# Patient Record
Sex: FEMALE | Race: WHITE | NOT HISPANIC OR LATINO | Employment: UNEMPLOYED | URBAN - METROPOLITAN AREA
[De-identification: names, ages, dates, MRNs, and addresses within clinical notes are randomized per-mention and may not be internally consistent; named-entity substitution may affect disease eponyms.]

---

## 2022-03-17 ENCOUNTER — OFFICE VISIT (OUTPATIENT)
Dept: INTERNAL MEDICINE | Facility: CLINIC | Age: 77
End: 2022-03-17
Payer: MEDICARE

## 2022-03-17 VITALS
WEIGHT: 155.88 LBS | TEMPERATURE: 97 F | SYSTOLIC BLOOD PRESSURE: 116 MMHG | HEIGHT: 64 IN | OXYGEN SATURATION: 96 % | HEART RATE: 100 BPM | DIASTOLIC BLOOD PRESSURE: 70 MMHG | BODY MASS INDEX: 26.61 KG/M2

## 2022-03-17 DIAGNOSIS — F41.9 ANXIETY: Primary | ICD-10-CM

## 2022-03-17 DIAGNOSIS — G47.00 INSOMNIA, UNSPECIFIED TYPE: ICD-10-CM

## 2022-03-17 DIAGNOSIS — Z13.6 SCREENING FOR CARDIOVASCULAR CONDITION: ICD-10-CM

## 2022-03-17 DIAGNOSIS — R60.9 EDEMA, UNSPECIFIED TYPE: ICD-10-CM

## 2022-03-17 DIAGNOSIS — R73.9 HYPERGLYCEMIA: ICD-10-CM

## 2022-03-17 DIAGNOSIS — G25.0 ESSENTIAL TREMOR: ICD-10-CM

## 2022-03-17 PROCEDURE — 3074F SYST BP LT 130 MM HG: CPT | Mod: CPTII,S$GLB,, | Performed by: FAMILY MEDICINE

## 2022-03-17 PROCEDURE — 3078F PR MOST RECENT DIASTOLIC BLOOD PRESSURE < 80 MM HG: ICD-10-PCS | Mod: CPTII,S$GLB,, | Performed by: FAMILY MEDICINE

## 2022-03-17 PROCEDURE — 1126F PR PAIN SEVERITY QUANTIFIED, NO PAIN PRESENT: ICD-10-PCS | Mod: CPTII,S$GLB,, | Performed by: FAMILY MEDICINE

## 2022-03-17 PROCEDURE — 99999 PR PBB SHADOW E&M-NEW PATIENT-LVL IV: ICD-10-PCS | Mod: PBBFAC,,, | Performed by: FAMILY MEDICINE

## 2022-03-17 PROCEDURE — 1101F PT FALLS ASSESS-DOCD LE1/YR: CPT | Mod: CPTII,S$GLB,, | Performed by: FAMILY MEDICINE

## 2022-03-17 PROCEDURE — 99999 PR PBB SHADOW E&M-NEW PATIENT-LVL IV: CPT | Mod: PBBFAC,,, | Performed by: FAMILY MEDICINE

## 2022-03-17 PROCEDURE — 1159F MED LIST DOCD IN RCRD: CPT | Mod: CPTII,S$GLB,, | Performed by: FAMILY MEDICINE

## 2022-03-17 PROCEDURE — 1126F AMNT PAIN NOTED NONE PRSNT: CPT | Mod: CPTII,S$GLB,, | Performed by: FAMILY MEDICINE

## 2022-03-17 PROCEDURE — 99204 OFFICE O/P NEW MOD 45 MIN: CPT | Mod: S$GLB,,, | Performed by: FAMILY MEDICINE

## 2022-03-17 PROCEDURE — 3008F BODY MASS INDEX DOCD: CPT | Mod: CPTII,S$GLB,, | Performed by: FAMILY MEDICINE

## 2022-03-17 PROCEDURE — 1159F PR MEDICATION LIST DOCUMENTED IN MEDICAL RECORD: ICD-10-PCS | Mod: CPTII,S$GLB,, | Performed by: FAMILY MEDICINE

## 2022-03-17 PROCEDURE — 3288F FALL RISK ASSESSMENT DOCD: CPT | Mod: CPTII,S$GLB,, | Performed by: FAMILY MEDICINE

## 2022-03-17 PROCEDURE — 3288F PR FALLS RISK ASSESSMENT DOCUMENTED: ICD-10-PCS | Mod: CPTII,S$GLB,, | Performed by: FAMILY MEDICINE

## 2022-03-17 PROCEDURE — 1101F PR PT FALLS ASSESS DOC 0-1 FALLS W/OUT INJ PAST YR: ICD-10-PCS | Mod: CPTII,S$GLB,, | Performed by: FAMILY MEDICINE

## 2022-03-17 PROCEDURE — 3078F DIAST BP <80 MM HG: CPT | Mod: CPTII,S$GLB,, | Performed by: FAMILY MEDICINE

## 2022-03-17 PROCEDURE — 3008F PR BODY MASS INDEX (BMI) DOCUMENTED: ICD-10-PCS | Mod: CPTII,S$GLB,, | Performed by: FAMILY MEDICINE

## 2022-03-17 PROCEDURE — 99204 PR OFFICE/OUTPT VISIT, NEW, LEVL IV, 45-59 MIN: ICD-10-PCS | Mod: S$GLB,,, | Performed by: FAMILY MEDICINE

## 2022-03-17 PROCEDURE — 3074F PR MOST RECENT SYSTOLIC BLOOD PRESSURE < 130 MM HG: ICD-10-PCS | Mod: CPTII,S$GLB,, | Performed by: FAMILY MEDICINE

## 2022-03-17 RX ORDER — CHOLECALCIFEROL (VITAMIN D3) 25 MCG
TABLET ORAL
COMMUNITY

## 2022-03-17 RX ORDER — BIMATOPROST 3 UG/ML
SOLUTION TOPICAL
Qty: 5 ML | Refills: 11 | Status: SHIPPED | OUTPATIENT
Start: 2022-03-17

## 2022-03-17 RX ORDER — ADHESIVE BANDAGE
30 BANDAGE TOPICAL DAILY
COMMUNITY

## 2022-03-17 RX ORDER — ACETAMINOPHEN, DEXTROMETHORPHAN HYDROBROMIDE, DOXYLAMINE SUCCINATE, PHENYLEPHRINE HYDROCHLORIDE 650; 20; 12.5; 1 MG/30ML; MG/30ML; MG/30ML; MG/30ML
1 SOLUTION ORAL
COMMUNITY

## 2022-03-20 NOTE — PROGRESS NOTES
Subjective:      Patient ID: Jannet Hayes is a 76 y.o. female.    Chief Complaint: Establish Care      Patient here to establish care.   She reports worsening essential tremor in hands, L>R.   Also reports history of clonazepam use - had stopped secondary to concerns about cognitive impairment - anxiety significantly worsened as well as the termor. Was originally taking the clonazepam for paresthesias in both legs. 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   Respiratory: Negative for cough.    Cardiovascular: Positive for leg swelling (ankles, mild).   Gastrointestinal: Negative for abdominal pain.   Neurological: Positive for tremors and numbness. Negative for dizziness, seizures, speech difficulty and weakness.   Psychiatric/Behavioral: The patient is nervous/anxious.      Past Medical History:   Diagnosis Date    Amnesia, global, transient 2019    Chronic constipation     Concussion 2020    History of IBS 2016    Skin cancer     Tremors of nervous system 2020    Vertigo 2020          Past Surgical History:   Procedure Laterality Date    ADENOIDECTOMY      LUMBAR LAMINECTOMY WITH SURGICAL REMOVAL OF LESION OF SPINAL CORD BY POSTERIOR APPROACH  1999     Family History   Problem Relation Age of Onset    Diabetes Mother     Stroke Mother     Lymphoma Mother     Heart disease Father      Social History     Socioeconomic History    Marital status:    Tobacco Use    Smoking status: Never Smoker    Smokeless tobacco: Never Used   Substance and Sexual Activity    Alcohol use: Yes     Comment: occas    Drug use: Never    Sexual activity: Not Currently     Review of patient's allergies indicates:   Allergen Reactions    Oxycodone-acetaminophen Anaphylaxis     HALLUCINATIONS    Codeine Other (See Comments)     hallucinations    Erythromycin Other  "(See Comments)     "odd thinking patterns, black dots in front of eyes"...."mycins"    Penicillins Hives     ITCHING, SWELLING.  Patient states "can take Keflex without any problems".       Objective:       /70   Pulse 100   Temp 97.3 °F (36.3 °C) (Temporal)   Ht 5' 4" (1.626 m)   Wt 70.7 kg (155 lb 13.8 oz)   SpO2 96%   BMI 26.75 kg/m²   Physical Exam  Vitals reviewed.   Constitutional:       General: She is not in acute distress.     Appearance: Normal appearance. She is well-developed. She is not ill-appearing or diaphoretic.   HENT:      Head: Normocephalic and atraumatic.      Right Ear: Hearing, tympanic membrane, ear canal and external ear normal.      Left Ear: Hearing, tympanic membrane, ear canal and external ear normal.      Nose: Nose normal.      Mouth/Throat:      Pharynx: Uvula midline. No oropharyngeal exudate.   Eyes:      Conjunctiva/sclera: Conjunctivae normal.      Pupils: Pupils are equal, round, and reactive to light.   Neck:      Thyroid: No thyromegaly.      Trachea: No tracheal deviation.   Cardiovascular:      Rate and Rhythm: Normal rate and regular rhythm.      Heart sounds: Normal heart sounds. No murmur heard.  Pulmonary:      Effort: Pulmonary effort is normal. No respiratory distress.      Breath sounds: Normal breath sounds.   Abdominal:      General: Bowel sounds are normal.      Palpations: Abdomen is soft.      Tenderness: There is no abdominal tenderness. There is no guarding.      Hernia: No hernia is present.   Musculoskeletal:         General: Normal range of motion.      Cervical back: Normal range of motion and neck supple.   Lymphadenopathy:      Cervical: No cervical adenopathy.   Skin:     General: Skin is warm and dry.      Capillary Refill: Capillary refill takes less than 2 seconds.   Neurological:      General: No focal deficit present.      Mental Status: She is alert and oriented to person, place, and time.   Psychiatric:         Mood and Affect: Mood " normal.         Behavior: Behavior normal.         Thought Content: Thought content normal.         Judgment: Judgment normal.       Assessment:     1. Anxiety    2. Essential tremor    3. Screening for cardiovascular condition    4. Hyperglycemia    5. Insomnia, unspecified type    6. Edema, unspecified type      Plan:   Anxiety  -     CBC Auto Differential; Future; Expected date: 03/17/2022  -     Comprehensive Metabolic Panel; Future; Expected date: 03/17/2022  -     Lipid Panel; Future; Expected date: 03/17/2022  -     Hemoglobin A1C; Future; Expected date: 03/17/2022  -     TSH; Future; Expected date: 03/17/2022    Essential tremor  -     CBC Auto Differential; Future; Expected date: 03/17/2022  -     Comprehensive Metabolic Panel; Future; Expected date: 03/17/2022  -     Lipid Panel; Future; Expected date: 03/17/2022  -     Hemoglobin A1C; Future; Expected date: 03/17/2022  -     TSH; Future; Expected date: 03/17/2022    Screening for cardiovascular condition  -     Lipid Panel; Future; Expected date: 03/17/2022    Hyperglycemia  -     Hemoglobin A1C; Future; Expected date: 03/17/2022    Insomnia, unspecified type    Edema, unspecified type  -     BNP; Future; Expected date: 03/17/2022    Other orders  -     bimatoprost (LATISSE) 0.03 % ophthalmic solution; Place one drop on applicator and apply evenly along the skin of the upper eyelid at base of eyelashes once daily at bedtime; repeat procedure for second eye (use a clean applicator).  Dispense: 5 mL; Refill: 11    will plan to come back for fasting labs then return for wellness visit.  Discussed possible primidone, buspirone use - she wants to look them up online before starting anything  Medication List with Changes/Refills   New Medications    BIMATOPROST (LATISSE) 0.03 % OPHTHALMIC SOLUTION    Place one drop on applicator and apply evenly along the skin of the upper eyelid at base of eyelashes once daily at bedtime; repeat procedure for second eye (use a  clean applicator).   Current Medications    ANTIARTHRITIC COMBINATION NO.2 900 MG TAB    Take 2 tablets by mouth once.    MAGNESIUM HYDROXIDE 400 MG/5 ML (MILK OF MAGNESIA) 400 MG/5 ML SUSP    Take 30 mLs by mouth once daily.    MULTIVIT-IRON-FA-CALCIUM-MINS (THERA-TABS M) 27 MG IRON-400 MCG TAB    Take 1 tablet by mouth once daily at 6am.    NON FORMULARY MEDICATION    1 tablet.    OMEGA-3 FATTY ACIDS-FISH -1,000 MG CAP    Take 1,000 mg by mouth once daily at 6am.    TURMERIC ROOT EXTRACT 1,053 MG TAB    Take 1 tablet by mouth.    VITAMIN D (VITAMIN D3) 1000 UNITS TAB    Take by mouth.

## 2022-03-21 ENCOUNTER — LAB VISIT (OUTPATIENT)
Dept: LAB | Facility: HOSPITAL | Age: 77
End: 2022-03-21
Attending: FAMILY MEDICINE
Payer: MEDICARE

## 2022-03-21 DIAGNOSIS — R60.9 EDEMA, UNSPECIFIED TYPE: ICD-10-CM

## 2022-03-21 DIAGNOSIS — Z13.6 SCREENING FOR CARDIOVASCULAR CONDITION: ICD-10-CM

## 2022-03-21 DIAGNOSIS — F41.9 ANXIETY: ICD-10-CM

## 2022-03-21 DIAGNOSIS — G25.0 ESSENTIAL TREMOR: ICD-10-CM

## 2022-03-21 DIAGNOSIS — R73.9 HYPERGLYCEMIA: ICD-10-CM

## 2022-03-21 LAB
ALBUMIN SERPL BCP-MCNC: 3.9 G/DL (ref 3.5–5.2)
ALP SERPL-CCNC: 34 U/L (ref 55–135)
ALT SERPL W/O P-5'-P-CCNC: 21 U/L (ref 10–44)
ANION GAP SERPL CALC-SCNC: 14 MMOL/L (ref 8–16)
AST SERPL-CCNC: 29 U/L (ref 10–40)
BASOPHILS # BLD AUTO: 0.08 K/UL (ref 0–0.2)
BASOPHILS NFR BLD: 1.1 % (ref 0–1.9)
BILIRUB SERPL-MCNC: 0.4 MG/DL (ref 0.1–1)
BNP SERPL-MCNC: <10 PG/ML (ref 0–99)
BUN SERPL-MCNC: 16 MG/DL (ref 8–23)
CALCIUM SERPL-MCNC: 10.1 MG/DL (ref 8.7–10.5)
CHLORIDE SERPL-SCNC: 106 MMOL/L (ref 95–110)
CHOLEST SERPL-MCNC: 218 MG/DL (ref 120–199)
CHOLEST/HDLC SERPL: 2.4 {RATIO} (ref 2–5)
CO2 SERPL-SCNC: 24 MMOL/L (ref 23–29)
CREAT SERPL-MCNC: 0.9 MG/DL (ref 0.5–1.4)
DIFFERENTIAL METHOD: ABNORMAL
EOSINOPHIL # BLD AUTO: 0.1 K/UL (ref 0–0.5)
EOSINOPHIL NFR BLD: 1.6 % (ref 0–8)
ERYTHROCYTE [DISTWIDTH] IN BLOOD BY AUTOMATED COUNT: 13.7 % (ref 11.5–14.5)
EST. GFR  (AFRICAN AMERICAN): >60 ML/MIN/1.73 M^2
EST. GFR  (NON AFRICAN AMERICAN): >60 ML/MIN/1.73 M^2
ESTIMATED AVG GLUCOSE: 97 MG/DL (ref 68–131)
GLUCOSE SERPL-MCNC: 87 MG/DL (ref 70–110)
HBA1C MFR BLD: 5 % (ref 4–5.6)
HCT VFR BLD AUTO: 44.3 % (ref 37–48.5)
HDLC SERPL-MCNC: 91 MG/DL (ref 40–75)
HDLC SERPL: 41.7 % (ref 20–50)
HGB BLD-MCNC: 14.1 G/DL (ref 12–16)
IMM GRANULOCYTES # BLD AUTO: 0.03 K/UL (ref 0–0.04)
IMM GRANULOCYTES NFR BLD AUTO: 0.4 % (ref 0–0.5)
LDLC SERPL CALC-MCNC: 100.6 MG/DL (ref 63–159)
LYMPHOCYTES # BLD AUTO: 2.5 K/UL (ref 1–4.8)
LYMPHOCYTES NFR BLD: 34 % (ref 18–48)
MCH RBC QN AUTO: 30.6 PG (ref 27–31)
MCHC RBC AUTO-ENTMCNC: 31.8 G/DL (ref 32–36)
MCV RBC AUTO: 96 FL (ref 82–98)
MONOCYTES # BLD AUTO: 0.7 K/UL (ref 0.3–1)
MONOCYTES NFR BLD: 9.3 % (ref 4–15)
NEUTROPHILS # BLD AUTO: 3.9 K/UL (ref 1.8–7.7)
NEUTROPHILS NFR BLD: 53.6 % (ref 38–73)
NONHDLC SERPL-MCNC: 127 MG/DL
NRBC BLD-RTO: 0 /100 WBC
PLATELET # BLD AUTO: 350 K/UL (ref 150–450)
PMV BLD AUTO: 10.8 FL (ref 9.2–12.9)
POTASSIUM SERPL-SCNC: 4.1 MMOL/L (ref 3.5–5.1)
PROT SERPL-MCNC: 7.4 G/DL (ref 6–8.4)
RBC # BLD AUTO: 4.61 M/UL (ref 4–5.4)
SODIUM SERPL-SCNC: 144 MMOL/L (ref 136–145)
TRIGL SERPL-MCNC: 132 MG/DL (ref 30–150)
TSH SERPL DL<=0.005 MIU/L-ACNC: 2.8 UIU/ML (ref 0.4–4)
WBC # BLD AUTO: 7.33 K/UL (ref 3.9–12.7)

## 2022-03-21 PROCEDURE — 83036 HEMOGLOBIN GLYCOSYLATED A1C: CPT | Performed by: FAMILY MEDICINE

## 2022-03-21 PROCEDURE — 85025 COMPLETE CBC W/AUTO DIFF WBC: CPT | Performed by: FAMILY MEDICINE

## 2022-03-21 PROCEDURE — 80053 COMPREHEN METABOLIC PANEL: CPT | Performed by: FAMILY MEDICINE

## 2022-03-21 PROCEDURE — 84443 ASSAY THYROID STIM HORMONE: CPT | Performed by: FAMILY MEDICINE

## 2022-03-21 PROCEDURE — 80061 LIPID PANEL: CPT | Performed by: FAMILY MEDICINE

## 2022-03-21 PROCEDURE — 36415 COLL VENOUS BLD VENIPUNCTURE: CPT | Mod: PO | Performed by: FAMILY MEDICINE

## 2022-03-21 PROCEDURE — 83880 ASSAY OF NATRIURETIC PEPTIDE: CPT | Performed by: FAMILY MEDICINE

## 2022-03-22 ENCOUNTER — TELEPHONE (OUTPATIENT)
Dept: INTERNAL MEDICINE | Facility: CLINIC | Age: 77
End: 2022-03-22
Payer: MEDICARE

## 2022-03-24 ENCOUNTER — TELEPHONE (OUTPATIENT)
Dept: INTERNAL MEDICINE | Facility: CLINIC | Age: 77
End: 2022-03-24
Payer: MEDICARE

## 2022-03-24 ENCOUNTER — OFFICE VISIT (OUTPATIENT)
Dept: URGENT CARE | Facility: CLINIC | Age: 77
End: 2022-03-24
Payer: MEDICARE

## 2022-03-24 DIAGNOSIS — M79.604 PAIN OF RIGHT LOWER EXTREMITY: Primary | ICD-10-CM

## 2022-03-24 PROCEDURE — 3044F PR MOST RECENT HEMOGLOBIN A1C LEVEL <7.0%: ICD-10-PCS | Mod: CPTII,S$GLB,, | Performed by: EMERGENCY MEDICINE

## 2022-03-24 PROCEDURE — 99499 UNLISTED E&M SERVICE: CPT | Mod: S$GLB,,, | Performed by: EMERGENCY MEDICINE

## 2022-03-24 PROCEDURE — 99499 NO LOS: ICD-10-PCS | Mod: S$GLB,,, | Performed by: EMERGENCY MEDICINE

## 2022-03-24 PROCEDURE — 3044F HG A1C LEVEL LT 7.0%: CPT | Mod: CPTII,S$GLB,, | Performed by: EMERGENCY MEDICINE

## 2022-03-24 NOTE — TELEPHONE ENCOUNTER
----- Message from Katharine Hope sent at 3/24/2022  9:58 AM CDT -----  .Type:  Same Day Appointment Request    Caller is requesting a same day appointment.  Caller declined first available appointment listed below.    Name of Caller:TUSHAR MICHAEL [[id number]]  When is the first available appointment? 03/28/2022  Symptoms: possible blood clot  Best Call Back Number: [phone]   Additional Information:

## 2022-03-24 NOTE — PROGRESS NOTES
Patient concerned regarding blood clot and right leg.  Admits took a trip to [place] and bilateral lower extremities were swollen.  Requesting checking for a blood clot and right lower extremity.  Discussed with patient the need for further evaluation at emergency room with ultrasound.  Patient decided to go to the ER instead of being checked out in urgent care.  Patient deferred physical exam in urgent care.

## 2022-03-24 NOTE — TELEPHONE ENCOUNTER
Late entry  called patient at 1020 am tried to get same day appt at [facility] location unsuccessfully she is going to walkin clinic or ed

## 2022-03-27 ENCOUNTER — TELEPHONE (OUTPATIENT)
Dept: URGENT CARE | Facility: CLINIC | Age: 77
End: 2022-03-27
Payer: MEDICARE

## 2022-03-28 ENCOUNTER — OFFICE VISIT (OUTPATIENT)
Dept: INTERNAL MEDICINE | Facility: CLINIC | Age: 77
End: 2022-03-28
Payer: MEDICARE

## 2022-03-28 VITALS
WEIGHT: 155.44 LBS | OXYGEN SATURATION: 98 % | DIASTOLIC BLOOD PRESSURE: 76 MMHG | SYSTOLIC BLOOD PRESSURE: 120 MMHG | HEART RATE: 99 BPM | TEMPERATURE: 98 F | HEIGHT: 64 IN | BODY MASS INDEX: 26.54 KG/M2

## 2022-03-28 DIAGNOSIS — Z00.00 WELLNESS EXAMINATION: Primary | ICD-10-CM

## 2022-03-28 DIAGNOSIS — R60.9 EDEMA, UNSPECIFIED TYPE: ICD-10-CM

## 2022-03-28 DIAGNOSIS — Z12.31 ENCOUNTER FOR SCREENING MAMMOGRAM FOR MALIGNANT NEOPLASM OF BREAST: ICD-10-CM

## 2022-03-28 DIAGNOSIS — Z12.11 COLON CANCER SCREENING: ICD-10-CM

## 2022-03-28 DIAGNOSIS — Z78.0 POSTMENOPAUSAL: ICD-10-CM

## 2022-03-28 DIAGNOSIS — R05.3 CHRONIC COUGH: ICD-10-CM

## 2022-03-28 DIAGNOSIS — M81.0 OSTEOPOROSIS, UNSPECIFIED OSTEOPOROSIS TYPE, UNSPECIFIED PATHOLOGICAL FRACTURE PRESENCE: ICD-10-CM

## 2022-03-28 DIAGNOSIS — R53.83 FATIGUE, UNSPECIFIED TYPE: ICD-10-CM

## 2022-03-28 PROCEDURE — 3008F BODY MASS INDEX DOCD: CPT | Mod: CPTII,S$GLB,, | Performed by: FAMILY MEDICINE

## 2022-03-28 PROCEDURE — 1159F MED LIST DOCD IN RCRD: CPT | Mod: CPTII,S$GLB,, | Performed by: FAMILY MEDICINE

## 2022-03-28 PROCEDURE — 3044F HG A1C LEVEL LT 7.0%: CPT | Mod: CPTII,S$GLB,, | Performed by: FAMILY MEDICINE

## 2022-03-28 PROCEDURE — 3008F PR BODY MASS INDEX (BMI) DOCUMENTED: ICD-10-PCS | Mod: CPTII,S$GLB,, | Performed by: FAMILY MEDICINE

## 2022-03-28 PROCEDURE — 99214 PR OFFICE/OUTPT VISIT, EST, LEVL IV, 30-39 MIN: ICD-10-PCS | Mod: 25,S$GLB,, | Performed by: FAMILY MEDICINE

## 2022-03-28 PROCEDURE — 3288F PR FALLS RISK ASSESSMENT DOCUMENTED: ICD-10-PCS | Mod: CPTII,S$GLB,, | Performed by: FAMILY MEDICINE

## 2022-03-28 PROCEDURE — 3288F FALL RISK ASSESSMENT DOCD: CPT | Mod: CPTII,S$GLB,, | Performed by: FAMILY MEDICINE

## 2022-03-28 PROCEDURE — 1159F PR MEDICATION LIST DOCUMENTED IN MEDICAL RECORD: ICD-10-PCS | Mod: CPTII,S$GLB,, | Performed by: FAMILY MEDICINE

## 2022-03-28 PROCEDURE — 1126F PR PAIN SEVERITY QUANTIFIED, NO PAIN PRESENT: ICD-10-PCS | Mod: CPTII,S$GLB,, | Performed by: FAMILY MEDICINE

## 2022-03-28 PROCEDURE — 1101F PT FALLS ASSESS-DOCD LE1/YR: CPT | Mod: CPTII,S$GLB,, | Performed by: FAMILY MEDICINE

## 2022-03-28 PROCEDURE — 1101F PR PT FALLS ASSESS DOC 0-1 FALLS W/OUT INJ PAST YR: ICD-10-PCS | Mod: CPTII,S$GLB,, | Performed by: FAMILY MEDICINE

## 2022-03-28 PROCEDURE — 3074F SYST BP LT 130 MM HG: CPT | Mod: CPTII,S$GLB,, | Performed by: FAMILY MEDICINE

## 2022-03-28 PROCEDURE — 99999 PR PBB SHADOW E&M-EST. PATIENT-LVL V: ICD-10-PCS | Mod: PBBFAC,,, | Performed by: FAMILY MEDICINE

## 2022-03-28 PROCEDURE — 3074F PR MOST RECENT SYSTOLIC BLOOD PRESSURE < 130 MM HG: ICD-10-PCS | Mod: CPTII,S$GLB,, | Performed by: FAMILY MEDICINE

## 2022-03-28 PROCEDURE — 3078F PR MOST RECENT DIASTOLIC BLOOD PRESSURE < 80 MM HG: ICD-10-PCS | Mod: CPTII,S$GLB,, | Performed by: FAMILY MEDICINE

## 2022-03-28 PROCEDURE — 99214 OFFICE O/P EST MOD 30 MIN: CPT | Mod: 25,S$GLB,, | Performed by: FAMILY MEDICINE

## 2022-03-28 PROCEDURE — 1126F AMNT PAIN NOTED NONE PRSNT: CPT | Mod: CPTII,S$GLB,, | Performed by: FAMILY MEDICINE

## 2022-03-28 PROCEDURE — 99999 PR PBB SHADOW E&M-EST. PATIENT-LVL V: CPT | Mod: PBBFAC,,, | Performed by: FAMILY MEDICINE

## 2022-03-28 PROCEDURE — 3078F DIAST BP <80 MM HG: CPT | Mod: CPTII,S$GLB,, | Performed by: FAMILY MEDICINE

## 2022-03-28 PROCEDURE — 3044F PR MOST RECENT HEMOGLOBIN A1C LEVEL <7.0%: ICD-10-PCS | Mod: CPTII,S$GLB,, | Performed by: FAMILY MEDICINE

## 2022-03-28 RX ORDER — ACETAMINOPHEN 500 MG
500 TABLET ORAL
COMMUNITY

## 2022-03-28 RX ORDER — GABAPENTIN 300 MG/1
300 CAPSULE ORAL NIGHTLY
Qty: 30 CAPSULE | Refills: 6 | Status: SHIPPED | OUTPATIENT
Start: 2022-03-28 | End: 2022-06-13

## 2022-03-30 NOTE — PROGRESS NOTES
Subjective:      Patient ID: Jannet Hayes is a 76 y.o. female.    Chief Complaint: Annual Exam      The patient is here today for routine follow up and annual wellness exam. Labs drawn earlier discussed today with the patient.  Has continued swelling in right lower leg - has had since drive to [place] earlier this month, concerned about DVT.   Reports chronic fatigue as well as some difficulty sleeping. Would like to try gabapentin for tremor and RLS.   Osteoporosis with dexa in 2019 - T score - 2.7. she did not start bisphosphonate for this.   Chronic dry cough which is bothersome -  Has had this for several years, random, cannot pinpoint any triggers.         Review of Systems   Constitutional: Positive for fatigue. Negative for activity change and appetite change.   Respiratory: Positive for cough. Negative for shortness of breath and wheezing.    Cardiovascular: Negative for leg swelling.   Gastrointestinal: Negative for abdominal pain.   Neurological: Positive for tremors.   Psychiatric/Behavioral: Positive for sleep disturbance.     Past Medical History:   Diagnosis Date    Amnesia, global, transient 2019    Chronic constipation     Concussion 2020    History of IBS 2016    Skin cancer     Tremors of nervous system 2020    Vertigo 2020          Past Surgical History:   Procedure Laterality Date    ADENOIDECTOMY      LUMBAR LAMINECTOMY WITH SURGICAL REMOVAL OF LESION OF SPINAL CORD BY POSTERIOR APPROACH  1999     Family History   Problem Relation Age of Onset    Diabetes Mother     Stroke Mother     Lymphoma Mother     Heart disease Father      Social History     Socioeconomic History    Marital status:    Tobacco Use    Smoking status: Never Smoker    Smokeless tobacco: Never Used   Substance and Sexual Activity    Alcohol use: Yes     Comment: occas    Drug use: Never    Sexual activity: Not Currently     Review of patient's allergies indicates:   Allergen Reactions     "Oxycodone-acetaminophen Anaphylaxis     HALLUCINATIONS    Codeine Other (See Comments)     hallucinations    Erythromycin Other (See Comments)     "odd thinking patterns, black dots in front of eyes"...."mycins"    Penicillins Hives     ITCHING, SWELLING.  Patient states "can take Keflex without any problems".       Objective:       /76 (BP Location: Left arm, Patient Position: Sitting, BP Method: Large (Manual))   Pulse 99   Temp 98.2 °F (36.8 °C) (Tympanic)   Ht 5' 4" (1.626 m)   Wt 70.5 kg (155 lb 6.8 oz)   SpO2 98%   BMI 26.68 kg/m²   Physical Exam  Vitals reviewed.   Constitutional:       General: She is not in acute distress.     Appearance: Normal appearance. She is well-developed. She is not ill-appearing or diaphoretic.   HENT:      Head: Normocephalic and atraumatic.      Right Ear: Hearing, tympanic membrane, ear canal and external ear normal.      Left Ear: Hearing, tympanic membrane, ear canal and external ear normal.      Nose: Nose normal.      Mouth/Throat:      Pharynx: Uvula midline. No oropharyngeal exudate.   Eyes:      Conjunctiva/sclera: Conjunctivae normal.      Pupils: Pupils are equal, round, and reactive to light.   Neck:      Thyroid: No thyromegaly.      Trachea: No tracheal deviation.   Cardiovascular:      Rate and Rhythm: Normal rate and regular rhythm.      Heart sounds: Normal heart sounds. No murmur heard.  Pulmonary:      Effort: Pulmonary effort is normal. No respiratory distress.      Breath sounds: Normal breath sounds.   Abdominal:      General: Bowel sounds are normal.      Palpations: Abdomen is soft.      Tenderness: There is no abdominal tenderness. There is no guarding.      Hernia: No hernia is present.   Musculoskeletal:         General: Normal range of motion.      Cervical back: Normal range of motion and neck supple.   Lymphadenopathy:      Cervical: No cervical adenopathy.   Skin:     General: Skin is warm and dry.      Capillary Refill: Capillary " refill takes less than 2 seconds.   Neurological:      General: No focal deficit present.      Mental Status: She is alert and oriented to person, place, and time.      Comments: Tremor     Psychiatric:         Mood and Affect: Mood normal.         Behavior: Behavior normal.         Thought Content: Thought content normal.         Judgment: Judgment normal.       Assessment:     1. Wellness examination    2. Edema, unspecified type    3. Osteoporosis, unspecified osteoporosis type, unspecified pathological fracture presence    4. Encounter for screening mammogram for malignant neoplasm of breast    5. Postmenopausal    6. Colon cancer screening    7. Chronic cough    8. Fatigue, unspecified type      Plan:   Wellness examination    Edema, unspecified type  -     US Lower Extremity Veins Right; Future; Expected date: 03/28/2022    Osteoporosis, unspecified osteoporosis type, unspecified pathological fracture presence  Comments:  dexa 2019 T -2.7    Encounter for screening mammogram for malignant neoplasm of breast  -     Mammo Digital Screening Bilat w/ Rei; Future; Expected date: 03/28/2022    Postmenopausal  -     DXA Bone Density Spine And Hip; Future; Expected date: 03/28/2022    Colon cancer screening  -     Fecal Immunochemical Test (iFOBT); Future; Expected date: 03/28/2022    Chronic cough    Fatigue, unspecified type    Other orders  -     gabapentin (NEURONTIN) 300 MG capsule; Take 1 capsule (300 mg total) by mouth every evening.  Dispense: 30 capsule; Refill: 6    declined vaccines    Medication List with Changes/Refills   New Medications    GABAPENTIN (NEURONTIN) 300 MG CAPSULE    Take 1 capsule (300 mg total) by mouth every evening.   Current Medications    ACETAMINOPHEN (TYLENOL) 500 MG TABLET    Take 500 mg by mouth as needed.    ANTIARTHRITIC COMBINATION NO.2 900 MG TAB    Take 2 tablets by mouth once.    BIMATOPROST (LATISSE) 0.03 % OPHTHALMIC SOLUTION    Place one drop on applicator and apply  evenly along the skin of the upper eyelid at base of eyelashes once daily at bedtime; repeat procedure for second eye (use a clean applicator).    MAGNESIUM HYDROXIDE 400 MG/5 ML (MILK OF MAGNESIA) 400 MG/5 ML SUSP    Take 30 mLs by mouth once daily.    MULTIVIT-IRON-FA-CALCIUM-MINS (THERA-TABS M) 27 MG IRON-400 MCG TAB    Take 1 tablet by mouth once daily at 6am.    NON FORMULARY MEDICATION    1 tablet.    OMEGA-3 FATTY ACIDS-FISH -1,000 MG CAP    Take 1,000 mg by mouth once daily at 6am.    TURMERIC ROOT EXTRACT 1,053 MG TAB    Take 1 tablet by mouth.    VITAMIN D (VITAMIN D3) 1000 UNITS TAB    Take by mouth.

## 2022-04-01 ENCOUNTER — HOSPITAL ENCOUNTER (OUTPATIENT)
Dept: RADIOLOGY | Facility: HOSPITAL | Age: 77
Discharge: HOME | End: 2022-04-01
Attending: FAMILY MEDICINE
Payer: MEDICARE

## 2022-04-01 DIAGNOSIS — R60.9 EDEMA, UNSPECIFIED TYPE: ICD-10-CM

## 2022-04-01 PROCEDURE — 93971 EXTREMITY STUDY: CPT | Mod: TC,RT

## 2022-04-01 PROCEDURE — 93971 US LOWER EXTREMITY VEINS RIGHT: ICD-10-PCS | Mod: 26,RT,, | Performed by: RADIOLOGY

## 2022-04-01 PROCEDURE — 93971 EXTREMITY STUDY: CPT | Mod: 26,RT,, | Performed by: RADIOLOGY

## 2022-04-11 ENCOUNTER — TELEPHONE (OUTPATIENT)
Dept: INTERNAL MEDICINE | Facility: CLINIC | Age: 77
End: 2022-04-11
Payer: MEDICARE

## 2022-04-11 NOTE — TELEPHONE ENCOUNTER
----- Message from González Aburto MD sent at 4/9/2022 11:40 AM CDT -----  Please notify stool test negative, repeat 1 year

## 2022-06-03 ENCOUNTER — OFFICE VISIT (OUTPATIENT)
Dept: GASTROENTEROLOGY | Facility: CLINIC | Age: 77
End: 2022-06-03
Payer: MEDICARE

## 2022-06-03 ENCOUNTER — HOSPITAL ENCOUNTER (OUTPATIENT)
Dept: RADIOLOGY | Facility: HOSPITAL | Age: 77
Discharge: HOME | End: 2022-06-03
Attending: PHYSICIAN ASSISTANT
Payer: MEDICARE

## 2022-06-03 VITALS
SYSTOLIC BLOOD PRESSURE: 100 MMHG | OXYGEN SATURATION: 98 % | WEIGHT: 147.25 LBS | DIASTOLIC BLOOD PRESSURE: 78 MMHG | BODY MASS INDEX: 25.14 KG/M2 | HEART RATE: 80 BPM | HEIGHT: 64 IN

## 2022-06-03 DIAGNOSIS — R14.0 ABDOMINAL BLOATING: Primary | ICD-10-CM

## 2022-06-03 DIAGNOSIS — R68.81 EARLY SATIETY: ICD-10-CM

## 2022-06-03 DIAGNOSIS — R53.83 FATIGUE, UNSPECIFIED TYPE: ICD-10-CM

## 2022-06-03 DIAGNOSIS — K59.00 CONSTIPATION, UNSPECIFIED CONSTIPATION TYPE: ICD-10-CM

## 2022-06-03 DIAGNOSIS — R14.0 ABDOMINAL BLOATING: ICD-10-CM

## 2022-06-03 PROCEDURE — 99999 PR PBB SHADOW E&M-EST. PATIENT-LVL IV: CPT | Mod: PBBFAC,,, | Performed by: PHYSICIAN ASSISTANT

## 2022-06-03 PROCEDURE — 3008F PR BODY MASS INDEX (BMI) DOCUMENTED: ICD-10-PCS | Mod: CPTII,S$GLB,, | Performed by: PHYSICIAN ASSISTANT

## 2022-06-03 PROCEDURE — 99203 OFFICE O/P NEW LOW 30 MIN: CPT | Mod: S$GLB,,, | Performed by: PHYSICIAN ASSISTANT

## 2022-06-03 PROCEDURE — 1101F PR PT FALLS ASSESS DOC 0-1 FALLS W/OUT INJ PAST YR: ICD-10-PCS | Mod: CPTII,S$GLB,, | Performed by: PHYSICIAN ASSISTANT

## 2022-06-03 PROCEDURE — 3074F SYST BP LT 130 MM HG: CPT | Mod: CPTII,S$GLB,, | Performed by: PHYSICIAN ASSISTANT

## 2022-06-03 PROCEDURE — 3044F PR MOST RECENT HEMOGLOBIN A1C LEVEL <7.0%: ICD-10-PCS | Mod: CPTII,S$GLB,, | Performed by: PHYSICIAN ASSISTANT

## 2022-06-03 PROCEDURE — 1159F MED LIST DOCD IN RCRD: CPT | Mod: CPTII,S$GLB,, | Performed by: PHYSICIAN ASSISTANT

## 2022-06-03 PROCEDURE — 3044F HG A1C LEVEL LT 7.0%: CPT | Mod: CPTII,S$GLB,, | Performed by: PHYSICIAN ASSISTANT

## 2022-06-03 PROCEDURE — 1159F PR MEDICATION LIST DOCUMENTED IN MEDICAL RECORD: ICD-10-PCS | Mod: CPTII,S$GLB,, | Performed by: PHYSICIAN ASSISTANT

## 2022-06-03 PROCEDURE — 3078F PR MOST RECENT DIASTOLIC BLOOD PRESSURE < 80 MM HG: ICD-10-PCS | Mod: CPTII,S$GLB,, | Performed by: PHYSICIAN ASSISTANT

## 2022-06-03 PROCEDURE — 99203 PR OFFICE/OUTPT VISIT, NEW, LEVL III, 30-44 MIN: ICD-10-PCS | Mod: S$GLB,,, | Performed by: PHYSICIAN ASSISTANT

## 2022-06-03 PROCEDURE — 1126F PR PAIN SEVERITY QUANTIFIED, NO PAIN PRESENT: ICD-10-PCS | Mod: CPTII,S$GLB,, | Performed by: PHYSICIAN ASSISTANT

## 2022-06-03 PROCEDURE — 3078F DIAST BP <80 MM HG: CPT | Mod: CPTII,S$GLB,, | Performed by: PHYSICIAN ASSISTANT

## 2022-06-03 PROCEDURE — 3288F PR FALLS RISK ASSESSMENT DOCUMENTED: ICD-10-PCS | Mod: CPTII,S$GLB,, | Performed by: PHYSICIAN ASSISTANT

## 2022-06-03 PROCEDURE — 3074F PR MOST RECENT SYSTOLIC BLOOD PRESSURE < 130 MM HG: ICD-10-PCS | Mod: CPTII,S$GLB,, | Performed by: PHYSICIAN ASSISTANT

## 2022-06-03 PROCEDURE — 99999 PR PBB SHADOW E&M-EST. PATIENT-LVL IV: ICD-10-PCS | Mod: PBBFAC,,, | Performed by: PHYSICIAN ASSISTANT

## 2022-06-03 PROCEDURE — 1126F AMNT PAIN NOTED NONE PRSNT: CPT | Mod: CPTII,S$GLB,, | Performed by: PHYSICIAN ASSISTANT

## 2022-06-03 PROCEDURE — 1101F PT FALLS ASSESS-DOCD LE1/YR: CPT | Mod: CPTII,S$GLB,, | Performed by: PHYSICIAN ASSISTANT

## 2022-06-03 PROCEDURE — 3008F BODY MASS INDEX DOCD: CPT | Mod: CPTII,S$GLB,, | Performed by: PHYSICIAN ASSISTANT

## 2022-06-03 PROCEDURE — 74019 XR ABDOMEN FLAT AND ERECT: ICD-10-PCS | Mod: 26,,, | Performed by: RADIOLOGY

## 2022-06-03 PROCEDURE — 74019 RADEX ABDOMEN 2 VIEWS: CPT | Mod: TC

## 2022-06-03 PROCEDURE — 3288F FALL RISK ASSESSMENT DOCD: CPT | Mod: CPTII,S$GLB,, | Performed by: PHYSICIAN ASSISTANT

## 2022-06-03 PROCEDURE — 74019 RADEX ABDOMEN 2 VIEWS: CPT | Mod: 26,,, | Performed by: RADIOLOGY

## 2022-06-03 RX ORDER — ESTRADIOL 0.07 MG/D
FILM, EXTENDED RELEASE TRANSDERMAL
COMMUNITY
Start: 2022-05-31

## 2022-06-03 RX ORDER — PROGESTERONE 100 MG/1
200 CAPSULE ORAL NIGHTLY
COMMUNITY
Start: 2022-05-27

## 2022-06-03 NOTE — PROGRESS NOTES
Clinic Consult:  [facility] Consultation Note    Reason for Consult:  The primary encounter diagnosis was Abdominal bloating. Diagnoses of Constipation, unspecified constipation type, Fatigue, unspecified type, and Early satiety were also pertinent to this visit.    PCP: González Aburto   No address on file    CC: Abdominal Pain      HPI:  This is a 76 y.o. female here for evaluation of the above. Reports many gastro problems over the past 10-12 years. In last few weeks, worsening. Getting very full even with small amounts of food. A lot of gas and bloating. Feels a burning/aching pain around the middle of her abdomen. Stomach can get hard and distended with small amount of food. Fatigued. COVID about 1 month ago. Seems more tired now than when she has COVID. Feels short of breath sometimes. Would like to rule out stomach cancer. Appetite seems maybe a little decreased which is unusual. Bowels have always been a problem. Takes Milk of Magnesia daily. No blood in the stool. 10 yrs ago went to numerous studies and tests done which showed redundant colon and slow emptying of the stomach and pelvic floor dysfunction. Nauseated occasionally. EGD and colonoscopy completed 10-12 yrs ago. Believes she had a colonoscopy about 3 years ago in [place]. Has done cologuard and FIT since then with negative results.    Review of Systems   Constitutional: Positive for fatigue. Negative for activity change, appetite change, chills, diaphoresis, fever and unexpected weight change.   HENT: Negative for trouble swallowing.    Respiratory: Positive for shortness of breath (occasionally with exertion). Negative for cough.    Cardiovascular: Negative for chest pain.   Gastrointestinal: Positive for abdominal pain, constipation and nausea. Negative for abdominal distention, blood in stool, diarrhea and vomiting.   Musculoskeletal: Negative for back pain.   Skin: Negative for color change and pallor.  "  Neurological: Negative for dizziness, weakness and light-headedness.   Psychiatric/Behavioral: Negative for dysphoric mood. The patient is not nervous/anxious.         Medical History:   Past Medical History:   Diagnosis Date    Amnesia, global, transient 2019    Chronic constipation     Concussion 2020    History of IBS 2016    Skin cancer     Tremors of nervous system 2020    Vertigo 2020       Surgical History:   Past Surgical History:   Procedure Laterality Date    ADENOIDECTOMY      LUMBAR LAMINECTOMY WITH SURGICAL REMOVAL OF LESION OF SPINAL CORD BY POSTERIOR APPROACH  1999       Family History:    Family History   Problem Relation Age of Onset    Diabetes Mother     Stroke Mother     Lymphoma Mother     Heart disease Father        Social History:   Social History     Socioeconomic History    Marital status:    Tobacco Use    Smoking status: Never Smoker    Smokeless tobacco: Never Used   Substance and Sexual Activity    Alcohol use: Yes     Comment: occas    Drug use: Never    Sexual activity: Not Currently       Allergies:   Review of patient's allergies indicates:   Allergen Reactions    Oxycodone-acetaminophen Anaphylaxis     HALLUCINATIONS    Codeine Other (See Comments)     hallucinations    Erythromycin Other (See Comments)     "odd thinking patterns, black dots in front of eyes"...."mycins"    Penicillins Hives     ITCHING, SWELLING.  Patient states "can take Keflex without any problems".       Home Medications:   Current Outpatient Medications on File Prior to Visit   Medication Sig Dispense Refill    acetaminophen (TYLENOL) 500 MG tablet Take 500 mg by mouth as needed.      bimatoprost (LATISSE) 0.03 % ophthalmic solution Place one drop on applicator and apply evenly along the skin of the upper eyelid at base of eyelashes once daily at bedtime; repeat procedure for second eye (use a clean applicator). 5 mL 11    estradioL (VIVELLE-DOT) 0.075 mg/24 hr Place onto the " "skin.      magnesium hydroxide 400 mg/5 ml (MILK OF MAGNESIA) 400 mg/5 mL Susp Take 30 mLs by mouth once daily.      multivit-iron-FA-calcium-mins (THERA-TABS M) 27 mg iron-400 mcg Tab Take 1 tablet by mouth once daily at 6am.      omega-3 fatty acids-fish oil 340-1,000 mg Cap Take 1,000 mg by mouth once daily at 6am.      progesterone (PROMETRIUM) 100 MG capsule Take 100 mg by mouth nightly.      turmeric root extract 1,053 mg Tab Take 1 tablet by mouth.      vitamin D (VITAMIN D3) 1000 units Tab Take by mouth.      antiarthritic combination no.2 900 mg Tab Take 2 tablets by mouth once.      gabapentin (NEURONTIN) 300 MG capsule Take 1 capsule (300 mg total) by mouth every evening. (Patient not taking: Reported on 6/3/2022) 30 capsule 6    NON FORMULARY MEDICATION 1 tablet.       No current facility-administered medications on file prior to visit.       Physical Exam:  /78   Pulse 80   Ht 5' 4" (1.626 m)   Wt 66.8 kg (147 lb 4.3 oz)   SpO2 98%   BMI 25.28 kg/m²   Body mass index is 25.28 kg/m².  Physical Exam  Constitutional:       General: She is not in acute distress.     Appearance: Normal appearance. She is normal weight. She is not ill-appearing, toxic-appearing or diaphoretic.   HENT:      Head: Normocephalic and atraumatic.   Eyes:      General: No scleral icterus.     Extraocular Movements: Extraocular movements intact.   Cardiovascular:      Rate and Rhythm: Normal rate and regular rhythm.   Pulmonary:      Effort: Pulmonary effort is normal. No respiratory distress.      Breath sounds: Normal breath sounds.   Abdominal:      General: Bowel sounds are normal. There is no distension.      Palpations: Abdomen is soft. There is no mass.      Tenderness: There is no abdominal tenderness. There is no guarding.   Musculoskeletal:         General: Normal range of motion.      Cervical back: Normal range of motion.   Skin:     General: Skin is warm and dry.      Coloration: Skin is not " jaundiced or pale.   Neurological:      General: No focal deficit present.      Mental Status: She is alert and oriented to person, place, and time.           Labs: Pertinent labs reviewed.  Endoscopy: 2012  CRC Screening: colonoscopy about 3 years ago, recent - FIT  Anticoagulation: none    Assessment:  1. Abdominal bloating    2. Constipation, unspecified constipation type    3. Fatigue, unspecified type    4. Early satiety         Recommendations:  -suspect most symptoms due to constipation. Will get Xray today to evaluate severity. May need mag citrate vs. Bowel prep. Would like for her not to be talking MoM daily. Linzess?  -History of pelvic floor dysfunction - may benefit from PFT.   -If symptoms continue, can discuss imaging vs need for scopes.  -Patient verbalizes understanding and agrees with plan.    Abdominal bloating  -     X-Ray Abdomen Flat And Erect; Future; Expected date: 06/03/2022    Constipation, unspecified constipation type  -     X-Ray Abdomen Flat And Erect; Future; Expected date: 06/03/2022    Fatigue, unspecified type  -     X-Ray Abdomen Flat And Erect; Future; Expected date: 06/03/2022    Early satiety  -     X-Ray Abdomen Flat And Erect; Future; Expected date: 06/03/2022        No follow-ups on file.    Thank you so much for allowing me to participate in the care of Jannet Soriano PA-C

## 2022-06-04 ENCOUNTER — TELEPHONE ENCOUNTER (OUTPATIENT)
Dept: URBAN - METROPOLITAN AREA CLINIC 68 | Facility: CLINIC | Age: 77
End: 2022-06-04

## 2022-06-05 ENCOUNTER — TELEPHONE ENCOUNTER (OUTPATIENT)
Dept: URBAN - METROPOLITAN AREA CLINIC 68 | Facility: CLINIC | Age: 77
End: 2022-06-05

## 2022-06-07 ENCOUNTER — NURSE TRIAGE (OUTPATIENT)
Dept: ADMINISTRATIVE | Facility: CLINIC | Age: 77
End: 2022-06-07
Payer: MEDICARE

## 2022-06-07 ENCOUNTER — TELEPHONE (OUTPATIENT)
Dept: GASTROENTEROLOGY | Facility: CLINIC | Age: 77
End: 2022-06-07
Payer: MEDICARE

## 2022-06-07 DIAGNOSIS — K59.00 CONSTIPATION, UNSPECIFIED CONSTIPATION TYPE: Primary | ICD-10-CM

## 2022-06-07 NOTE — TELEPHONE ENCOUNTER
----- Message from Liam Mata sent at 6/7/2022 11:27 AM CDT -----  Contact: [phone]  Pt is calling in about xray results, please return call, thanks

## 2022-06-07 NOTE — TELEPHONE ENCOUNTER
"Thinks she needs cardiac tests done. Had recent GI appt and was told she had tachycardia. Has been checking HR at home x 1 week and HR was at 115 with minimal exertion, HR will normalize quickly but also having increased fatigue. +new onset BLACK    Also burning to lower abd worse after eating.     [place]vised per [place]. [place] [facility] and [place]. [place] [facility] [place]     Reason for Disposition   MILD difficulty breathing (e.g., minimal/no SOB at rest, SOB with walking, pulse < 100) of new-onset or worse than normal    Additional Information   Negative: SEVERE difficulty breathing (e.g., struggling for each breath, speaks in single words, pulse > 120)   Negative: Breathing stopped and hasn't returned   Negative: Choking on something   Negative: Bluish (or gray) lips or face   Negative: Difficult to awaken or acting confused (e.g., disoriented, slurred speech)   Negative: Passed out (i.e., fainted, collapsed and was not responding)   Negative: Wheezing started suddenly after medicine, an allergic food, or bee sting   Negative: Stridor   Negative: Slow, shallow and weak breathing   Negative: Sounds like a life-threatening emergency to the triager   Negative: MODERATE difficulty breathing (e.g., speaks in phrases, SOB even at rest, pulse 100-120) of new-onset or worse than normal   Negative: Wheezing can be heard across the room   Negative: Drooling or spitting out saliva (because can't swallow)   Negative: Any history of prior "blood clot" in leg or lungs   Negative: Illness requiring prolonged bedrest in past month (e.g., immobilization, long hospital stay)   Negative: Hip or leg fracture (broken bone) in past month (or had cast on leg or ankle in past month)   Negative: Major surgery in the past month   Negative: Long-distance travel in past month (e.g., car, bus, train, plane; with trip lasting 6 or more hours)   Negative: Cancer treatment in past six months (or has cancer now)   Negative: " "Extra heart beats OR irregular heart beating (i.e., "palpitations")   Negative: Fever > 103 F (39.4 C)   Negative: Fever > 101 F (38.3 C) and over 60 years of age   Negative: Fever > 100.0 F (37.8 C) and bedridden (e.g., nursing home patient, stroke, chronic illness, recovering from surgery)   Negative: Fever > 100.0 F (37.8 C) and diabetes mellitus or weak immune system (e.g., HIV positive, cancer chemo, splenectomy, organ transplant, chronic steroids)   Negative: Periods where breathing stops and then resumes normally and bedridden (e.g., nursing home patient, CVA)   Negative: Pregnant or postpartum (from 0 to 6 weeks after delivery)   Negative: Patient sounds very sick or weak to the triager    Protocols used: BREATHING DIFFICULTY-A-[place]      "

## 2022-06-08 DIAGNOSIS — K59.00 CONSTIPATION, UNSPECIFIED CONSTIPATION TYPE: Primary | ICD-10-CM

## 2022-06-08 RX ORDER — LUBIPROSTONE 24 UG/1
24 CAPSULE ORAL
Qty: 30 CAPSULE | Refills: 2 | Status: SHIPPED | OUTPATIENT
Start: 2022-06-08 | End: 2023-11-02

## 2022-06-08 RX ORDER — SODIUM, POTASSIUM,MAG SULFATES 17.5-3.13G
1 SOLUTION, RECONSTITUTED, ORAL ORAL DAILY
Qty: 1 KIT | Refills: 0 | Status: SHIPPED | OUTPATIENT
Start: 2022-06-08 | End: 2022-06-10

## 2022-06-13 ENCOUNTER — HOSPITAL ENCOUNTER (OUTPATIENT)
Dept: RADIOLOGY | Facility: HOSPITAL | Age: 77
Discharge: HOME | End: 2022-06-13
Attending: FAMILY MEDICINE
Payer: MEDICARE

## 2022-06-13 ENCOUNTER — OFFICE VISIT (OUTPATIENT)
Dept: INTERNAL MEDICINE | Facility: CLINIC | Age: 77
End: 2022-06-13
Payer: MEDICARE

## 2022-06-13 VITALS
TEMPERATURE: 97 F | BODY MASS INDEX: 25.03 KG/M2 | SYSTOLIC BLOOD PRESSURE: 98 MMHG | HEART RATE: 102 BPM | OXYGEN SATURATION: 98 % | DIASTOLIC BLOOD PRESSURE: 60 MMHG | WEIGHT: 146.63 LBS | HEIGHT: 64 IN

## 2022-06-13 DIAGNOSIS — R10.33 PAIN, ABDOMINAL, PERIUMBILICAL: ICD-10-CM

## 2022-06-13 DIAGNOSIS — R53.83 FATIGUE, UNSPECIFIED TYPE: ICD-10-CM

## 2022-06-13 DIAGNOSIS — K59.09 CHRONIC CONSTIPATION: ICD-10-CM

## 2022-06-13 DIAGNOSIS — R00.0 TACHYCARDIA: Primary | ICD-10-CM

## 2022-06-13 PROCEDURE — 1101F PT FALLS ASSESS-DOCD LE1/YR: CPT | Mod: CPTII,S$GLB,, | Performed by: FAMILY MEDICINE

## 2022-06-13 PROCEDURE — 1101F PR PT FALLS ASSESS DOC 0-1 FALLS W/OUT INJ PAST YR: ICD-10-PCS | Mod: CPTII,S$GLB,, | Performed by: FAMILY MEDICINE

## 2022-06-13 PROCEDURE — 1126F PR PAIN SEVERITY QUANTIFIED, NO PAIN PRESENT: ICD-10-PCS | Mod: CPTII,S$GLB,, | Performed by: FAMILY MEDICINE

## 2022-06-13 PROCEDURE — 3288F FALL RISK ASSESSMENT DOCD: CPT | Mod: CPTII,S$GLB,, | Performed by: FAMILY MEDICINE

## 2022-06-13 PROCEDURE — 3044F PR MOST RECENT HEMOGLOBIN A1C LEVEL <7.0%: ICD-10-PCS | Mod: CPTII,S$GLB,, | Performed by: FAMILY MEDICINE

## 2022-06-13 PROCEDURE — 3074F SYST BP LT 130 MM HG: CPT | Mod: CPTII,S$GLB,, | Performed by: FAMILY MEDICINE

## 2022-06-13 PROCEDURE — 1159F MED LIST DOCD IN RCRD: CPT | Mod: CPTII,S$GLB,, | Performed by: FAMILY MEDICINE

## 2022-06-13 PROCEDURE — 3008F PR BODY MASS INDEX (BMI) DOCUMENTED: ICD-10-PCS | Mod: CPTII,S$GLB,, | Performed by: FAMILY MEDICINE

## 2022-06-13 PROCEDURE — 3074F PR MOST RECENT SYSTOLIC BLOOD PRESSURE < 130 MM HG: ICD-10-PCS | Mod: CPTII,S$GLB,, | Performed by: FAMILY MEDICINE

## 2022-06-13 PROCEDURE — 3078F DIAST BP <80 MM HG: CPT | Mod: CPTII,S$GLB,, | Performed by: FAMILY MEDICINE

## 2022-06-13 PROCEDURE — 1159F PR MEDICATION LIST DOCUMENTED IN MEDICAL RECORD: ICD-10-PCS | Mod: CPTII,S$GLB,, | Performed by: FAMILY MEDICINE

## 2022-06-13 PROCEDURE — 74018 RADEX ABDOMEN 1 VIEW: CPT | Mod: TC,PO

## 2022-06-13 PROCEDURE — 74018 RADEX ABDOMEN 1 VIEW: CPT | Mod: 26,,, | Performed by: RADIOLOGY

## 2022-06-13 PROCEDURE — 99999 PR PBB SHADOW E&M-EST. PATIENT-LVL V: ICD-10-PCS | Mod: PBBFAC,,, | Performed by: FAMILY MEDICINE

## 2022-06-13 PROCEDURE — 3288F PR FALLS RISK ASSESSMENT DOCUMENTED: ICD-10-PCS | Mod: CPTII,S$GLB,, | Performed by: FAMILY MEDICINE

## 2022-06-13 PROCEDURE — 1126F AMNT PAIN NOTED NONE PRSNT: CPT | Mod: CPTII,S$GLB,, | Performed by: FAMILY MEDICINE

## 2022-06-13 PROCEDURE — 99214 PR OFFICE/OUTPT VISIT, EST, LEVL IV, 30-39 MIN: ICD-10-PCS | Mod: S$GLB,,, | Performed by: FAMILY MEDICINE

## 2022-06-13 PROCEDURE — 99214 OFFICE O/P EST MOD 30 MIN: CPT | Mod: S$GLB,,, | Performed by: FAMILY MEDICINE

## 2022-06-13 PROCEDURE — 3008F BODY MASS INDEX DOCD: CPT | Mod: CPTII,S$GLB,, | Performed by: FAMILY MEDICINE

## 2022-06-13 PROCEDURE — 3078F PR MOST RECENT DIASTOLIC BLOOD PRESSURE < 80 MM HG: ICD-10-PCS | Mod: CPTII,S$GLB,, | Performed by: FAMILY MEDICINE

## 2022-06-13 PROCEDURE — 3044F HG A1C LEVEL LT 7.0%: CPT | Mod: CPTII,S$GLB,, | Performed by: FAMILY MEDICINE

## 2022-06-13 PROCEDURE — 99999 PR PBB SHADOW E&M-EST. PATIENT-LVL V: CPT | Mod: PBBFAC,,, | Performed by: FAMILY MEDICINE

## 2022-06-13 PROCEDURE — 74018 XR ABDOMEN AP 1 VIEW: ICD-10-PCS | Mod: 26,,, | Performed by: RADIOLOGY

## 2022-06-13 RX ORDER — CYSTEINE HCL 500 MG
1000 CAPSULE ORAL DAILY
COMMUNITY

## 2022-06-14 DIAGNOSIS — Z76.89 ESTABLISHING CARE WITH NEW DOCTOR, ENCOUNTER FOR: ICD-10-CM

## 2022-06-14 DIAGNOSIS — R00.0 TACHYCARDIA: Primary | ICD-10-CM

## 2022-06-14 NOTE — PROGRESS NOTES
Subjective:      Patient ID: Jannet Hayes is a 76 y.o. female.    Chief Complaint: Tachycardia, Shortness of Breath, Bloated, and Constipation      Patient reports having had COVID about a month ago,  tested positive, she had similar symptoms, did not bother to get seen.  Since that time having irregular shortness of breath and tachycardia, reports even the effort of getting dressed this morning to come here caused her heart rate to get into the 140s.  Any ambulation causes heart rate to go into the 120s.  Also reports abdominal bloating, periumbilical burning sensation which is worse postprandially.  Does have history of chronic constipation for years, saw a GI PA last week had x-ray which showed constipation.  She was prescribed amitiza but did not realize this had been sent in so had not tried it yet.  Reports has been taking milk of magnesia and other over-the-counter laxatives and having several bowel movements this week.  No change in burning periumbilical pain however.    Review of Systems   Constitutional: Positive for activity change and appetite change.   Respiratory: Positive for shortness of breath.    Cardiovascular: Positive for palpitations. Negative for chest pain.   Gastrointestinal: Positive for abdominal pain.     Past Medical History:   Diagnosis Date    Amnesia, global, transient 2019    Chronic constipation     Concussion 2020    History of IBS 2016    Skin cancer     Tremors of nervous system 2020    Vertigo 2020          Past Surgical History:   Procedure Laterality Date    ADENOIDECTOMY      LUMBAR LAMINECTOMY WITH SURGICAL REMOVAL OF LESION OF SPINAL CORD BY POSTERIOR APPROACH  1999     Family History   Problem Relation Age of Onset    Diabetes Mother     Stroke Mother     Lymphoma Mother     Heart disease Father      Social History     Socioeconomic History    Marital status:    Tobacco Use    Smoking status: Never Smoker    Smokeless tobacco: Never Used  "  Substance and Sexual Activity    Alcohol use: Yes     Comment: occas    Drug use: Never    Sexual activity: Not Currently     Review of patient's allergies indicates:   Allergen Reactions    Oxycodone-acetaminophen Anaphylaxis     HALLUCINATIONS    Codeine Other (See Comments)     hallucinations    Erythromycin Other (See Comments)     "odd thinking patterns, black dots in front of eyes"...."mycins"    Penicillins Hives     ITCHING, SWELLING.  Patient states "can take Keflex without any problems".       Objective:       BP 98/60 (BP Location: Right arm, Patient Position: Sitting, BP Method: Large (Manual))   Pulse 102   Temp 97.4 °F (36.3 °C) (Tympanic)   Ht 5' 4" (1.626 m)   Wt 66.5 kg (146 lb 9.7 oz)   SpO2 98%   BMI 25.16 kg/m²   Physical Exam  Vitals and nursing note reviewed.   Constitutional:       General: She is not in acute distress.     Appearance: Normal appearance. She is well-developed. She is not ill-appearing or diaphoretic.   Cardiovascular:      Rate and Rhythm: Normal rate and regular rhythm.      Heart sounds: Normal heart sounds.   Pulmonary:      Effort: Pulmonary effort is normal. No respiratory distress.      Breath sounds: Normal breath sounds.   Abdominal:      General: Bowel sounds are normal.      Palpations: Abdomen is soft.      Tenderness: There is no abdominal tenderness. There is no guarding or rebound.   Neurological:      Mental Status: She is alert and oriented to person, place, and time.   Psychiatric:         Mood and Affect: Mood normal.         Behavior: Behavior normal.         Thought Content: Thought content normal.         Judgment: Judgment normal.       Assessment:     1. Tachycardia    2. Fatigue, unspecified type    3. Pain, abdominal, periumbilical    4. Chronic constipation      Plan:   Tachycardia  -     Ambulatory referral/consult to Cardiology; Future; Expected date: 06/20/2022    Fatigue, unspecified type    Pain, abdominal, periumbilical  -     H. " pylori antigen, stool; Future; Expected date: 06/13/2022  -     X-Ray Abdomen AP 1 View; Future; Expected date: 06/13/2022    Chronic constipation    Abdominal x-ray today shows some some gas in transverse colon, otherwise normal  She will try the Amitiza  Suspect long COVID symptoms, will send to Cardiology to get checked out  Medication List with Changes/Refills   Current Medications    ACETAMINOPHEN (TYLENOL) 500 MG TABLET    Take 500 mg by mouth as needed.    ANTIARTHRITIC COMBINATION NO.2 900 MG TAB    Take 2 tablets by mouth once.    BIMATOPROST (LATISSE) 0.03 % OPHTHALMIC SOLUTION    Place one drop on applicator and apply evenly along the skin of the upper eyelid at base of eyelashes once daily at bedtime; repeat procedure for second eye (use a clean applicator).    COD LIVER OIL ORAL    Take 1,000 mg by mouth once daily at 6am.    ESTRADIOL (VIVELLE-DOT) 0.075 MG/24 HR    Place onto the skin.    LUBIPROSTONE (AMITIZA) 24 MCG CAP    Take 1 capsule (24 mcg total) by mouth daily with breakfast.    MAGNESIUM HYDROXIDE 400 MG/5 ML (MILK OF MAGNESIA) 400 MG/5 ML SUSP    Take 30 mLs by mouth once daily.    MULTIVIT-IRON-FA-CALCIUM-MINS (THERA-TABS M) 27 MG IRON-400 MCG TAB    Take 1 tablet by mouth once daily at 6am.    NON FORMULARY MEDICATION    1 tablet.    OMEGA-3 FATTY ACIDS-FISH -1,000 MG CAP    Take 1,000 mg by mouth once daily at 6am.    PHOSPHATIDYL SERINE, BULK, MISC    300 mg by Misc.(Non-Drug; Combo Route) route once daily.    PROGESTERONE (PROMETRIUM) 100 MG CAPSULE    Take 100 mg by mouth nightly.    TANNIC/CHESTNUT/PEPPERMINT (ATRANTIL ORAL)    Take 550 mg by mouth once daily at 6am.    TAURINE 1,000 MG CAP    Take 1,000 mg by mouth once daily.    TURMERIC ROOT EXTRACT 1,053 MG TAB    Take 1 tablet by mouth.    VITAMIN D (VITAMIN D3) 1000 UNITS TAB    Take by mouth.   Discontinued Medications    GABAPENTIN (NEURONTIN) 300 MG CAPSULE    Take 1 capsule (300 mg total) by mouth every evening.

## 2022-06-15 ENCOUNTER — OFFICE VISIT (OUTPATIENT)
Dept: CARDIOLOGY | Facility: CLINIC | Age: 77
End: 2022-06-15
Payer: MEDICARE

## 2022-06-15 ENCOUNTER — HOSPITAL ENCOUNTER (OUTPATIENT)
Dept: CARDIOLOGY | Facility: HOSPITAL | Age: 77
Discharge: HOME | End: 2022-06-15
Attending: STUDENT IN AN ORGANIZED HEALTH CARE EDUCATION/TRAINING PROGRAM
Payer: MEDICARE

## 2022-06-15 VITALS
DIASTOLIC BLOOD PRESSURE: 70 MMHG | WEIGHT: 145.31 LBS | OXYGEN SATURATION: 98 % | HEART RATE: 90 BPM | BODY MASS INDEX: 24.81 KG/M2 | HEIGHT: 64 IN | SYSTOLIC BLOOD PRESSURE: 104 MMHG

## 2022-06-15 DIAGNOSIS — R00.0 TACHYCARDIA: ICD-10-CM

## 2022-06-15 DIAGNOSIS — R42 VERTIGO: ICD-10-CM

## 2022-06-15 DIAGNOSIS — K21.9 GASTROESOPHAGEAL REFLUX DISEASE, UNSPECIFIED WHETHER ESOPHAGITIS PRESENT: ICD-10-CM

## 2022-06-15 DIAGNOSIS — Z76.89 ESTABLISHING CARE WITH NEW DOCTOR, ENCOUNTER FOR: ICD-10-CM

## 2022-06-15 DIAGNOSIS — K58.9 IRRITABLE BOWEL SYNDROME, UNSPECIFIED TYPE: ICD-10-CM

## 2022-06-15 DIAGNOSIS — R06.02 SOB (SHORTNESS OF BREATH): ICD-10-CM

## 2022-06-15 DIAGNOSIS — R00.0 TACHYCARDIA: Primary | ICD-10-CM

## 2022-06-15 PROCEDURE — 1126F AMNT PAIN NOTED NONE PRSNT: CPT | Mod: CPTII,S$GLB,, | Performed by: STUDENT IN AN ORGANIZED HEALTH CARE EDUCATION/TRAINING PROGRAM

## 2022-06-15 PROCEDURE — 99999 PR PBB SHADOW E&M-EST. PATIENT-LVL V: CPT | Mod: PBBFAC,,, | Performed by: STUDENT IN AN ORGANIZED HEALTH CARE EDUCATION/TRAINING PROGRAM

## 2022-06-15 PROCEDURE — 3074F PR MOST RECENT SYSTOLIC BLOOD PRESSURE < 130 MM HG: ICD-10-PCS | Mod: CPTII,S$GLB,, | Performed by: STUDENT IN AN ORGANIZED HEALTH CARE EDUCATION/TRAINING PROGRAM

## 2022-06-15 PROCEDURE — 3288F PR FALLS RISK ASSESSMENT DOCUMENTED: ICD-10-PCS | Mod: CPTII,S$GLB,, | Performed by: STUDENT IN AN ORGANIZED HEALTH CARE EDUCATION/TRAINING PROGRAM

## 2022-06-15 PROCEDURE — 99214 PR OFFICE/OUTPT VISIT, EST, LEVL IV, 30-39 MIN: ICD-10-PCS | Mod: S$GLB,,, | Performed by: STUDENT IN AN ORGANIZED HEALTH CARE EDUCATION/TRAINING PROGRAM

## 2022-06-15 PROCEDURE — 1101F PR PT FALLS ASSESS DOC 0-1 FALLS W/OUT INJ PAST YR: ICD-10-PCS | Mod: CPTII,S$GLB,, | Performed by: STUDENT IN AN ORGANIZED HEALTH CARE EDUCATION/TRAINING PROGRAM

## 2022-06-15 PROCEDURE — 1159F MED LIST DOCD IN RCRD: CPT | Mod: CPTII,S$GLB,, | Performed by: STUDENT IN AN ORGANIZED HEALTH CARE EDUCATION/TRAINING PROGRAM

## 2022-06-15 PROCEDURE — 3044F HG A1C LEVEL LT 7.0%: CPT | Mod: CPTII,S$GLB,, | Performed by: STUDENT IN AN ORGANIZED HEALTH CARE EDUCATION/TRAINING PROGRAM

## 2022-06-15 PROCEDURE — 3044F PR MOST RECENT HEMOGLOBIN A1C LEVEL <7.0%: ICD-10-PCS | Mod: CPTII,S$GLB,, | Performed by: STUDENT IN AN ORGANIZED HEALTH CARE EDUCATION/TRAINING PROGRAM

## 2022-06-15 PROCEDURE — 3288F FALL RISK ASSESSMENT DOCD: CPT | Mod: CPTII,S$GLB,, | Performed by: STUDENT IN AN ORGANIZED HEALTH CARE EDUCATION/TRAINING PROGRAM

## 2022-06-15 PROCEDURE — 3078F DIAST BP <80 MM HG: CPT | Mod: CPTII,S$GLB,, | Performed by: STUDENT IN AN ORGANIZED HEALTH CARE EDUCATION/TRAINING PROGRAM

## 2022-06-15 PROCEDURE — 3074F SYST BP LT 130 MM HG: CPT | Mod: CPTII,S$GLB,, | Performed by: STUDENT IN AN ORGANIZED HEALTH CARE EDUCATION/TRAINING PROGRAM

## 2022-06-15 PROCEDURE — 1126F PR PAIN SEVERITY QUANTIFIED, NO PAIN PRESENT: ICD-10-PCS | Mod: CPTII,S$GLB,, | Performed by: STUDENT IN AN ORGANIZED HEALTH CARE EDUCATION/TRAINING PROGRAM

## 2022-06-15 PROCEDURE — 3008F PR BODY MASS INDEX (BMI) DOCUMENTED: ICD-10-PCS | Mod: CPTII,S$GLB,, | Performed by: STUDENT IN AN ORGANIZED HEALTH CARE EDUCATION/TRAINING PROGRAM

## 2022-06-15 PROCEDURE — 1159F PR MEDICATION LIST DOCUMENTED IN MEDICAL RECORD: ICD-10-PCS | Mod: CPTII,S$GLB,, | Performed by: STUDENT IN AN ORGANIZED HEALTH CARE EDUCATION/TRAINING PROGRAM

## 2022-06-15 PROCEDURE — 93010 ELECTROCARDIOGRAM REPORT: CPT | Mod: ,,, | Performed by: INTERNAL MEDICINE

## 2022-06-15 PROCEDURE — 99999 PR PBB SHADOW E&M-EST. PATIENT-LVL V: ICD-10-PCS | Mod: PBBFAC,,, | Performed by: STUDENT IN AN ORGANIZED HEALTH CARE EDUCATION/TRAINING PROGRAM

## 2022-06-15 PROCEDURE — 99214 OFFICE O/P EST MOD 30 MIN: CPT | Mod: S$GLB,,, | Performed by: STUDENT IN AN ORGANIZED HEALTH CARE EDUCATION/TRAINING PROGRAM

## 2022-06-15 PROCEDURE — 93010 EKG 12-LEAD: ICD-10-PCS | Mod: ,,, | Performed by: INTERNAL MEDICINE

## 2022-06-15 PROCEDURE — 93005 ELECTROCARDIOGRAM TRACING: CPT

## 2022-06-15 PROCEDURE — 3008F BODY MASS INDEX DOCD: CPT | Mod: CPTII,S$GLB,, | Performed by: STUDENT IN AN ORGANIZED HEALTH CARE EDUCATION/TRAINING PROGRAM

## 2022-06-15 PROCEDURE — 1101F PT FALLS ASSESS-DOCD LE1/YR: CPT | Mod: CPTII,S$GLB,, | Performed by: STUDENT IN AN ORGANIZED HEALTH CARE EDUCATION/TRAINING PROGRAM

## 2022-06-15 PROCEDURE — 3078F PR MOST RECENT DIASTOLIC BLOOD PRESSURE < 80 MM HG: ICD-10-PCS | Mod: CPTII,S$GLB,, | Performed by: STUDENT IN AN ORGANIZED HEALTH CARE EDUCATION/TRAINING PROGRAM

## 2022-06-15 NOTE — PROGRESS NOTES
"EKG                Section of Cardiology                  Cardiac Clinic Note    Chief Complaint/Reason for consultation:  Tachycardia      HPI:   Jannet Hayes is a 76 y.o. female with h/o vertigo, tremors, irritable bowel syndrome who was referred to cardiology clinic by PCP Dr. Aburto for evaluation.    6/15/2022  Reports being COVID about 1 month ago she believes, but did not get tested  Reports cough and SOB  Has had tachycardia, HR 140s with minimal exertion, taking a shower, putting on make up  Reports some SOB that started about 1 year ago, but worsening over the last few weeks, with minimal exertion. Palpitations with the SOB   Was having her symptoms prior to COVID  Reports bad GERD which can cause chest discomfort   Does not exercise regularly     Denies tobacco abuse. ETOH occ  Denies syncope,   Denies CVA, DM, HTN   Family hx: uncle- possible MI, Gf-  of MI at 80 yo possibly      EKG 6/15/2022 NSR, no acute ST - T wave changes,  ms    ECHO      STRESS TEST    [facility]      ROS: All 10 systems reviewed. Please refer to the HPI for pertinent positives. All other systems negative.     Past Medical History  Past Medical History:   Diagnosis Date    Amnesia, global, transient 2019    Chronic constipation     Concussion 2020    History of IBS 2016    Skin cancer     Tremors of nervous system 2020    Vertigo 2020       Surgical History  Past Surgical History:   Procedure Laterality Date    ADENOIDECTOMY      LUMBAR LAMINECTOMY WITH SURGICAL REMOVAL OF LESION OF SPINAL CORD BY POSTERIOR APPROACH            Allergies:   Review of patient's allergies indicates:   Allergen Reactions    Oxycodone-acetaminophen Anaphylaxis     HALLUCINATIONS    Codeine Other (See Comments)     hallucinations    Erythromycin Other (See Comments)     "odd thinking patterns, black dots in front of eyes"...."mycins"    Penicillins Hives     ITCHING, SWELLING.  Patient states "can take Keflex without any " "problems".       Social History:  Social History     Socioeconomic History    Marital status:    Tobacco Use    Smoking status: Never Smoker    Smokeless tobacco: Never Used   Substance and Sexual Activity    Alcohol use: Yes     Comment: occas    Drug use: Never    Sexual activity: Not Currently       Family History:  family history includes Diabetes in her mother; Heart disease in her father; Lymphoma in her mother; Stroke in her mother.    Home Medications:  Current Outpatient Medications on File Prior to Visit   Medication Sig Dispense Refill    acetaminophen (TYLENOL) 500 MG tablet Take 500 mg by mouth as needed.      antiarthritic combination no.2 900 mg Tab Take 2 tablets by mouth once.      bimatoprost (LATISSE) 0.03 % ophthalmic solution Place one drop on applicator and apply evenly along the skin of the upper eyelid at base of eyelashes once daily at bedtime; repeat procedure for second eye (use a clean applicator). 5 mL 11    COD LIVER OIL ORAL Take 1,000 mg by mouth once daily at 6am.      estradioL (VIVELLE-DOT) 0.075 mg/24 hr Place onto the skin.      magnesium hydroxide 400 mg/5 ml (MILK OF MAGNESIA) 400 mg/5 mL Susp Take 30 mLs by mouth once daily.      multivit-iron-FA-calcium-mins (THERA-TABS M) 27 mg iron-400 mcg Tab Take 1 tablet by mouth once daily at 6am.      omega-3 fatty acids-fish oil 340-1,000 mg Cap Take 1,000 mg by mouth once daily at 6am.      PHOSPHATIDYL SERINE, BULK, MISC 300 mg by Misc.(Non-Drug; Combo Route) route once daily.      progesterone (PROMETRIUM) 100 MG capsule Take 100 mg by mouth nightly.      tannic/chestnut/peppermint (ATRANTIL ORAL) Take 550 mg by mouth once daily at 6am.      taurine 1,000 mg Cap Take 1,000 mg by mouth once daily.      turmeric root extract 1,053 mg Tab Take 1 tablet by mouth.      vitamin D (VITAMIN D3) 1000 units Tab Take by mouth.      lubiprostone (AMITIZA) 24 MCG Cap Take 1 capsule (24 mcg total) by mouth daily with " "breakfast. (Patient not taking: Reported on 6/13/2022) 30 capsule 2    NON FORMULARY MEDICATION 1 tablet.       No current facility-administered medications on file prior to visit.       Physical exam:  /70 (BP Location: Right arm, Patient Position: Sitting, BP Method: Medium (Manual))   Pulse 90   Ht 5' 4" (1.626 m)   Wt 65.9 kg (145 lb 4.5 oz)   SpO2 98%   BMI 24.94 kg/m²         General: Pt is a 76 y.o. year old female who is AAOx3, in NAD, is pleasant, well nourished, looks stated age  HEENT: PERRL, EOMI, Oral mucosa pink & moist  CVS  No abnormal cardiac pulsations noted on inspection. JVP not raised. The apical impulse is normal on palpation, and is located in the left 5th intercostal space in the mid - clavicular line. No palpable thrills or abnormal pulsations noted. RR, S1 - S2 heard, no murmurs, rubs or gallops appreciated.   PUL : CTA B/L. No wheezes/crackles heard   ABD : BS +, soft. No tenderness elicited   LE : No C/C/E. Distal Pulses palpable B/L         LABS:    Chemistry:   Lab Results   Component Value Date     03/21/2022    K 4.1 03/21/2022     03/21/2022    CO2 24 03/21/2022    BUN 16 03/21/2022    CREATININE 0.9 03/21/2022    CALCIUM 10.1 03/21/2022     Cardiac Markers: No results found for: CKTOTAL, CKMB, CKMBINDEX, TROPONINI  Cardiac Markers (Last 3): No results found for: CKTOTAL, CKMB, CKMBINDEX, TROPONINI  CBC:   Lab Results   Component Value Date    WBC 7.33 03/21/2022    HGB 14.1 03/21/2022    HCT 44.3 03/21/2022    MCV 96 03/21/2022     03/21/2022     Lipids:   Lab Results   Component Value Date    CHOL 218 (H) 03/21/2022    TRIG 132 03/21/2022    HDL 91 (H) 03/21/2022     Coagulation: No results found for: PT, INR, APTT        Assessment    1. Tachycardia    2. Vertigo    3. Irritable bowel syndrome, unspecified type    4. Gastroesophageal reflux disease, unspecified whether esophagitis present    5. SOB (shortness of breath)     " "    Plan:    Tachycardia/shortness of breath  Has been worsening, intermittent atypical chest pain  Obtain 7 day cardiac monitor  Obtain echocardiogram  Obtain ETT    Vertigo  Stable  No current symptoms     IBS/GERD  Has been uncontrolled, has bad "gas"  Currently not on medication       This note was prepared using voice recognition system and is likely to have sound alike errors that may have been overlooked even after proofreading.     I have reviewed all pertinent chart information.  Plans and recommendations have been formulated under my direct supervision. All questions answered and patient voiced understanding.   If symptoms persist go to the ED.    RTC in 1 month         Itzel Mcdermott MD  Cardiology          "

## 2022-06-25 ENCOUNTER — TELEPHONE ENCOUNTER (OUTPATIENT)
Age: 77
End: 2022-06-25

## 2022-06-26 ENCOUNTER — TELEPHONE ENCOUNTER (OUTPATIENT)
Age: 77
End: 2022-06-26

## 2022-06-27 ENCOUNTER — HOSPITAL ENCOUNTER (OUTPATIENT)
Dept: CARDIOLOGY | Facility: HOSPITAL | Age: 77
Discharge: HOME | End: 2022-06-27
Attending: STUDENT IN AN ORGANIZED HEALTH CARE EDUCATION/TRAINING PROGRAM
Payer: MEDICARE

## 2022-06-27 VITALS
WEIGHT: 145 LBS | DIASTOLIC BLOOD PRESSURE: 70 MMHG | DIASTOLIC BLOOD PRESSURE: 83 MMHG | HEIGHT: 64 IN | HEIGHT: 64 IN | WEIGHT: 145 LBS | SYSTOLIC BLOOD PRESSURE: 130 MMHG | BODY MASS INDEX: 24.75 KG/M2 | SYSTOLIC BLOOD PRESSURE: 108 MMHG | BODY MASS INDEX: 24.75 KG/M2 | HEART RATE: 100 BPM

## 2022-06-27 DIAGNOSIS — R00.0 TACHYCARDIA: ICD-10-CM

## 2022-06-27 DIAGNOSIS — K21.9 GASTROESOPHAGEAL REFLUX DISEASE, UNSPECIFIED WHETHER ESOPHAGITIS PRESENT: ICD-10-CM

## 2022-06-27 DIAGNOSIS — R42 VERTIGO: ICD-10-CM

## 2022-06-27 DIAGNOSIS — K58.9 IRRITABLE BOWEL SYNDROME, UNSPECIFIED TYPE: ICD-10-CM

## 2022-06-27 DIAGNOSIS — R06.02 SOB (SHORTNESS OF BREATH): ICD-10-CM

## 2022-06-27 LAB
AORTIC ROOT ANNULUS: 2.75 CM
ASCENDING AORTA: 2.68 CM
AV INDEX (PROSTH): 0.93
AV MEAN GRADIENT: 1 MMHG
AV PEAK GRADIENT: 3 MMHG
AV VALVE AREA: 2.78 CM2
AV VELOCITY RATIO: 0.87
BSA FOR ECHO PROCEDURE: 1.72 M2
CV ECHO LV RWT: 0.46 CM
CV STRESS BASE HR: 100 BPM
DIASTOLIC BLOOD PRESSURE: 83 MMHG
DOP CALC AO PEAK VEL: 0.82 M/S
DOP CALC AO VTI: 14.8 CM
DOP CALC LVOT AREA: 3 CM2
DOP CALC LVOT DIAMETER: 1.95 CM
DOP CALC LVOT PEAK VEL: 0.71 M/S
DOP CALC LVOT STROKE VOLUME: 41.19 CM3
DOP CALC RVOT PEAK VEL: 0.77 M/S
DOP CALC RVOT VTI: 13.8 CM
DOP CALCLVOT PEAK VEL VTI: 13.8 CM
E WAVE DECELERATION TIME: 260.25 MSEC
E/A RATIO: 0.58
E/E' RATIO: 6.92 M/S
ECHO LV POSTERIOR WALL: 0.77 CM (ref 0.6–1.1)
EJECTION FRACTION: 60 %
FRACTIONAL SHORTENING: 32 % (ref 28–44)
INTERVENTRICULAR SEPTUM: 0.85 CM (ref 0.6–1.1)
IVC DIAMETER: 0.95 CM
LA MAJOR: 2.77 CM
LA WIDTH: 2.7 CM
LEFT ATRIUM SIZE: 2.56 CM
LEFT INTERNAL DIMENSION IN SYSTOLE: 2.27 CM (ref 2.1–4)
LEFT VENTRICLE DIASTOLIC VOLUME INDEX: 27.01 ML/M2
LEFT VENTRICLE DIASTOLIC VOLUME: 46.18 ML
LEFT VENTRICLE MASS INDEX: 42 G/M2
LEFT VENTRICLE SYSTOLIC VOLUME INDEX: 10.2 ML/M2
LEFT VENTRICLE SYSTOLIC VOLUME: 17.49 ML
LEFT VENTRICULAR INTERNAL DIMENSION IN DIASTOLE: 3.36 CM (ref 3.5–6)
LEFT VENTRICULAR MASS: 71.8 G
LV LATERAL E/E' RATIO: 6.43 M/S
LV SEPTAL E/E' RATIO: 7.5 M/S
LVOT MG: 0.98 MMHG
LVOT MV: 0.45 CM/S
MV PEAK A VEL: 0.78 M/S
MV PEAK E VEL: 0.45 M/S
MV STENOSIS PRESSURE HALF TIME: 75.47 MS
MV VALVE AREA P 1/2 METHOD: 2.91 CM2
OHS CV CPX 1 MINUTE RECOVERY HEART RATE: 151 BPM
OHS CV CPX 85 PERCENT MAX PREDICTED HEART RATE MALE: 120
OHS CV CPX ESTIMATED METS: 7
OHS CV CPX MAX PREDICTED HEART RATE: 141
OHS CV CPX PATIENT IS FEMALE: 1
OHS CV CPX PATIENT IS MALE: 0
OHS CV CPX PEAK DIASTOLIC BLOOD PRESSURE: 77 MMHG
OHS CV CPX PEAK HEAR RATE: 184 BPM
OHS CV CPX PEAK RATE PRESSURE PRODUCT: NORMAL
OHS CV CPX PEAK SYSTOLIC BLOOD PRESSURE: 231 MMHG
OHS CV CPX PERCENT MAX PREDICTED HEART RATE ACHIEVED: 131
OHS CV CPX RATE PRESSURE PRODUCT PRESENTING: NORMAL
PISA TR MAX VEL: 2.22 M/S
PV MEAN GRADIENT: 1.34 MMHG
PV MV: 0.57 M/S
PV PEAK VELOCITY: 0.82 CM/S
RA PRESSURE: 3 MMHG
RIGHT VENTRICULAR END-DIASTOLIC DIMENSION: 2.53 CM
SINUS: 2.86 CM
STJ: 2.46 CM
STRESS ECHO POST EXERCISE DUR MIN: 4 MINUTES
STRESS ECHO POST EXERCISE DUR SEC: 11 SECONDS
SYSTOLIC BLOOD PRESSURE: 130 MMHG
TDI LATERAL: 0.07 M/S
TDI SEPTAL: 0.06 M/S
TDI: 0.07 M/S
TR MAX PG: 20 MMHG
TV REST PULMONARY ARTERY PRESSURE: 23 MMHG

## 2022-06-27 PROCEDURE — 93248 EXT ECG>7D<15D REV&INTERPJ: CPT | Mod: ,,, | Performed by: STUDENT IN AN ORGANIZED HEALTH CARE EDUCATION/TRAINING PROGRAM

## 2022-06-27 PROCEDURE — 93018 EXERCISE STRESS - EKG (CUPID ONLY): ICD-10-PCS | Mod: ,,, | Performed by: INTERNAL MEDICINE

## 2022-06-27 PROCEDURE — 93306 TTE W/DOPPLER COMPLETE: CPT | Mod: 26,,, | Performed by: INTERNAL MEDICINE

## 2022-06-27 PROCEDURE — 93248 CV CARDIAC MONITOR - 3-15 DAY ADULT (CUPID ONLY): ICD-10-PCS | Mod: ,,, | Performed by: STUDENT IN AN ORGANIZED HEALTH CARE EDUCATION/TRAINING PROGRAM

## 2022-06-27 PROCEDURE — 93016 EXERCISE STRESS - EKG (CUPID ONLY): ICD-10-PCS | Mod: ,,, | Performed by: INTERNAL MEDICINE

## 2022-06-27 PROCEDURE — 93306 TTE W/DOPPLER COMPLETE: CPT

## 2022-06-27 PROCEDURE — 93306 ECHO (CUPID ONLY): ICD-10-PCS | Mod: 26,,, | Performed by: INTERNAL MEDICINE

## 2022-06-27 PROCEDURE — 93017 CV STRESS TEST TRACING ONLY: CPT

## 2022-06-27 PROCEDURE — 93016 CV STRESS TEST SUPVJ ONLY: CPT | Mod: ,,, | Performed by: INTERNAL MEDICINE

## 2022-06-27 PROCEDURE — 93246 EXT ECG>7D<15D RECORDING: CPT

## 2022-06-27 PROCEDURE — 93018 CV STRESS TEST I&R ONLY: CPT | Mod: ,,, | Performed by: INTERNAL MEDICINE

## 2022-06-28 ENCOUNTER — TELEPHONE (OUTPATIENT)
Dept: CARDIOLOGY | Facility: CLINIC | Age: 77
End: 2022-06-28
Payer: MEDICARE

## 2022-06-28 NOTE — TELEPHONE ENCOUNTER
Patient was notified of results. All questions were answered. Pt verbalized understanding. Pt will call back with any other questions or concerns.    ----- Message from Eleazar Motta MD sent at 6/28/2022 10:41 AM CDT -----  Stress test shows no heart strain

## 2022-06-28 NOTE — TELEPHONE ENCOUNTER
----- Message from Eleazar Motta MD sent at 6/28/2022 10:41 AM CDT -----  Stress test shows no heart strain

## 2022-06-28 NOTE — TELEPHONE ENCOUNTER
Patient was notified of results. All questions were answered. Pt verbalized understanding. Pt will call back with any other questions or concerns.      ----- Message from Eleazar Motta MD sent at 6/28/2022 10:43 AM CDT -----  Heart function is normal.

## 2022-07-16 LAB
OHS CV HOLTER SINUS AVERAGE HR: 80
OHS CV HOLTER SINUS MAX HR: 151
OHS CV HOLTER SINUS MIN HR: 60

## 2022-07-20 ENCOUNTER — OFFICE VISIT (OUTPATIENT)
Dept: CARDIOLOGY | Facility: CLINIC | Age: 77
End: 2022-07-20
Payer: MEDICARE

## 2022-07-20 VITALS
DIASTOLIC BLOOD PRESSURE: 80 MMHG | HEIGHT: 64 IN | SYSTOLIC BLOOD PRESSURE: 120 MMHG | WEIGHT: 147.5 LBS | BODY MASS INDEX: 25.18 KG/M2 | HEART RATE: 82 BPM | OXYGEN SATURATION: 96 %

## 2022-07-20 DIAGNOSIS — K58.9 IRRITABLE BOWEL SYNDROME, UNSPECIFIED TYPE: ICD-10-CM

## 2022-07-20 DIAGNOSIS — K21.9 GASTROESOPHAGEAL REFLUX DISEASE, UNSPECIFIED WHETHER ESOPHAGITIS PRESENT: ICD-10-CM

## 2022-07-20 DIAGNOSIS — R00.0 TACHYCARDIA: Primary | ICD-10-CM

## 2022-07-20 DIAGNOSIS — R06.02 SOB (SHORTNESS OF BREATH): ICD-10-CM

## 2022-07-20 DIAGNOSIS — R42 VERTIGO: ICD-10-CM

## 2022-07-20 PROCEDURE — 1159F PR MEDICATION LIST DOCUMENTED IN MEDICAL RECORD: ICD-10-PCS | Mod: CPTII,S$GLB,, | Performed by: STUDENT IN AN ORGANIZED HEALTH CARE EDUCATION/TRAINING PROGRAM

## 2022-07-20 PROCEDURE — 3008F BODY MASS INDEX DOCD: CPT | Mod: CPTII,S$GLB,, | Performed by: STUDENT IN AN ORGANIZED HEALTH CARE EDUCATION/TRAINING PROGRAM

## 2022-07-20 PROCEDURE — 3074F SYST BP LT 130 MM HG: CPT | Mod: CPTII,S$GLB,, | Performed by: STUDENT IN AN ORGANIZED HEALTH CARE EDUCATION/TRAINING PROGRAM

## 2022-07-20 PROCEDURE — 1126F PR PAIN SEVERITY QUANTIFIED, NO PAIN PRESENT: ICD-10-PCS | Mod: CPTII,S$GLB,, | Performed by: STUDENT IN AN ORGANIZED HEALTH CARE EDUCATION/TRAINING PROGRAM

## 2022-07-20 PROCEDURE — 99999 PR PBB SHADOW E&M-EST. PATIENT-LVL IV: CPT | Mod: PBBFAC,,, | Performed by: STUDENT IN AN ORGANIZED HEALTH CARE EDUCATION/TRAINING PROGRAM

## 2022-07-20 PROCEDURE — 3008F PR BODY MASS INDEX (BMI) DOCUMENTED: ICD-10-PCS | Mod: CPTII,S$GLB,, | Performed by: STUDENT IN AN ORGANIZED HEALTH CARE EDUCATION/TRAINING PROGRAM

## 2022-07-20 PROCEDURE — 3044F PR MOST RECENT HEMOGLOBIN A1C LEVEL <7.0%: ICD-10-PCS | Mod: CPTII,S$GLB,, | Performed by: STUDENT IN AN ORGANIZED HEALTH CARE EDUCATION/TRAINING PROGRAM

## 2022-07-20 PROCEDURE — 3079F DIAST BP 80-89 MM HG: CPT | Mod: CPTII,S$GLB,, | Performed by: STUDENT IN AN ORGANIZED HEALTH CARE EDUCATION/TRAINING PROGRAM

## 2022-07-20 PROCEDURE — 99214 PR OFFICE/OUTPT VISIT, EST, LEVL IV, 30-39 MIN: ICD-10-PCS | Mod: S$GLB,,, | Performed by: STUDENT IN AN ORGANIZED HEALTH CARE EDUCATION/TRAINING PROGRAM

## 2022-07-20 PROCEDURE — 3074F PR MOST RECENT SYSTOLIC BLOOD PRESSURE < 130 MM HG: ICD-10-PCS | Mod: CPTII,S$GLB,, | Performed by: STUDENT IN AN ORGANIZED HEALTH CARE EDUCATION/TRAINING PROGRAM

## 2022-07-20 PROCEDURE — 3044F HG A1C LEVEL LT 7.0%: CPT | Mod: CPTII,S$GLB,, | Performed by: STUDENT IN AN ORGANIZED HEALTH CARE EDUCATION/TRAINING PROGRAM

## 2022-07-20 PROCEDURE — 3288F PR FALLS RISK ASSESSMENT DOCUMENTED: ICD-10-PCS | Mod: CPTII,S$GLB,, | Performed by: STUDENT IN AN ORGANIZED HEALTH CARE EDUCATION/TRAINING PROGRAM

## 2022-07-20 PROCEDURE — 3079F PR MOST RECENT DIASTOLIC BLOOD PRESSURE 80-89 MM HG: ICD-10-PCS | Mod: CPTII,S$GLB,, | Performed by: STUDENT IN AN ORGANIZED HEALTH CARE EDUCATION/TRAINING PROGRAM

## 2022-07-20 PROCEDURE — 1126F AMNT PAIN NOTED NONE PRSNT: CPT | Mod: CPTII,S$GLB,, | Performed by: STUDENT IN AN ORGANIZED HEALTH CARE EDUCATION/TRAINING PROGRAM

## 2022-07-20 PROCEDURE — 1101F PR PT FALLS ASSESS DOC 0-1 FALLS W/OUT INJ PAST YR: ICD-10-PCS | Mod: CPTII,S$GLB,, | Performed by: STUDENT IN AN ORGANIZED HEALTH CARE EDUCATION/TRAINING PROGRAM

## 2022-07-20 PROCEDURE — 1101F PT FALLS ASSESS-DOCD LE1/YR: CPT | Mod: CPTII,S$GLB,, | Performed by: STUDENT IN AN ORGANIZED HEALTH CARE EDUCATION/TRAINING PROGRAM

## 2022-07-20 PROCEDURE — 1159F MED LIST DOCD IN RCRD: CPT | Mod: CPTII,S$GLB,, | Performed by: STUDENT IN AN ORGANIZED HEALTH CARE EDUCATION/TRAINING PROGRAM

## 2022-07-20 PROCEDURE — 99214 OFFICE O/P EST MOD 30 MIN: CPT | Mod: S$GLB,,, | Performed by: STUDENT IN AN ORGANIZED HEALTH CARE EDUCATION/TRAINING PROGRAM

## 2022-07-20 PROCEDURE — 99999 PR PBB SHADOW E&M-EST. PATIENT-LVL IV: ICD-10-PCS | Mod: PBBFAC,,, | Performed by: STUDENT IN AN ORGANIZED HEALTH CARE EDUCATION/TRAINING PROGRAM

## 2022-07-20 PROCEDURE — 3288F FALL RISK ASSESSMENT DOCD: CPT | Mod: CPTII,S$GLB,, | Performed by: STUDENT IN AN ORGANIZED HEALTH CARE EDUCATION/TRAINING PROGRAM

## 2022-07-20 NOTE — PROGRESS NOTES
Section of Cardiology                  Cardiac Clinic Note    Chief Complaint/Reason for consultation:  Tachycardia      HPI:   Jannet Hayes is a 76 y.o. female with h/o vertigo, tremors, irritable bowel syndrome who was referred to cardiology clinic by PCP Dr. Aburto for evaluation.    6/15/2022  Reports being COVID about 1 month ago she believes, but did not get tested  Reports cough and SOB  Has had tachycardia, HR 140s with minimal exertion, taking a shower, putting on make up  Reports some SOB that started about 1 year ago, but worsening over the last few weeks, with minimal exertion. Palpitations with the SOB   Was having her symptoms prior to COVID  Reports bad GERD which can cause chest discomfort   Does not exercise regularly     Denies tobacco abuse. ETOH occ  Denies syncope,   Denies CVA, DM, HTN   Family hx: uncle- possible MI, Gf-  of MI at 78 yo possibly      22  Had shoulder pain when waking up this morning, not worse with movement   7 day cardiac monitor with ST with symptoms, avg HR normal   Echo with normal EF  ETT negative for ischemia   Had minimal exertion when had palpitations   Reports normal hydration, drinks a lot of tea     Denies chest pain, syncope       EKG 6/15/2022 NSR, no acute ST - T wave changes,  ms    ECHO  22  · The left ventricle is small with concentric remodeling and normal systolic function.  · The estimated ejection fraction is 60%.  · Normal left ventricular diastolic function.  · Normal right ventricular size with normal right ventricular systolic function.  · Mild tricuspid regurgitation.  · Normal central venous pressure (3 mmHg).  · The estimated PA systolic pressure is 23 mmHg.        STRESS TEST    [facility]      ROS: All 10 systems reviewed. Please refer to the HPI for pertinent positives. All other systems negative.     Past Medical History  Past Medical History:   Diagnosis Date    Amnesia, global, transient 2019    Chronic  "constipation     Concussion 2020    History of IBS 2016    Skin cancer     Tremors of nervous system 2020    Vertigo 2020       Surgical History  Past Surgical History:   Procedure Laterality Date    ADENOIDECTOMY      LUMBAR LAMINECTOMY WITH SURGICAL REMOVAL OF LESION OF SPINAL CORD BY POSTERIOR APPROACH  1999          Allergies:   Review of patient's allergies indicates:   Allergen Reactions    Oxycodone-acetaminophen Anaphylaxis     HALLUCINATIONS    Codeine Other (See Comments)     hallucinations    Erythromycin Other (See Comments)     "odd thinking patterns, black dots in front of eyes"...."mycins"    Penicillins Hives     ITCHING, SWELLING.  Patient states "can take Keflex without any problems".       Social History:  Social History     Socioeconomic History    Marital status:    Tobacco Use    Smoking status: Never Smoker    Smokeless tobacco: Never Used   Substance and Sexual Activity    Alcohol use: Yes     Comment: occas    Drug use: Never    Sexual activity: Not Currently       Family History:  family history includes Diabetes in her mother; Heart disease in her father; Lymphoma in her mother; Stroke in her mother.    Home Medications:  Current Outpatient Medications on File Prior to Visit   Medication Sig Dispense Refill    acetaminophen (TYLENOL) 500 MG tablet Take 500 mg by mouth as needed.      antiarthritic combination no.2 900 mg Tab Take 2 tablets by mouth once.      bimatoprost (LATISSE) 0.03 % ophthalmic solution Place one drop on applicator and apply evenly along the skin of the upper eyelid at base of eyelashes once daily at bedtime; repeat procedure for second eye (use a clean applicator). 5 mL 11    COD LIVER OIL ORAL Take 1,000 mg by mouth once daily at 6am.      estradioL (VIVELLE-DOT) 0.075 mg/24 hr Place onto the skin.      magnesium hydroxide 400 mg/5 ml (MILK OF MAGNESIA) 400 mg/5 mL Susp Take 30 mLs by mouth once daily.      multivit-iron-FA-calcium-mins " "(THERA-TABS M) 27 mg iron-400 mcg Tab Take 1 tablet by mouth once daily at 6am.      NON FORMULARY MEDICATION 1 tablet.      omega-3 fatty acids-fish oil 340-1,000 mg Cap Take 1,000 mg by mouth once daily at 6am.      PHOSPHATIDYL SERINE, BULK, MISC 300 mg by Misc.(Non-Drug; Combo Route) route once daily.      progesterone (PROMETRIUM) 100 MG capsule Take 100 mg by mouth nightly.      tannic/chestnut/peppermint (ATRANTIL ORAL) Take 550 mg by mouth once daily at 6am.      taurine 1,000 mg Cap Take 1,000 mg by mouth once daily.      turmeric root extract 1,053 mg Tab Take 1 tablet by mouth.      vitamin D (VITAMIN D3) 1000 units Tab Take by mouth.      lubiprostone (AMITIZA) 24 MCG Cap Take 1 capsule (24 mcg total) by mouth daily with breakfast. (Patient not taking: No sig reported) 30 capsule 2     No current facility-administered medications on file prior to visit.       Physical exam:  /80 (BP Location: Right arm, Patient Position: Sitting, BP Method: Medium (Manual))   Pulse 82   Ht 5' 4" (1.626 m)   Wt 66.9 kg (147 lb 7.8 oz)   SpO2 96%   BMI 25.32 kg/m²         General: Pt is a 76 y.o. year old female who is AAOx3, in NAD, is pleasant, well nourished, looks stated age  HEENT: PERRL, EOMI, Oral mucosa pink & moist  CVS  No abnormal cardiac pulsations noted on inspection. JVP not raised. The apical impulse is normal on palpation, and is located in the left 5th intercostal space in the mid - clavicular line. No palpable thrills or abnormal pulsations noted. RR, S1 - S2 heard, no murmurs, rubs or gallops appreciated.   PUL : CTA B/L. No wheezes/crackles heard   ABD : BS +, soft. No tenderness elicited   LE : No C/C/E. Distal Pulses palpable B/L         LABS:    Chemistry:   Lab Results   Component Value Date     03/21/2022    K 4.1 03/21/2022     03/21/2022    CO2 24 03/21/2022    BUN 16 03/21/2022    CREATININE 0.9 03/21/2022    CALCIUM 10.1 03/21/2022     Cardiac Markers: No results " "found for: CKTOTAL, CKMB, CKMBINDEX, TROPONINI  Cardiac Markers (Last 3): No results found for: CKTOTAL, CKMB, CKMBINDEX, TROPONINI  CBC:   Lab Results   Component Value Date    WBC 7.33 03/21/2022    HGB 14.1 03/21/2022    HCT 44.3 03/21/2022    MCV 96 03/21/2022     03/21/2022     Lipids:   Lab Results   Component Value Date    CHOL 218 (H) 03/21/2022    TRIG 132 03/21/2022    HDL 91 (H) 03/21/2022     Coagulation: No results found for: PT, INR, APTT        Assessment    1. Tachycardia    2. Vertigo    3. Irritable bowel syndrome, unspecified type    4. Gastroesophageal reflux disease, unspecified whether esophagitis present    5. SOB (shortness of breath)         Plan:  Tachycardia/shortness of breath- stable  7 day cardiac monitor with ST with symptoms, avg HR normal   Echo with normal EF  ETT negative for ischemia   Symptom are may be due to deconditioning (has been less active)  increase hydration      Vertigo  Stable  No current symptoms     IBS/GERD  Has been uncontrolled, has bad "gas"  Currently not on medication       This note was prepared using voice recognition system and is likely to have sound alike errors that may have been overlooked even after proofreading.     I have reviewed all pertinent chart information.  Plans and recommendations have been formulated under my direct supervision. All questions answered and patient voiced understanding.   If symptoms persist go to the ED.    RTC in 6 months         Itzel Mcdermott MD  Cardiology            "

## 2022-08-31 ENCOUNTER — PATIENT OUTREACH (OUTPATIENT)
Dept: ADMINISTRATIVE | Facility: HOSPITAL | Age: 77
End: 2022-08-31
Payer: MEDICARE

## 2022-08-31 NOTE — PROGRESS NOTES
Working the Non-Complaint Mammogram Report: Calling the patient to discuss scheduling a mammogram appointment Patient agreed to scheduled 09/30/22 at [facility]

## 2022-09-02 ENCOUNTER — TELEPHONE (OUTPATIENT)
Dept: INTERNAL MEDICINE | Facility: CLINIC | Age: 77
End: 2022-09-02
Payer: MEDICARE

## 2022-09-02 NOTE — TELEPHONE ENCOUNTER
Patient notified insurance will not cover at the clinic, only at the pharmacy.  Blocking provider from ordering.

## 2022-09-02 NOTE — TELEPHONE ENCOUNTER
Her insurance will not pay for the Tdap except at a pharmacy. It is blocking me from ordering it.

## 2022-09-02 NOTE — TELEPHONE ENCOUNTER
----- Message from Lorie Berry MA sent at 9/2/2022  9:47 AM CDT -----  Patient was last seen 6/13/22.  She is requesting a nurse visit for Tdap.  Will she need an appt to be seen with you first?  Please advise.  ----- Message -----  From: Marilyn Cadena MA  Sent: 9/1/2022   5:15 PM CDT  To: Criselda Claudio Staff    Type:  Patient Returning Call    Who Called:pt  Does the patient know what this is regarding?:yes  Would the patient rather a call back or a response via [facility]? Call back  Best Call Back Number: [phone]  Additional Information: pt would like a call back to schedule Tdap vaccine nurse appt

## 2022-09-28 ENCOUNTER — OFFICE VISIT (OUTPATIENT)
Dept: CARDIOLOGY | Facility: CLINIC | Age: 77
End: 2022-09-28
Payer: MEDICARE

## 2022-09-28 VITALS
SYSTOLIC BLOOD PRESSURE: 112 MMHG | HEIGHT: 64 IN | BODY MASS INDEX: 25.25 KG/M2 | OXYGEN SATURATION: 97 % | HEART RATE: 70 BPM | WEIGHT: 147.94 LBS | DIASTOLIC BLOOD PRESSURE: 80 MMHG

## 2022-09-28 DIAGNOSIS — K58.9 IRRITABLE BOWEL SYNDROME, UNSPECIFIED TYPE: ICD-10-CM

## 2022-09-28 DIAGNOSIS — K21.9 GASTROESOPHAGEAL REFLUX DISEASE, UNSPECIFIED WHETHER ESOPHAGITIS PRESENT: ICD-10-CM

## 2022-09-28 DIAGNOSIS — R00.0 TACHYCARDIA: ICD-10-CM

## 2022-09-28 DIAGNOSIS — R42 VERTIGO: ICD-10-CM

## 2022-09-28 DIAGNOSIS — Z01.810 PREOP CARDIOVASCULAR EXAM: Primary | ICD-10-CM

## 2022-09-28 PROCEDURE — 1126F AMNT PAIN NOTED NONE PRSNT: CPT | Mod: CPTII,S$GLB,, | Performed by: STUDENT IN AN ORGANIZED HEALTH CARE EDUCATION/TRAINING PROGRAM

## 2022-09-28 PROCEDURE — 1159F PR MEDICATION LIST DOCUMENTED IN MEDICAL RECORD: ICD-10-PCS | Mod: CPTII,S$GLB,, | Performed by: STUDENT IN AN ORGANIZED HEALTH CARE EDUCATION/TRAINING PROGRAM

## 2022-09-28 PROCEDURE — 99214 OFFICE O/P EST MOD 30 MIN: CPT | Mod: S$GLB,,, | Performed by: STUDENT IN AN ORGANIZED HEALTH CARE EDUCATION/TRAINING PROGRAM

## 2022-09-28 PROCEDURE — 1126F PR PAIN SEVERITY QUANTIFIED, NO PAIN PRESENT: ICD-10-PCS | Mod: CPTII,S$GLB,, | Performed by: STUDENT IN AN ORGANIZED HEALTH CARE EDUCATION/TRAINING PROGRAM

## 2022-09-28 PROCEDURE — 3044F HG A1C LEVEL LT 7.0%: CPT | Mod: CPTII,S$GLB,, | Performed by: STUDENT IN AN ORGANIZED HEALTH CARE EDUCATION/TRAINING PROGRAM

## 2022-09-28 PROCEDURE — 3079F DIAST BP 80-89 MM HG: CPT | Mod: CPTII,S$GLB,, | Performed by: STUDENT IN AN ORGANIZED HEALTH CARE EDUCATION/TRAINING PROGRAM

## 2022-09-28 PROCEDURE — 3079F PR MOST RECENT DIASTOLIC BLOOD PRESSURE 80-89 MM HG: ICD-10-PCS | Mod: CPTII,S$GLB,, | Performed by: STUDENT IN AN ORGANIZED HEALTH CARE EDUCATION/TRAINING PROGRAM

## 2022-09-28 PROCEDURE — 99214 PR OFFICE/OUTPT VISIT, EST, LEVL IV, 30-39 MIN: ICD-10-PCS | Mod: S$GLB,,, | Performed by: STUDENT IN AN ORGANIZED HEALTH CARE EDUCATION/TRAINING PROGRAM

## 2022-09-28 PROCEDURE — 99999 PR PBB SHADOW E&M-EST. PATIENT-LVL IV: CPT | Mod: PBBFAC,,, | Performed by: STUDENT IN AN ORGANIZED HEALTH CARE EDUCATION/TRAINING PROGRAM

## 2022-09-28 PROCEDURE — 3008F PR BODY MASS INDEX (BMI) DOCUMENTED: ICD-10-PCS | Mod: CPTII,S$GLB,, | Performed by: STUDENT IN AN ORGANIZED HEALTH CARE EDUCATION/TRAINING PROGRAM

## 2022-09-28 PROCEDURE — 99999 PR PBB SHADOW E&M-EST. PATIENT-LVL IV: ICD-10-PCS | Mod: PBBFAC,,, | Performed by: STUDENT IN AN ORGANIZED HEALTH CARE EDUCATION/TRAINING PROGRAM

## 2022-09-28 PROCEDURE — 3008F BODY MASS INDEX DOCD: CPT | Mod: CPTII,S$GLB,, | Performed by: STUDENT IN AN ORGANIZED HEALTH CARE EDUCATION/TRAINING PROGRAM

## 2022-09-28 PROCEDURE — 3074F PR MOST RECENT SYSTOLIC BLOOD PRESSURE < 130 MM HG: ICD-10-PCS | Mod: CPTII,S$GLB,, | Performed by: STUDENT IN AN ORGANIZED HEALTH CARE EDUCATION/TRAINING PROGRAM

## 2022-09-28 PROCEDURE — 1159F MED LIST DOCD IN RCRD: CPT | Mod: CPTII,S$GLB,, | Performed by: STUDENT IN AN ORGANIZED HEALTH CARE EDUCATION/TRAINING PROGRAM

## 2022-09-28 PROCEDURE — 3044F PR MOST RECENT HEMOGLOBIN A1C LEVEL <7.0%: ICD-10-PCS | Mod: CPTII,S$GLB,, | Performed by: STUDENT IN AN ORGANIZED HEALTH CARE EDUCATION/TRAINING PROGRAM

## 2022-09-28 PROCEDURE — 3074F SYST BP LT 130 MM HG: CPT | Mod: CPTII,S$GLB,, | Performed by: STUDENT IN AN ORGANIZED HEALTH CARE EDUCATION/TRAINING PROGRAM

## 2022-09-28 NOTE — PROGRESS NOTES
Section of Cardiology                  Cardiac Clinic Note    Chief Complaint/Reason for consultation:  Tachycardia      HPI:   Jannet Hayes is a 76 y.o. female with h/o vertigo, tremors, irritable bowel syndrome who was referred to cardiology clinic by PCP Dr. Aburto for evaluation.    6/15/2022  Reports being COVID about 1 month ago she believes, but did not get tested  Reports cough and SOB  Has had tachycardia, HR 140s with minimal exertion, taking a shower, putting on make up  Reports some SOB that started about 1 year ago, but worsening over the last few weeks, with minimal exertion. Palpitations with the SOB   Was having her symptoms prior to COVID  Reports bad GERD which can cause chest discomfort   Does not exercise regularly     Denies tobacco abuse. ETOH occ  Denies syncope,   Denies CVA, DM, HTN   Family hx: uncle- possible MI, Gf-  of MI at 80 yo possibly      22  Had shoulder pain when waking up this morning, not worse with movement   7 day cardiac monitor with ST with symptoms, avg HR normal   Echo with normal EF  ETT negative for ischemia   Had minimal exertion when had palpitations   Reports normal hydration, drinks a lot of tea     Denies chest pain, syncope       22  Has some palpitations at times, HR has not been as high  Has been active   Preop for D&C procedure with Obgyn  Gets dizzy at times, with lying down     Denies chest pain, syncope       EKG 6/15/2022 NSR, no acute ST - T wave changes,  ms    ECHO  22  The left ventricle is small with concentric remodeling and normal systolic function.  The estimated ejection fraction is 60%.  Normal left ventricular diastolic function.  Normal right ventricular size with normal right ventricular systolic function.  Mild tricuspid regurgitation.  Normal central venous pressure (3 mmHg).  The estimated PA systolic pressure is 23 mmHg.        STRESS TEST    [facility]      ROS: All 10 systems reviewed. Please refer  "to the [facility] for pertinent positives. All other systems negative.     Past Medical History  Past Medical History:   Diagnosis Date    Amnesia, global, transient 2019    Chronic constipation     Concussion 2020    History of IBS 2016    Skin cancer     Tremors of nervous system 2020    Vertigo 2020       Surgical History  Past Surgical History:   Procedure Laterality Date    ADENOIDECTOMY      LUMBAR LAMINECTOMY WITH SURGICAL REMOVAL OF LESION OF SPINAL CORD BY POSTERIOR APPROACH  1999          Allergies:   Review of patient's allergies indicates:   Allergen Reactions    Oxycodone-acetaminophen Anaphylaxis     HALLUCINATIONS    Codeine Other (See Comments)     hallucinations    Erythromycin Other (See Comments)     "odd thinking patterns, black dots in front of eyes"...."mycins"    Penicillins Hives     ITCHING, SWELLING.  Patient states "can take Keflex without any problems".       Social History:  Social History     Socioeconomic History    Marital status:    Tobacco Use    Smoking status: Never    Smokeless tobacco: Never   Substance and Sexual Activity    Alcohol use: Yes     Comment: occas    Drug use: Never    Sexual activity: Not Currently       Family History:  family history includes Diabetes in her mother; Heart disease in her father; Lymphoma in her mother; Stroke in her mother.    Home Medications:  Current Outpatient Medications on File Prior to Visit   Medication Sig Dispense Refill    acetaminophen (TYLENOL) 500 MG tablet Take 500 mg by mouth as needed.      antiarthritic combination no.2 900 mg Tab Take 2 tablets by mouth once.      bimatoprost (LATISSE) 0.03 % ophthalmic solution Place one drop on applicator and apply evenly along the skin of the upper eyelid at base of eyelashes once daily at bedtime; repeat procedure for second eye (use a clean applicator). 5 mL 11    COD LIVER OIL ORAL Take 1,000 mg by mouth once daily at 6am.      estradioL (VIVELLE-DOT) 0.075 mg/24 hr Place onto the skin.   " "   magnesium hydroxide 400 mg/5 ml (MILK OF MAGNESIA) 400 mg/5 mL Susp Take 30 mLs by mouth once daily.      multivit-iron-FA-calcium-mins (THERA-TABS M) 27 mg iron-400 mcg Tab Take 1 tablet by mouth once daily at 6am.      NON FORMULARY MEDICATION 1 tablet.      omega-3 fatty acids-fish oil 340-1,000 mg Cap Take 1,000 mg by mouth once daily at 6am.      PHOSPHATIDYL SERINE, BULK, MISC 300 mg by Misc.(Non-Drug; Combo Route) route once daily.      progesterone (PROMETRIUM) 100 MG capsule Take 100 mg by mouth nightly.      tannic/chestnut/peppermint (ATRANTIL ORAL) Take 550 mg by mouth once daily at 6am.      taurine 1,000 mg Cap Take 1,000 mg by mouth once daily.      turmeric root extract 1,053 mg Tab Take 1 tablet by mouth.      vitamin D (VITAMIN D3) 1000 units Tab Take by mouth.      lubiprostone (AMITIZA) 24 MCG Cap Take 1 capsule (24 mcg total) by mouth daily with breakfast. (Patient not taking: No sig reported) 30 capsule 2     No current facility-administered medications on file prior to visit.       Physical exam:  /80 (BP Location: Right arm, Patient Position: Sitting, BP Method: Medium (Manual))   Pulse 70   Ht 5' 4" (1.626 m)   Wt 67.1 kg (147 lb 14.9 oz)   SpO2 97%   BMI 25.39 kg/m²         General: Pt is a 76 y.o. year old female who is AAOx3, in NAD, is pleasant, well nourished, looks stated age  HEENT: PERRL, EOMI, Oral mucosa pink & moist  CVS  No abnormal cardiac pulsations noted on inspection. JVP not raised. The apical impulse is normal on palpation, and is located in the left 5th intercostal space in the mid - clavicular line. No palpable thrills or abnormal pulsations noted. RR, S1 - S2 heard, no murmurs, rubs or gallops appreciated.   PUL : CTA B/L. No wheezes/crackles heard   ABD : BS +, soft. No tenderness elicited   LE : No C/C/E. Distal Pulses palpable B/L         LABS:    Chemistry:   Lab Results   Component Value Date     03/21/2022    K 4.1 03/21/2022     " "03/21/2022    CO2 24 03/21/2022    BUN 16 03/21/2022    CREATININE 0.9 03/21/2022    CALCIUM 10.1 03/21/2022     Cardiac Markers: No results found for: CKTOTAL, CKMB, CKMBINDEX, TROPONINI  Cardiac Markers (Last 3): No results found for: CKTOTAL, CKMB, CKMBINDEX, TROPONINI  CBC:   Lab Results   Component Value Date    WBC 7.33 03/21/2022    HGB 14.1 03/21/2022    HCT 44.3 03/21/2022    MCV 96 03/21/2022     03/21/2022     Lipids:   Lab Results   Component Value Date    CHOL 218 (H) 03/21/2022    TRIG 132 03/21/2022    HDL 91 (H) 03/21/2022     Coagulation: No results found for: PT, INR, APTT        Assessment    1. Preop cardiovascular exam    2. Tachycardia    3. Vertigo    4. Irritable bowel syndrome, unspecified type    5. Gastroesophageal reflux disease, unspecified whether esophagitis present           Plan:      Preop risk stratification  >4METS  Stable cardiac symptoms  Low CV risk for D&C procedure    Tachycardia/shortness of breath- stable  7 day cardiac monitor with ST with symptoms, avg HR normal   Echo with normal EF  ETT negative for ischemia   Symptoms have improved with increased activity (more active with her dogs)  increase hydration    Vertigo  Stable  No current symptoms     IBS/GERD  Has been uncontrolled, has bad "gas"  Currently not on medication       This note was prepared using voice recognition system and is likely to have sound alike errors that may have been overlooked even after proofreading.     I have reviewed all pertinent chart information.  Plans and recommendations have been formulated under my direct supervision. All questions answered and patient voiced understanding.   If symptoms persist go to the ED.    RTC in 6 months         Itzel Mcdermott MD  Cardiology              "

## 2022-09-30 ENCOUNTER — HOSPITAL ENCOUNTER (OUTPATIENT)
Dept: RADIOLOGY | Facility: HOSPITAL | Age: 77
Discharge: HOME | End: 2022-09-30
Attending: FAMILY MEDICINE
Payer: MEDICARE

## 2022-09-30 DIAGNOSIS — Z12.31 ENCOUNTER FOR SCREENING MAMMOGRAM FOR MALIGNANT NEOPLASM OF BREAST: ICD-10-CM

## 2022-09-30 PROCEDURE — 77063 BREAST TOMOSYNTHESIS BI: CPT | Mod: 26,,, | Performed by: RADIOLOGY

## 2022-09-30 PROCEDURE — 77067 SCR MAMMO BI INCL CAD: CPT | Mod: 26,,, | Performed by: RADIOLOGY

## 2022-09-30 PROCEDURE — 77063 MAMMO DIGITAL SCREENING BILAT WITH TOMO: ICD-10-PCS | Mod: 26,,, | Performed by: RADIOLOGY

## 2022-09-30 PROCEDURE — 77067 SCR MAMMO BI INCL CAD: CPT | Mod: TC

## 2022-09-30 PROCEDURE — 77067 MAMMO DIGITAL SCREENING BILAT WITH TOMO: ICD-10-PCS | Mod: 26,,, | Performed by: RADIOLOGY

## 2022-10-05 ENCOUNTER — HOSPITAL ENCOUNTER (EMERGENCY)
Facility: HOSPITAL | Age: 77
Discharge: HOME | End: 2022-10-05
Attending: EMERGENCY MEDICINE
Payer: MEDICARE

## 2022-10-05 VITALS
HEIGHT: 64 IN | HEART RATE: 90 BPM | SYSTOLIC BLOOD PRESSURE: 184 MMHG | TEMPERATURE: 99 F | OXYGEN SATURATION: 96 % | RESPIRATION RATE: 20 BRPM | WEIGHT: 146.38 LBS | DIASTOLIC BLOOD PRESSURE: 80 MMHG | BODY MASS INDEX: 24.99 KG/M2

## 2022-10-05 DIAGNOSIS — R82.71 BACTERIURIA: ICD-10-CM

## 2022-10-05 DIAGNOSIS — G44.209 TENSION-TYPE HEADACHE, NOT INTRACTABLE, UNSPECIFIED CHRONICITY PATTERN: Primary | ICD-10-CM

## 2022-10-05 LAB
ALBUMIN SERPL BCP-MCNC: 4.2 G/DL (ref 3.5–5.2)
ALP SERPL-CCNC: 51 U/L (ref 55–135)
ALT SERPL W/O P-5'-P-CCNC: 19 U/L (ref 10–44)
ANION GAP SERPL CALC-SCNC: 12 MMOL/L (ref 8–16)
AST SERPL-CCNC: 21 U/L (ref 10–40)
BACTERIA #/AREA URNS HPF: ABNORMAL /HPF
BASOPHILS # BLD AUTO: 0.1 K/UL (ref 0–0.2)
BASOPHILS NFR BLD: 1.2 % (ref 0–1.9)
BILIRUB SERPL-MCNC: 0.5 MG/DL (ref 0.1–1)
BILIRUB UR QL STRIP: NEGATIVE
BUN SERPL-MCNC: 15 MG/DL (ref 8–23)
CALCIUM SERPL-MCNC: 9.7 MG/DL (ref 8.7–10.5)
CHLORIDE SERPL-SCNC: 105 MMOL/L (ref 95–110)
CLARITY UR: ABNORMAL
CO2 SERPL-SCNC: 23 MMOL/L (ref 23–29)
COLOR UR: YELLOW
CREAT SERPL-MCNC: 0.9 MG/DL (ref 0.5–1.4)
DIFFERENTIAL METHOD: NORMAL
EOSINOPHIL # BLD AUTO: 0.2 K/UL (ref 0–0.5)
EOSINOPHIL NFR BLD: 1.8 % (ref 0–8)
ERYTHROCYTE [DISTWIDTH] IN BLOOD BY AUTOMATED COUNT: 13.3 % (ref 11.5–14.5)
EST. GFR  (NO RACE VARIABLE): >60 ML/MIN/1.73 M^2
GLUCOSE SERPL-MCNC: 95 MG/DL (ref 70–110)
GLUCOSE UR QL STRIP: NEGATIVE
HCT VFR BLD AUTO: 45.2 % (ref 37–48.5)
HGB BLD-MCNC: 15.2 G/DL (ref 12–16)
HGB UR QL STRIP: ABNORMAL
HYALINE CASTS #/AREA URNS LPF: 0 /LPF
IMM GRANULOCYTES # BLD AUTO: 0.03 K/UL (ref 0–0.04)
IMM GRANULOCYTES NFR BLD AUTO: 0.4 % (ref 0–0.5)
KETONES UR QL STRIP: ABNORMAL
LEUKOCYTE ESTERASE UR QL STRIP: ABNORMAL
LYMPHOCYTES # BLD AUTO: 2.1 K/UL (ref 1–4.8)
LYMPHOCYTES NFR BLD: 24.8 % (ref 18–48)
MCH RBC QN AUTO: 30.6 PG (ref 27–31)
MCHC RBC AUTO-ENTMCNC: 33.6 G/DL (ref 32–36)
MCV RBC AUTO: 91 FL (ref 82–98)
MICROSCOPIC COMMENT: ABNORMAL
MONOCYTES # BLD AUTO: 0.8 K/UL (ref 0.3–1)
MONOCYTES NFR BLD: 9.7 % (ref 4–15)
NEUTROPHILS # BLD AUTO: 5.1 K/UL (ref 1.8–7.7)
NEUTROPHILS NFR BLD: 62.1 % (ref 38–73)
NITRITE UR QL STRIP: NEGATIVE
NRBC BLD-RTO: 0 /100 WBC
PH UR STRIP: 6 [PH] (ref 5–8)
PLATELET # BLD AUTO: 370 K/UL (ref 150–450)
PMV BLD AUTO: 9.7 FL (ref 9.2–12.9)
POTASSIUM SERPL-SCNC: 4 MMOL/L (ref 3.5–5.1)
PROT SERPL-MCNC: 7.3 G/DL (ref 6–8.4)
PROT UR QL STRIP: ABNORMAL
RBC # BLD AUTO: 4.97 M/UL (ref 4–5.4)
RBC #/AREA URNS HPF: 38 /HPF (ref 0–4)
SODIUM SERPL-SCNC: 140 MMOL/L (ref 136–145)
SP GR UR STRIP: >1.03 (ref 1–1.03)
SQUAMOUS #/AREA URNS HPF: 27 /HPF
URN SPEC COLLECT METH UR: ABNORMAL
UROBILINOGEN UR STRIP-ACNC: NEGATIVE EU/DL
WBC # BLD AUTO: 8.27 K/UL (ref 3.9–12.7)
WBC #/AREA URNS HPF: 7 /HPF (ref 0–5)
WBC CLUMPS URNS QL MICRO: ABNORMAL

## 2022-10-05 PROCEDURE — 80053 COMPREHEN METABOLIC PANEL: CPT | Performed by: NURSE PRACTITIONER

## 2022-10-05 PROCEDURE — 99284 EMERGENCY DEPT VISIT MOD MDM: CPT | Mod: 25

## 2022-10-05 PROCEDURE — 85025 COMPLETE CBC W/AUTO DIFF WBC: CPT | Performed by: NURSE PRACTITIONER

## 2022-10-05 PROCEDURE — 81000 URINALYSIS NONAUTO W/SCOPE: CPT | Performed by: NURSE PRACTITIONER

## 2022-10-05 RX ORDER — NITROFURANTOIN 25; 75 MG/1; MG/1
100 CAPSULE ORAL 2 TIMES DAILY
Qty: 10 CAPSULE | Refills: 0 | Status: SHIPPED | OUTPATIENT
Start: 2022-10-05 | End: 2022-10-10

## 2022-10-05 NOTE — FIRST PROVIDER EVALUATION
"Medical screening examination initiated.  I have conducted a focused provider triage encounter, findings are as follows:    Brief history of present illness:  Patient states around 330 this morning she woke up with a headache and took Tylenol and Aleve which relieved it but she is having problems thinking.    Vitals:    10/05/22 1128   Weight: 66.4 kg (146 lb 6.2 oz)   Height: 5' 4" (1.626 m)       Pertinent physical exam:  Fast negative    Brief workup plan:  CT head lab work    Preliminary workup initiated; this workup will be continued and followed by the physician or advanced practice provider that is assigned to the patient when roomed.  "

## 2022-10-05 NOTE — ED PROVIDER NOTES
"Encounter Date: 10/5/2022       History     Chief Complaint   Patient presents with    Headache     Headache, nausea, and "trouble thinking" since last night.      76-year-old female with complaint of headache this morning at 3 a.m..  Patient reports she woke up and she was mildly confused.  Patient denies headache at present.  No fever or chills.  No neck pain.  Patient reports no confusion at present      Review of patient's allergies indicates:   Allergen Reactions    Oxycodone-acetaminophen Anaphylaxis     HALLUCINATIONS    Codeine Other (See Comments)     hallucinations    Erythromycin Other (See Comments)     "odd thinking patterns, black dots in front of eyes"...."mycins"    Penicillins Hives     ITCHING, SWELLING.  Patient states "can take Keflex without any problems".  Other reaction(s): hives, swelling      Past Medical History:   Diagnosis Date    Amnesia, global, transient 2019    Chronic constipation     Concussion 2020    History of IBS 2016    Skin cancer     Tremors of nervous system 2020    Vertigo 2020     Past Surgical History:   Procedure Laterality Date    ADENOIDECTOMY      AUGMENTATION OF BREAST  2008    silicone    LUMBAR LAMINECTOMY WITH SURGICAL REMOVAL OF LESION OF SPINAL CORD BY POSTERIOR APPROACH  1999     Family History   Problem Relation Age of Onset    Diabetes Mother     Stroke Mother     Lymphoma Mother     Heart disease Father      Social History     Tobacco Use    Smoking status: Never    Smokeless tobacco: Never   Substance Use Topics    Alcohol use: Yes     Comment: occas    Drug use: Never     Review of Systems   Constitutional:  Negative for fever.   HENT:  Negative for sore throat.    Respiratory:  Negative for shortness of breath.    Cardiovascular:  Negative for chest pain.   Gastrointestinal:  Negative for nausea.   Genitourinary:  Negative for dysuria.   Musculoskeletal:  Negative for back pain.   Skin:  Negative for rash.   Neurological:  Positive for headaches. Negative " for weakness.   Hematological:  Does not bruise/bleed easily.     Physical Exam     Initial Vitals [10/05/22 1129]   BP Pulse Resp Temp SpO2   (!) 184/80 90 20 98.6 °F (37 °C) 96 %      MAP       --         Physical Exam    Nursing note and vitals reviewed.  Constitutional: She appears well-developed and well-nourished.   HENT:   Head: Normocephalic and atraumatic.   Eyes: Conjunctivae and EOM are normal. Pupils are equal, round, and reactive to light.   Neck: Neck supple.   Normal range of motion.  Cardiovascular:  Normal rate, regular rhythm, normal heart sounds and intact distal pulses.           Pulmonary/Chest: Breath sounds normal.   Abdominal: Abdomen is soft. There is no abdominal tenderness. There is no rebound and no guarding.   Musculoskeletal:         General: Normal range of motion.      Cervical back: Normal range of motion and neck supple.     Neurological: She is alert and oriented to person, place, and time. She has normal strength and normal reflexes.   Skin: Skin is warm and dry.   Psychiatric: She has a normal mood and affect. Her behavior is normal. Thought content normal.       ED Course   Procedures  Labs Reviewed   COMPREHENSIVE METABOLIC PANEL - Abnormal; Notable for the following components:       Result Value    Alkaline Phosphatase 51 (*)     All other components within normal limits   URINALYSIS, REFLEX TO URINE CULTURE - Abnormal; Notable for the following components:    Appearance, UA Hazy (*)     Specific Gravity, UA >1.030 (*)     Protein, UA 1+ (*)     Ketones, UA Trace (*)     Occult Blood UA 3+ (*)     Leukocytes, UA Trace (*)     All other components within normal limits    Narrative:     Specimen Source->Urine   URINALYSIS MICROSCOPIC - Abnormal; Notable for the following components:    RBC, UA 38 (*)     WBC, UA 7 (*)     WBC Clumps, UA Few (*)     All other components within normal limits    Narrative:     Specimen Source->Urine   CBC W/ AUTO DIFFERENTIAL          Imaging  Results              CT Head Without Contrast (Final result)  Result time 10/05/22 12:33:06      Final result by Chris Joya MD (10/05/22 12:33:06)                   Impression:      Chronic microvascular ischemic changes.    All CT scans at this facility use dose modulation, iterative reconstruction, and/or weight based dosing when appropriate to reduce radiation dose to as low as reasonable achievable.      Electronically signed by: Chris Joya MD  Date:    10/05/2022  Time:    12:33               Narrative:    EXAMINATION:  CT HEAD WITHOUT CONTRAST    CLINICAL HISTORY:  Mental status change, unknown cause;    TECHNIQUE:  Low dose axial CT images obtained throughout the head without intravenous contrast. Sagittal and coronal reconstructions were performed.    COMPARISON:  None.    FINDINGS:  Intracranial compartment:    The brain parenchyma demonstrates areas of decreased attenuation with mild to moderate periventricular white matter consistent with chronic microvascular ischemic changes.  Suspect remote infarct right basal ganglia..  No parenchymal mass, hemorrhage, edema or major vascular distribution infarct.  Vascular calcifications are noted.    Mild prominence of the sulci and ventricles are consistent with age-related involutional changes.    No extra-axial blood or fluid collections.    Skull/extracranial contents (limited evaluation): No fracture.  Suspect trace fluid within the right maxillary sinus.  Mastoid air cells and paranasal sinuses are essentially clear.                                       Medications - No data to display      1:37 PM  Discussed CT findings with pt and sig other.  Pt had aura last night with seeing spots followed by headache at 3 am.  Pt reports history of migraine with aura.  Pt has no focal deficits.  No headache.  No neuro deficits.  CT reveals chronic microvascular ischemic changes.  Discussed outpatient plan and pt and sig other in agreement with plan.                       Clinical Impression:   Final diagnoses:  [G44.209] Tension-type headache, not intractable, unspecified chronicity pattern (Primary)  [R82.71] Bacteriuria      ED Disposition Condition    Discharge Stable          ED Prescriptions       Medication Sig Dispense Start Date End Date Auth. Provider    nitrofurantoin, macrocrystal-monohydrate, (MACROBID) 100 MG capsule Take 1 capsule (100 mg total) by mouth 2 (two) times daily. for 5 days 10 capsule 10/5/2022 10/10/2022 Mainor Luo NP          Follow-up Information       Follow up With Specialties Details Why Contact Info    González Aburto MD Internal Medicine Schedule an appointment as soon as possible for a visit in 2 days  [address] [facility] [address]  [phone]               Mainor Luo NP  10/05/22 9227

## 2022-11-10 ENCOUNTER — OFFICE VISIT (OUTPATIENT)
Dept: URGENT CARE | Facility: CLINIC | Age: 77
End: 2022-11-10
Payer: MEDICARE

## 2022-11-10 VITALS
OXYGEN SATURATION: 98 % | WEIGHT: 146.38 LBS | SYSTOLIC BLOOD PRESSURE: 131 MMHG | DIASTOLIC BLOOD PRESSURE: 60 MMHG | HEART RATE: 70 BPM | HEIGHT: 64 IN | RESPIRATION RATE: 14 BRPM | TEMPERATURE: 97 F | BODY MASS INDEX: 24.99 KG/M2

## 2022-11-10 DIAGNOSIS — S61.412A LACERATION OF LEFT HAND WITHOUT FOREIGN BODY, INITIAL ENCOUNTER: Primary | ICD-10-CM

## 2022-11-10 PROCEDURE — 3008F PR BODY MASS INDEX (BMI) DOCUMENTED: ICD-10-PCS | Mod: CPTII,S$GLB,,

## 2022-11-10 PROCEDURE — 1159F MED LIST DOCD IN RCRD: CPT | Mod: CPTII,S$GLB,,

## 2022-11-10 PROCEDURE — 3078F DIAST BP <80 MM HG: CPT | Mod: CPTII,S$GLB,,

## 2022-11-10 PROCEDURE — 99213 PR OFFICE/OUTPT VISIT, EST, LEVL III, 20-29 MIN: ICD-10-PCS | Mod: 25,S$GLB,,

## 2022-11-10 PROCEDURE — 3044F PR MOST RECENT HEMOGLOBIN A1C LEVEL <7.0%: ICD-10-PCS | Mod: CPTII,S$GLB,,

## 2022-11-10 PROCEDURE — 99213 OFFICE O/P EST LOW 20 MIN: CPT | Mod: 25,S$GLB,,

## 2022-11-10 PROCEDURE — 1125F PR PAIN SEVERITY QUANTIFIED, PAIN PRESENT: ICD-10-PCS | Mod: CPTII,S$GLB,,

## 2022-11-10 PROCEDURE — 12041 LACERATION REPAIR: ICD-10-PCS | Mod: S$GLB,,,

## 2022-11-10 PROCEDURE — 12041 INTMD RPR N-HF/GENIT 2.5CM/<: CPT | Mod: S$GLB,,,

## 2022-11-10 PROCEDURE — 1159F PR MEDICATION LIST DOCUMENTED IN MEDICAL RECORD: ICD-10-PCS | Mod: CPTII,S$GLB,,

## 2022-11-10 PROCEDURE — 1160F PR REVIEW ALL MEDS BY PRESCRIBER/CLIN PHARMACIST DOCUMENTED: ICD-10-PCS | Mod: CPTII,S$GLB,,

## 2022-11-10 PROCEDURE — 3075F PR MOST RECENT SYSTOLIC BLOOD PRESS GE 130-139MM HG: ICD-10-PCS | Mod: CPTII,S$GLB,,

## 2022-11-10 PROCEDURE — 3044F HG A1C LEVEL LT 7.0%: CPT | Mod: CPTII,S$GLB,,

## 2022-11-10 PROCEDURE — 3075F SYST BP GE 130 - 139MM HG: CPT | Mod: CPTII,S$GLB,,

## 2022-11-10 PROCEDURE — 3008F BODY MASS INDEX DOCD: CPT | Mod: CPTII,S$GLB,,

## 2022-11-10 PROCEDURE — 1160F RVW MEDS BY RX/DR IN RCRD: CPT | Mod: CPTII,S$GLB,,

## 2022-11-10 PROCEDURE — 3078F PR MOST RECENT DIASTOLIC BLOOD PRESSURE < 80 MM HG: ICD-10-PCS | Mod: CPTII,S$GLB,,

## 2022-11-10 PROCEDURE — 1125F AMNT PAIN NOTED PAIN PRSNT: CPT | Mod: CPTII,S$GLB,,

## 2022-11-10 RX ORDER — MUPIROCIN 20 MG/G
OINTMENT TOPICAL 3 TIMES DAILY
Qty: 30 G | Refills: 1 | Status: SHIPPED | OUTPATIENT
Start: 2022-11-10 | End: 2022-11-17

## 2022-11-10 NOTE — PATIENT INSTRUCTIONS
Laceration Repair With Tape  If your condition worsens or fails to improve we recommend that you receive another evaluation at the ER immediately or contact your PCP to discuss your concerns or return here. You must understand that you've received an urgent care treatment only and that you may be released before all your medical problems are known or treated. You the patient will arrange for followup care as instructed.     A laceration is a cut through the skin. You have a laceration that has been closed with skin tape. This is used on cuts that have smooth edges and are not infected.   You may need a tetanus shot. This is given if you have no record of a shot, and the object that caused the cut may lead to tetanus.    Skin tape was used today, please do not try to remove. Your tape will fall off on its own in about 5-7 days.  Avoid getting the affected area wet for 24-48 hours.  You may apply topical antibiotic ointment to help prevent infection but this will cause the skin tape to come off sooner than expected. You may reapply tape as needed for the next 7 days.  Do not scratch, rub, or pick at the tape  Do not apply liquids (such as peroxide), ointments, or creams to the wound while the skin tape is in place.  Avoid activities that may reinjure your wound.  Avoid activities that cause heavy sweating. Protect the wound from sunlight.    IF YOU WERE PRESCRIBED AN ANTIBIOTIC: This is to help prevent infection. Follow all instructions for taking this medicine. Take the medicine every day until it is gone or you are told to stop. You should not have any left over.    Monitor for signs of infection such as redness, drainage, increase swelling or increase pain.   Call your health care provider right away if any of these occur:  Wound bleeding not controlled by direct pressure  Signs of infection, including increasing pain in the wound, increasing wound redness or swelling, or pus or bad odor coming from the  wound  Fever of 100.4°F (38.ºC) or higher or as directed by your healthcare provider  Wound edges re-open  Wound changes colors  Numbness around the wound   Decreased movement around the injured area    If you were given narcotics for pain do not drive or operate heavy equipment or machinery.     Tylenol or ibuprofen can also be used as directed for pain unless you have an allergy to them or medical condition such as stomach ulcers, kidney or liver disease or blood thinners etc for which you should not be taking these type of medications.

## 2022-11-10 NOTE — PROGRESS NOTES
"Subjective:       Patient ID: Jannet Hayes is a 76 y.o. female.    Vitals:  height is 5' 4" (1.626 m) and weight is 66.4 kg (146 lb 6.2 oz). Her temperature is 97.2 °F (36.2 °C). Her blood pressure is 131/60 and her pulse is 70. Her respiration is 14 and oxygen saturation is 98%.     Chief Complaint: Hand Injury    Patient presents with abrasion to left hand after getting her hand caught between metal edge of her cough and her dog. Abrasion is roughly 3 inches long. Not deep. States they tried to clean it out but her skin was stuck so they came here for further evaluation and wound care. She is UTD on tetanus    Hand Injury   Her dominant hand is their left hand. The incident occurred 1 to 3 hours ago. The incident occurred at home. The injury mechanism was a direct blow. The pain is present in the left hand. The quality of the pain is described as burning. The pain does not radiate. The pain is at a severity of 4/10. The pain is mild. The pain has been Constant since the incident. Pertinent negatives include no chest pain, muscle weakness, numbness or tingling. The symptoms are aggravated by movement. She has tried nothing for the symptoms. The treatment provided no relief.     Cardiovascular:  Negative for chest pain.   Neurological:  Negative for numbness.     Objective:      Physical Exam   Constitutional: She is oriented to person, place, and time. She appears well-developed. She is cooperative.  Non-toxic appearance. She does not appear ill. No distress.   HENT:   Head: Normocephalic and atraumatic.   Ears:   Right Ear: Hearing and external ear normal.   Left Ear: Hearing and external ear normal.   Eyes: Lids are normal. Right eye exhibits no discharge. Left eye exhibits no discharge. No scleral icterus.   Neck: Trachea normal and phonation normal. Neck supple.   Cardiovascular: Normal rate and normal pulses.   Pulmonary/Chest: Effort normal. No respiratory distress.   Abdominal: Normal appearance. "   Musculoskeletal: Normal range of motion.         General: No deformity. Normal range of motion.   Neurological: She is alert and oriented to person, place, and time. She exhibits normal muscle tone. Coordination normal.   Skin: Skin is warm, dry, intact, not diaphoretic and not pale.         Comments: Laceration/skin tear to top of left hand and small laceration over index finger. Discussed sutures vs steri strips with patient. Patient opted against sutures  No signs of infection  No FB   Psychiatric: Her speech is normal and behavior is normal. Judgment and thought content normal.   Nursing note and vitals reviewed.            Assessment:       1. Laceration of left hand without foreign body, initial encounter          Plan:         Wound closed with steri-strips. Discussed use of antibiotic ointment although this will likely interfere with steri-strip adherence. Patient given additional steri-strips to reapply at home if needed for the next week. Proper wound care discussed as well as signs of infection. Return to clinic if symptoms worsen, persisit, or change.       Laceration of left hand without foreign body, initial encounter  -     mupirocin (BACTROBAN) 2 % ointment; Apply topically 3 (three) times daily. for 7 days  Dispense: 30 g; Refill: 1  -     Laceration Repair

## 2023-01-06 ENCOUNTER — OFFICE VISIT (OUTPATIENT)
Dept: INTERNAL MEDICINE | Facility: CLINIC | Age: 78
End: 2023-01-06
Payer: MEDICARE

## 2023-01-06 VITALS
TEMPERATURE: 97 F | WEIGHT: 148 LBS | HEART RATE: 80 BPM | OXYGEN SATURATION: 98 % | DIASTOLIC BLOOD PRESSURE: 86 MMHG | BODY MASS INDEX: 25.27 KG/M2 | HEIGHT: 64 IN | SYSTOLIC BLOOD PRESSURE: 118 MMHG

## 2023-01-06 DIAGNOSIS — M54.50 LUMBAR BACK PAIN: Primary | ICD-10-CM

## 2023-01-06 PROCEDURE — 3288F FALL RISK ASSESSMENT DOCD: CPT | Mod: CPTII,S$GLB,, | Performed by: INTERNAL MEDICINE

## 2023-01-06 PROCEDURE — 3074F SYST BP LT 130 MM HG: CPT | Mod: CPTII,S$GLB,, | Performed by: INTERNAL MEDICINE

## 2023-01-06 PROCEDURE — 99999 PR PBB SHADOW E&M-EST. PATIENT-LVL IV: ICD-10-PCS | Mod: PBBFAC,,, | Performed by: INTERNAL MEDICINE

## 2023-01-06 PROCEDURE — 1159F MED LIST DOCD IN RCRD: CPT | Mod: CPTII,S$GLB,, | Performed by: INTERNAL MEDICINE

## 2023-01-06 PROCEDURE — 99999 PR PBB SHADOW E&M-EST. PATIENT-LVL IV: CPT | Mod: PBBFAC,,, | Performed by: INTERNAL MEDICINE

## 2023-01-06 PROCEDURE — 3074F PR MOST RECENT SYSTOLIC BLOOD PRESSURE < 130 MM HG: ICD-10-PCS | Mod: CPTII,S$GLB,, | Performed by: INTERNAL MEDICINE

## 2023-01-06 PROCEDURE — 1159F PR MEDICATION LIST DOCUMENTED IN MEDICAL RECORD: ICD-10-PCS | Mod: CPTII,S$GLB,, | Performed by: INTERNAL MEDICINE

## 2023-01-06 PROCEDURE — 99213 PR OFFICE/OUTPT VISIT, EST, LEVL III, 20-29 MIN: ICD-10-PCS | Mod: S$GLB,,, | Performed by: INTERNAL MEDICINE

## 2023-01-06 PROCEDURE — 3288F PR FALLS RISK ASSESSMENT DOCUMENTED: ICD-10-PCS | Mod: CPTII,S$GLB,, | Performed by: INTERNAL MEDICINE

## 2023-01-06 PROCEDURE — 99213 OFFICE O/P EST LOW 20 MIN: CPT | Mod: S$GLB,,, | Performed by: INTERNAL MEDICINE

## 2023-01-06 PROCEDURE — 3079F PR MOST RECENT DIASTOLIC BLOOD PRESSURE 80-89 MM HG: ICD-10-PCS | Mod: CPTII,S$GLB,, | Performed by: INTERNAL MEDICINE

## 2023-01-06 PROCEDURE — 1101F PR PT FALLS ASSESS DOC 0-1 FALLS W/OUT INJ PAST YR: ICD-10-PCS | Mod: CPTII,S$GLB,, | Performed by: INTERNAL MEDICINE

## 2023-01-06 PROCEDURE — 1101F PT FALLS ASSESS-DOCD LE1/YR: CPT | Mod: CPTII,S$GLB,, | Performed by: INTERNAL MEDICINE

## 2023-01-06 PROCEDURE — 1125F PR PAIN SEVERITY QUANTIFIED, PAIN PRESENT: ICD-10-PCS | Mod: CPTII,S$GLB,, | Performed by: INTERNAL MEDICINE

## 2023-01-06 PROCEDURE — 1125F AMNT PAIN NOTED PAIN PRSNT: CPT | Mod: CPTII,S$GLB,, | Performed by: INTERNAL MEDICINE

## 2023-01-06 PROCEDURE — 3079F DIAST BP 80-89 MM HG: CPT | Mod: CPTII,S$GLB,, | Performed by: INTERNAL MEDICINE

## 2023-01-06 RX ORDER — TIZANIDINE 4 MG/1
4 TABLET ORAL EVERY 8 HOURS
Qty: 60 TABLET | Refills: 1 | Status: SHIPPED | OUTPATIENT
Start: 2023-01-06 | End: 2023-01-16

## 2023-01-06 RX ORDER — MELOXICAM 15 MG/1
15 TABLET ORAL DAILY
Qty: 30 TABLET | Refills: 1 | Status: SHIPPED | OUTPATIENT
Start: 2023-01-06 | End: 2023-11-02

## 2023-01-06 RX ORDER — MELOXICAM 15 MG/1
TABLET ORAL
Qty: 90 TABLET | OUTPATIENT
Start: 2023-01-06

## 2023-01-06 RX ORDER — TRAMADOL HYDROCHLORIDE 50 MG/1
50 TABLET ORAL EVERY 8 HOURS PRN
Qty: 20 TABLET | Refills: 0 | Status: SHIPPED | OUTPATIENT
Start: 2023-01-06 | End: 2023-11-02

## 2023-01-06 NOTE — PROGRESS NOTES
"HPI:  Pt comes with c/o left sided lower, lumbar back pain for 2 weeks. No injury. No pain in buttocks or legs.     Current meds have been verified and updated per the EMR  Exam:/86   Pulse 80   Temp 97.4 °F (36.3 °C) (Temporal)   Ht 5' 4" (1.626 m)   Wt 67.1 kg (148 lb)   SpO2 98%   BMI 25.40 kg/m²   No vertebral tenderness  Pain located lateral left lumbar region on palpation.  Neg SLR    Lab Results   Component Value Date    WBC 8.27 10/05/2022    HGB 15.2 10/05/2022    HCT 45.2 10/05/2022     10/05/2022    CHOL 218 (H) 03/21/2022    TRIG 132 03/21/2022    HDL 91 (H) 03/21/2022    ALT 19 10/05/2022    AST 21 10/05/2022     10/05/2022    K 4.0 10/05/2022     10/05/2022    CREATININE 0.9 10/05/2022    BUN 15 10/05/2022    CO2 23 10/05/2022    TSH 2.797 03/21/2022    HGBA1C 5.0 03/21/2022       Impression:  Muscular lower lumbar back pain  There is no problem list on file for this patient.      Plan:  Orders Placed This Encounter    meloxicam (MOBIC) 15 MG tablet    tiZANidine (ZANAFLEX) 4 MG tablet    traMADoL (ULTRAM) 50 mg tablet   Given above meds, taught how to do flex/ext exercises for her back. May also use tylenol.       This note is generated with speech recognition software and is subject to transcription error and sound alike phrases that may be missed by proofreading.      "

## 2023-01-06 NOTE — TELEPHONE ENCOUNTER
----- Message from Manasa Hamlin sent at 1/6/2023  9:09 AM CST -----  Contact: Jannet  .Type:  Sooner Apoointment Request    Caller is requesting a sooner appointment.  Caller declined first available appointment listed below.  Caller will not accept being placed on the waitlist and is requesting a message be sent to doctor.  Name of Caller:Jannet   When is the first available appointment?01/09/2023  Symptoms:gonzalez lower back pain(requesting X-ray)  Would the patient rather a call back or a response via [facility]? call  Best Call Back Number:.[phone]   Additional Information:   Thanks  LR

## 2023-02-09 ENCOUNTER — OFFICE VISIT (OUTPATIENT)
Dept: INTERNAL MEDICINE | Facility: CLINIC | Age: 78
End: 2023-02-09
Payer: MEDICARE

## 2023-02-09 VITALS
HEART RATE: 106 BPM | SYSTOLIC BLOOD PRESSURE: 136 MMHG | HEIGHT: 64 IN | TEMPERATURE: 98 F | BODY MASS INDEX: 24.07 KG/M2 | DIASTOLIC BLOOD PRESSURE: 88 MMHG | WEIGHT: 141 LBS | OXYGEN SATURATION: 96 %

## 2023-02-09 DIAGNOSIS — K59.09 CHRONIC CONSTIPATION: ICD-10-CM

## 2023-02-09 DIAGNOSIS — R10.33 PERIUMBILICAL ABDOMINAL PAIN: Primary | ICD-10-CM

## 2023-02-09 PROCEDURE — 3079F PR MOST RECENT DIASTOLIC BLOOD PRESSURE 80-89 MM HG: ICD-10-PCS | Mod: CPTII,S$GLB,, | Performed by: FAMILY MEDICINE

## 2023-02-09 PROCEDURE — 99214 PR OFFICE/OUTPT VISIT, EST, LEVL IV, 30-39 MIN: ICD-10-PCS | Mod: S$GLB,,, | Performed by: FAMILY MEDICINE

## 2023-02-09 PROCEDURE — 1101F PR PT FALLS ASSESS DOC 0-1 FALLS W/OUT INJ PAST YR: ICD-10-PCS | Mod: CPTII,S$GLB,, | Performed by: FAMILY MEDICINE

## 2023-02-09 PROCEDURE — 1159F MED LIST DOCD IN RCRD: CPT | Mod: CPTII,S$GLB,, | Performed by: FAMILY MEDICINE

## 2023-02-09 PROCEDURE — 99214 OFFICE O/P EST MOD 30 MIN: CPT | Mod: S$GLB,,, | Performed by: FAMILY MEDICINE

## 2023-02-09 PROCEDURE — 3075F PR MOST RECENT SYSTOLIC BLOOD PRESS GE 130-139MM HG: ICD-10-PCS | Mod: CPTII,S$GLB,, | Performed by: FAMILY MEDICINE

## 2023-02-09 PROCEDURE — 3288F FALL RISK ASSESSMENT DOCD: CPT | Mod: CPTII,S$GLB,, | Performed by: FAMILY MEDICINE

## 2023-02-09 PROCEDURE — 3075F SYST BP GE 130 - 139MM HG: CPT | Mod: CPTII,S$GLB,, | Performed by: FAMILY MEDICINE

## 2023-02-09 PROCEDURE — 99999 PR PBB SHADOW E&M-EST. PATIENT-LVL IV: ICD-10-PCS | Mod: PBBFAC,,, | Performed by: FAMILY MEDICINE

## 2023-02-09 PROCEDURE — 3288F PR FALLS RISK ASSESSMENT DOCUMENTED: ICD-10-PCS | Mod: CPTII,S$GLB,, | Performed by: FAMILY MEDICINE

## 2023-02-09 PROCEDURE — 3079F DIAST BP 80-89 MM HG: CPT | Mod: CPTII,S$GLB,, | Performed by: FAMILY MEDICINE

## 2023-02-09 PROCEDURE — 1101F PT FALLS ASSESS-DOCD LE1/YR: CPT | Mod: CPTII,S$GLB,, | Performed by: FAMILY MEDICINE

## 2023-02-09 PROCEDURE — 1159F PR MEDICATION LIST DOCUMENTED IN MEDICAL RECORD: ICD-10-PCS | Mod: CPTII,S$GLB,, | Performed by: FAMILY MEDICINE

## 2023-02-09 PROCEDURE — 99999 PR PBB SHADOW E&M-EST. PATIENT-LVL IV: CPT | Mod: PBBFAC,,, | Performed by: FAMILY MEDICINE

## 2023-02-09 RX ORDER — SUCRALFATE 1 G/1
1 TABLET ORAL
Qty: 40 TABLET | Refills: 0 | Status: SHIPPED | OUTPATIENT
Start: 2023-02-09 | End: 2023-11-02

## 2023-02-10 ENCOUNTER — TELEPHONE (OUTPATIENT)
Dept: INTERNAL MEDICINE | Facility: CLINIC | Age: 78
End: 2023-02-10
Payer: MEDICARE

## 2023-02-10 ENCOUNTER — HOSPITAL ENCOUNTER (OUTPATIENT)
Dept: RADIOLOGY | Facility: HOSPITAL | Age: 78
Discharge: HOME | End: 2023-02-10
Attending: FAMILY MEDICINE
Payer: MEDICARE

## 2023-02-10 DIAGNOSIS — R10.33 PERIUMBILICAL ABDOMINAL PAIN: ICD-10-CM

## 2023-02-10 PROCEDURE — 74019 RADEX ABDOMEN 2 VIEWS: CPT | Mod: 26,,, | Performed by: RADIOLOGY

## 2023-02-10 PROCEDURE — 74019 XR ABDOMEN FLAT AND ERECT: ICD-10-PCS | Mod: 26,,, | Performed by: RADIOLOGY

## 2023-02-10 PROCEDURE — 74019 RADEX ABDOMEN 2 VIEWS: CPT | Mod: TC,PO

## 2023-02-10 NOTE — TELEPHONE ENCOUNTER
----- Message from Chapincito Winters sent at 2/10/2023  9:55 AM CST -----  Contact: [phone]  Pt is calling regarding labs . Please call back at [phone] . Thanksdj

## 2023-02-11 NOTE — PROGRESS NOTES
"Subjective:      Patient ID: Jannet Hayes is a 77 y.o. female.    Chief Complaint: Abdominal Pain      Patient reports periumbilical pain, worse postprandially, intermittent, describes as burning, stinging and prickly.  She has noted some bloating as well.  Reports feels similar to when she had peptic ulcer years ago    Abdominal Pain  Associated symptoms include constipation. Pertinent negatives include no diarrhea, nausea or vomiting.   Review of Systems   Constitutional:  Negative for activity change and appetite change.   Gastrointestinal:  Positive for abdominal pain and constipation. Negative for diarrhea, nausea and vomiting.   Past Medical History:   Diagnosis Date    Amnesia, global, transient 2019    Chronic constipation     Concussion 2020    History of IBS 2016    Skin cancer     Tremors of nervous system 2020    Vertigo 2020          Past Surgical History:   Procedure Laterality Date    ADENOIDECTOMY      AUGMENTATION OF BREAST  2008    silicone    LUMBAR LAMINECTOMY WITH SURGICAL REMOVAL OF LESION OF SPINAL CORD BY POSTERIOR APPROACH  1999     Family History   Problem Relation Age of Onset    Diabetes Mother     Stroke Mother     Lymphoma Mother     Heart disease Father      Social History     Socioeconomic History    Marital status:    Tobacco Use    Smoking status: Never    Smokeless tobacco: Never   Substance and Sexual Activity    Alcohol use: Yes     Comment: occas    Drug use: Never    Sexual activity: Not Currently     Review of patient's allergies indicates:   Allergen Reactions    Oxycodone-acetaminophen Anaphylaxis     HALLUCINATIONS    Codeine Other (See Comments)     hallucinations    Erythromycin Other (See Comments)     "odd thinking patterns, black dots in front of eyes"...."mycins"    Penicillins Hives     ITCHING, SWELLING.  Patient states "can take Keflex without any problems".  Other reaction(s): hives, swelling        Objective:       /88   Pulse 106   Temp 98.1 " "°F (36.7 °C) (Tympanic)   Ht 5' 4" (1.626 m)   Wt 64 kg (141 lb)   SpO2 96%   BMI 24.20 kg/m²   Physical Exam  Vitals reviewed.   Constitutional:       General: She is not in acute distress.     Appearance: Normal appearance. She is well-developed. She is not diaphoretic.   Cardiovascular:      Rate and Rhythm: Normal rate and regular rhythm.      Heart sounds: Normal heart sounds.   Pulmonary:      Effort: Pulmonary effort is normal. No respiratory distress.      Breath sounds: Normal breath sounds.   Abdominal:      General: Bowel sounds are normal.      Palpations: Abdomen is soft.      Tenderness: There is abdominal tenderness (PERIUMBILICAL). There is no guarding.   Neurological:      Mental Status: She is alert.   Psychiatric:         Mood and Affect: Mood normal.         Behavior: Behavior normal.         Thought Content: Thought content normal.         Judgment: Judgment normal.     Assessment:     1. Periumbilical abdominal pain    2. Chronic constipation      Plan:   Periumbilical abdominal pain  -     X-Ray Abdomen Flat And Erect; Future; Expected date: 02/09/2023  -     Comprehensive Metabolic Panel; Future; Expected date: 08/08/2023  -     LIPASE; Future; Expected date: 02/09/2023  -     H. pylori antigen, stool; Future; Expected date: 02/09/2023  -     CULTURE, STOOL; Future; Expected date: 02/09/2023    Chronic constipation    Other orders  -     sucralfate (CARAFATE) 1 gram tablet; Take 1 tablet (1 g total) by mouth 4 (four) times daily before meals and nightly.  Dispense: 40 tablet; Refill: 0    ER precautions if pain worsens  Medication List with Changes/Refills   New Medications    SUCRALFATE (CARAFATE) 1 GRAM TABLET    Take 1 tablet (1 g total) by mouth 4 (four) times daily before meals and nightly.   Current Medications    ACETAMINOPHEN (TYLENOL) 500 MG TABLET    Take 500 mg by mouth as needed.    ANTIARTHRITIC COMBINATION NO.2 900 MG TAB    Take 2 tablets by mouth once.    BIMATOPROST " (LATISSE) 0.03 % OPHTHALMIC SOLUTION    Place one drop on applicator and apply evenly along the skin of the upper eyelid at base of eyelashes once daily at bedtime; repeat procedure for second eye (use a clean applicator).    COD LIVER OIL ORAL    Take 1,000 mg by mouth once daily at 6am.    ESTRADIOL (VIVELLE-DOT) 0.075 MG/24 HR    Place onto the skin.    LUBIPROSTONE (AMITIZA) 24 MCG CAP    Take 1 capsule (24 mcg total) by mouth daily with breakfast.    MAGNESIUM HYDROXIDE 400 MG/5 ML (MILK OF MAGNESIA) 400 MG/5 ML SUSP    Take 30 mLs by mouth once daily.    MELOXICAM (MOBIC) 15 MG TABLET    Take 1 tablet (15 mg total) by mouth once daily.    MULTIVIT-IRON-FA-CALCIUM-MINS (THERA-TABS M) 27 MG IRON-400 MCG TAB    Take 1 tablet by mouth once daily at 6am.    NON FORMULARY MEDICATION    1 tablet.    OMEGA-3 FATTY ACIDS-FISH -1,000 MG CAP    Take 1,000 mg by mouth once daily at 6am.    PHOSPHATIDYL SERINE, BULK, MISC    300 mg by Misc.(Non-Drug; Combo Route) route once daily.    PROGESTERONE (PROMETRIUM) 100 MG CAPSULE    Take 200 mg by mouth nightly.    TANNIC/CHESTNUT/PEPPERMINT (ATRANTIL ORAL)    Take 550 mg by mouth once daily at 6am.    TAURINE 1,000 MG CAP    Take 1,000 mg by mouth once daily.    TRAMADOL (ULTRAM) 50 MG TABLET    Take 1 tablet (50 mg total) by mouth every 8 (eight) hours as needed for Pain.    TURMERIC ROOT EXTRACT 1,053 MG TAB    Take 1 tablet by mouth.    VITAMIN D (VITAMIN D3) 1000 UNITS TAB    Take by mouth.

## 2023-02-14 ENCOUNTER — TELEPHONE (OUTPATIENT)
Dept: INTERNAL MEDICINE | Facility: CLINIC | Age: 78
End: 2023-02-14
Payer: MEDICARE

## 2023-02-14 NOTE — TELEPHONE ENCOUNTER
----- Message from González Aburto MD sent at 2/10/2023  3:04 PM CST -----  Please notify her xray does show significant stool buildup at the end of the colon and gas - recommend she try fleet enema, should have lab results back on Monday

## 2023-02-14 NOTE — TELEPHONE ENCOUNTER
----- Message from González Aburto MD sent at 2/13/2023  7:45 AM CST -----  Please notify lab results are normal-does not appear to be her pancreas, less likely to be gallbladder causing the issue

## 2023-02-14 NOTE — TELEPHONE ENCOUNTER
Spoke with pt verbalized understanding of normal lab ; pt plan to call back in two week to schedule an appt for possible gallbladder issues

## 2023-02-17 ENCOUNTER — TELEPHONE (OUTPATIENT)
Dept: INTERNAL MEDICINE | Facility: CLINIC | Age: 78
End: 2023-02-17
Payer: MEDICARE

## 2023-02-17 NOTE — TELEPHONE ENCOUNTER
Spoke with pt verbalized understanding of xray result and recommendation to do enema upon review by Dr. Aburto

## 2023-02-20 ENCOUNTER — TELEPHONE (OUTPATIENT)
Dept: INTERNAL MEDICINE | Facility: CLINIC | Age: 78
End: 2023-02-20
Payer: MEDICARE

## 2023-02-20 NOTE — TELEPHONE ENCOUNTER
Spoke with pt verbalized understanding of lab results( stool ) and admitted to be feeling better upon taking the medication for treatment

## 2023-02-20 NOTE — TELEPHONE ENCOUNTER
----- Message from González Aburto MD sent at 2/20/2023  7:42 AM CST -----  Please notify her stool test did not grow out infection.  Is she feeling any better with the medication?  Does she need a referral to gastro?

## 2023-03-10 DIAGNOSIS — R00.0 TACHYCARDIA: Primary | ICD-10-CM

## 2023-03-10 DIAGNOSIS — Z00.00 ROUTINE HEALTH MAINTENANCE: ICD-10-CM

## 2023-03-20 ENCOUNTER — OFFICE VISIT (OUTPATIENT)
Dept: CARDIOLOGY | Facility: CLINIC | Age: 78
End: 2023-03-20
Payer: MEDICARE

## 2023-03-20 ENCOUNTER — HOSPITAL ENCOUNTER (OUTPATIENT)
Dept: CARDIOLOGY | Facility: HOSPITAL | Age: 78
Discharge: HOME | End: 2023-03-20
Attending: STUDENT IN AN ORGANIZED HEALTH CARE EDUCATION/TRAINING PROGRAM
Payer: MEDICARE

## 2023-03-20 VITALS
HEIGHT: 64 IN | OXYGEN SATURATION: 97 % | WEIGHT: 146.19 LBS | BODY MASS INDEX: 24.96 KG/M2 | SYSTOLIC BLOOD PRESSURE: 128 MMHG | HEART RATE: 77 BPM | DIASTOLIC BLOOD PRESSURE: 74 MMHG

## 2023-03-20 DIAGNOSIS — K58.9 IRRITABLE BOWEL SYNDROME, UNSPECIFIED TYPE: ICD-10-CM

## 2023-03-20 DIAGNOSIS — R42 VERTIGO: ICD-10-CM

## 2023-03-20 DIAGNOSIS — R00.0 TACHYCARDIA: ICD-10-CM

## 2023-03-20 DIAGNOSIS — Z00.00 ROUTINE HEALTH MAINTENANCE: ICD-10-CM

## 2023-03-20 DIAGNOSIS — R06.02 SOB (SHORTNESS OF BREATH): ICD-10-CM

## 2023-03-20 DIAGNOSIS — K21.9 GASTROESOPHAGEAL REFLUX DISEASE, UNSPECIFIED WHETHER ESOPHAGITIS PRESENT: ICD-10-CM

## 2023-03-20 DIAGNOSIS — R00.0 TACHYCARDIA: Primary | ICD-10-CM

## 2023-03-20 PROCEDURE — 3074F PR MOST RECENT SYSTOLIC BLOOD PRESSURE < 130 MM HG: ICD-10-PCS | Mod: CPTII,S$GLB,, | Performed by: STUDENT IN AN ORGANIZED HEALTH CARE EDUCATION/TRAINING PROGRAM

## 2023-03-20 PROCEDURE — 99999 PR PBB SHADOW E&M-EST. PATIENT-LVL III: CPT | Mod: PBBFAC,,, | Performed by: STUDENT IN AN ORGANIZED HEALTH CARE EDUCATION/TRAINING PROGRAM

## 2023-03-20 PROCEDURE — 3078F PR MOST RECENT DIASTOLIC BLOOD PRESSURE < 80 MM HG: ICD-10-PCS | Mod: CPTII,S$GLB,, | Performed by: STUDENT IN AN ORGANIZED HEALTH CARE EDUCATION/TRAINING PROGRAM

## 2023-03-20 PROCEDURE — 99214 PR OFFICE/OUTPT VISIT, EST, LEVL IV, 30-39 MIN: ICD-10-PCS | Mod: S$GLB,,, | Performed by: STUDENT IN AN ORGANIZED HEALTH CARE EDUCATION/TRAINING PROGRAM

## 2023-03-20 PROCEDURE — 99999 PR PBB SHADOW E&M-EST. PATIENT-LVL III: ICD-10-PCS | Mod: PBBFAC,,, | Performed by: STUDENT IN AN ORGANIZED HEALTH CARE EDUCATION/TRAINING PROGRAM

## 2023-03-20 PROCEDURE — 1101F PT FALLS ASSESS-DOCD LE1/YR: CPT | Mod: CPTII,S$GLB,, | Performed by: STUDENT IN AN ORGANIZED HEALTH CARE EDUCATION/TRAINING PROGRAM

## 2023-03-20 PROCEDURE — 1126F PR PAIN SEVERITY QUANTIFIED, NO PAIN PRESENT: ICD-10-PCS | Mod: CPTII,S$GLB,, | Performed by: STUDENT IN AN ORGANIZED HEALTH CARE EDUCATION/TRAINING PROGRAM

## 2023-03-20 PROCEDURE — 3288F PR FALLS RISK ASSESSMENT DOCUMENTED: ICD-10-PCS | Mod: CPTII,S$GLB,, | Performed by: STUDENT IN AN ORGANIZED HEALTH CARE EDUCATION/TRAINING PROGRAM

## 2023-03-20 PROCEDURE — 1126F AMNT PAIN NOTED NONE PRSNT: CPT | Mod: CPTII,S$GLB,, | Performed by: STUDENT IN AN ORGANIZED HEALTH CARE EDUCATION/TRAINING PROGRAM

## 2023-03-20 PROCEDURE — 99214 OFFICE O/P EST MOD 30 MIN: CPT | Mod: S$GLB,,, | Performed by: STUDENT IN AN ORGANIZED HEALTH CARE EDUCATION/TRAINING PROGRAM

## 2023-03-20 PROCEDURE — 93010 ELECTROCARDIOGRAM REPORT: CPT | Mod: ,,, | Performed by: INTERNAL MEDICINE

## 2023-03-20 PROCEDURE — 93010 EKG 12-LEAD: ICD-10-PCS | Mod: ,,, | Performed by: INTERNAL MEDICINE

## 2023-03-20 PROCEDURE — 3074F SYST BP LT 130 MM HG: CPT | Mod: CPTII,S$GLB,, | Performed by: STUDENT IN AN ORGANIZED HEALTH CARE EDUCATION/TRAINING PROGRAM

## 2023-03-20 PROCEDURE — 3288F FALL RISK ASSESSMENT DOCD: CPT | Mod: CPTII,S$GLB,, | Performed by: STUDENT IN AN ORGANIZED HEALTH CARE EDUCATION/TRAINING PROGRAM

## 2023-03-20 PROCEDURE — 3078F DIAST BP <80 MM HG: CPT | Mod: CPTII,S$GLB,, | Performed by: STUDENT IN AN ORGANIZED HEALTH CARE EDUCATION/TRAINING PROGRAM

## 2023-03-20 PROCEDURE — 93005 ELECTROCARDIOGRAM TRACING: CPT

## 2023-03-20 PROCEDURE — 1101F PR PT FALLS ASSESS DOC 0-1 FALLS W/OUT INJ PAST YR: ICD-10-PCS | Mod: CPTII,S$GLB,, | Performed by: STUDENT IN AN ORGANIZED HEALTH CARE EDUCATION/TRAINING PROGRAM

## 2023-03-20 NOTE — PROGRESS NOTES
Section of Cardiology                  Cardiac Clinic Note    Chief Complaint/Reason for consultation:  Tachycardia      HPI:   Jannet Hayes is a 77 y.o. female with h/o vertigo, tremors, irritable bowel syndrome who was referred to cardiology clinic by PCP Dr. Aburto for evaluation.    6/15/2022  Reports being COVID about 1 month ago she believes, but did not get tested  Reports cough and SOB  Has had tachycardia, HR 140s with minimal exertion, taking a shower, putting on make up  Reports some SOB that started about 1 year ago, but worsening over the last few weeks, with minimal exertion. Palpitations with the SOB   Was having her symptoms prior to COVID  Reports bad GERD which can cause chest discomfort   Does not exercise regularly     Denies tobacco abuse. ETOH occ  Denies syncope,   Denies CVA, DM, HTN   Family hx: uncle- possible MI, Gf-  of MI at 78 yo possibly      22  Had shoulder pain when waking up this morning, not worse with movement   7 day cardiac monitor with ST with symptoms, avg HR normal   Echo with normal EF  ETT negative for ischemia   Had minimal exertion when had palpitations   Reports normal hydration, drinks a lot of tea     Denies chest pain, syncope       22  Has some palpitations at times, HR has not been as high  Has been active   Preop for D&C procedure with Obgyn  Gets dizzy at times, with lying down     Denies chest pain, syncope       3/20/23  Small chest pain at times, intermittent, short lasting  Gets tachycardia at times  Does get tired still  Not as active, mostly at home  Says she's been more sedentary since COVID   Gets dizziness at times   SOB stable       EKG 3/20/23 NSR  EKG 6/15/2022 NSR, no acute ST - T wave changes,  ms    ECHO  22  The left ventricle is small with concentric remodeling and normal systolic function.  The estimated ejection fraction is 60%.  Normal left ventricular diastolic function.  Normal right ventricular  "size with normal right ventricular systolic function.  Mild tricuspid regurgitation.  Normal central venous pressure (3 mmHg).  The estimated PA systolic pressure is 23 mmHg.        STRESS TEST    [facility]      ROS: All 10 systems reviewed. Please refer to the HPI for pertinent positives. All other systems negative.     Past Medical History  Past Medical History:   Diagnosis Date    Amnesia, global, transient 2019    Chronic constipation     Concussion 2020    History of IBS 2016    Skin cancer     Tremors of nervous system 2020    Vertigo 2020       Surgical History  Past Surgical History:   Procedure Laterality Date    ADENOIDECTOMY      AUGMENTATION OF BREAST  2008    silicone    LUMBAR LAMINECTOMY WITH SURGICAL REMOVAL OF LESION OF SPINAL CORD BY POSTERIOR APPROACH  1999          Allergies:   Review of patient's allergies indicates:   Allergen Reactions    Oxycodone-acetaminophen Anaphylaxis     HALLUCINATIONS    Codeine Other (See Comments)     hallucinations    Erythromycin Other (See Comments)     "odd thinking patterns, black dots in front of eyes"...."mycins"    Penicillins Hives     ITCHING, SWELLING.  Patient states "can take Keflex without any problems".  Other reaction(s): hives, swelling        Social History:  Social History     Socioeconomic History    Marital status:    Tobacco Use    Smoking status: Never    Smokeless tobacco: Never   Substance and Sexual Activity    Alcohol use: Yes     Comment: occas    Drug use: Never    Sexual activity: Not Currently       Family History:  family history includes Diabetes in her mother; Heart disease in her father; Lymphoma in her mother; Stroke in her mother.    Home Medications:  Current Outpatient Medications on File Prior to Visit   Medication Sig Dispense Refill    acetaminophen (TYLENOL) 500 MG tablet Take 500 mg by mouth as needed.      antiarthritic combination no.2 900 mg Tab Take 2 tablets by mouth once.      bimatoprost (LATISSE) 0.03 % ophthalmic " "solution Place one drop on applicator and apply evenly along the skin of the upper eyelid at base of eyelashes once daily at bedtime; repeat procedure for second eye (use a clean applicator). 5 mL 11    COD LIVER OIL ORAL Take 1,000 mg by mouth once daily at 6am.      magnesium hydroxide 400 mg/5 ml (MILK OF MAGNESIA) 400 mg/5 mL Susp Take 30 mLs by mouth once daily.      multivit-iron-FA-calcium-mins (THERA-TABS M) 27 mg iron-400 mcg Tab Take 1 tablet by mouth once daily at 6am.      NON FORMULARY MEDICATION 1 tablet.      omega-3 fatty acids-fish oil 340-1,000 mg Cap Take 1,000 mg by mouth once daily at 6am.      PHOSPHATIDYL SERINE, BULK, MISC 300 mg by Misc.(Non-Drug; Combo Route) route once daily.      progesterone (PROMETRIUM) 100 MG capsule Take 200 mg by mouth nightly.      sucralfate (CARAFATE) 1 gram tablet Take 1 tablet (1 g total) by mouth 4 (four) times daily before meals and nightly. 40 tablet 0    tannic/chestnut/peppermint (ATRANTIL ORAL) Take 550 mg by mouth once daily at 6am.      taurine 1,000 mg Cap Take 1,000 mg by mouth once daily.      turmeric root extract 1,053 mg Tab Take 1 tablet by mouth.      vitamin D (VITAMIN D3) 1000 units Tab Take by mouth.      estradioL (VIVELLE-DOT) 0.075 mg/24 hr Place onto the skin.      lubiprostone (AMITIZA) 24 MCG Cap Take 1 capsule (24 mcg total) by mouth daily with breakfast. (Patient not taking: Reported on 6/13/2022) 30 capsule 2    meloxicam (MOBIC) 15 MG tablet Take 1 tablet (15 mg total) by mouth once daily. (Patient not taking: Reported on 2/9/2023) 30 tablet 1    traMADoL (ULTRAM) 50 mg tablet Take 1 tablet (50 mg total) by mouth every 8 (eight) hours as needed for Pain. (Patient not taking: Reported on 2/9/2023) 20 tablet 0     No current facility-administered medications on file prior to visit.       Physical exam:  Pulse 77   Ht 5' 4" (1.626 m)   Wt 66.3 kg (146 lb 2.6 oz)   SpO2 97%   BMI 25.09 kg/m²         General: Pt is a 77 y.o. year old " "female who is AAOx3, in NAD, is pleasant, well nourished, looks stated age  HEENT: PERRL, EOMI, Oral mucosa pink & moist  CVS  No abnormal cardiac pulsations noted on inspection. JVP not raised. The apical impulse is normal on palpation, and is located in the left 5th intercostal space in the mid - clavicular line. No palpable thrills or abnormal pulsations noted. RR, S1 - S2 heard, no murmurs, rubs or gallops appreciated.   PUL : CTA B/L. No wheezes/crackles heard   ABD : BS +, soft. No tenderness elicited   LE : No C/C/E. Distal Pulses palpable B/L         LABS:    Chemistry:   Lab Results   Component Value Date     02/10/2023    K 4.0 02/10/2023     02/10/2023    CO2 17 (L) 02/10/2023    BUN 15 02/10/2023    CREATININE 0.8 02/10/2023    CALCIUM 10.0 02/10/2023     Cardiac Markers: No results found for: CKTOTAL, CKMB, CKMBINDEX, TROPONINI  Cardiac Markers (Last 3): No results found for: CKTOTAL, CKMB, CKMBINDEX, TROPONINI  CBC:   Lab Results   Component Value Date    WBC 8.27 10/05/2022    HGB 15.2 10/05/2022    HCT 45.2 10/05/2022    MCV 91 10/05/2022     10/05/2022     Lipids:   Lab Results   Component Value Date    CHOL 218 (H) 03/21/2022    TRIG 132 03/21/2022    HDL 91 (H) 03/21/2022     Coagulation: No results found for: PT, INR, APTT        Assessment    1. Tachycardia    2. Vertigo    3. Irritable bowel syndrome, unspecified type    4. Gastroesophageal reflux disease, unspecified whether esophagitis present    5. SOB (shortness of breath)       Plan:    Tachycardia/shortness of breath- stable  7 day cardiac monitor 6/22 with ST with symptoms, avg HR normal   Echo 6/22 with normal EF  ETT 6/22 negative for ischemia   Continue with increased hydration     Vertigo  Stable  No current symptoms     IBS/GERD  Has been uncontrolled, has bad "gas"  Currently not on medication       This note was prepared using voice recognition system and is likely to have sound alike errors that may have been " overlooked even after proofreading.     I have reviewed all pertinent chart information.  Plans and recommendations have been formulated under my direct supervision. All questions answered and patient voiced understanding.   If symptoms persist go to the ED.    RTC in 1 year         Itzel Mcdermott MD  Cardiology

## 2023-03-29 ENCOUNTER — TELEPHONE (OUTPATIENT)
Dept: ADMINISTRATIVE | Facility: HOSPITAL | Age: 78
End: 2023-03-29
Payer: MEDICARE

## 2023-03-29 ENCOUNTER — PATIENT MESSAGE (OUTPATIENT)
Dept: ADMINISTRATIVE | Facility: HOSPITAL | Age: 78
End: 2023-03-29
Payer: MEDICARE

## 2023-04-19 ENCOUNTER — PATIENT MESSAGE (OUTPATIENT)
Dept: ADMINISTRATIVE | Facility: HOSPITAL | Age: 78
End: 2023-04-19
Payer: MEDICARE

## 2023-05-31 ENCOUNTER — PES CALL (OUTPATIENT)
Dept: ADMINISTRATIVE | Facility: CLINIC | Age: 78
End: 2023-05-31
Payer: MEDICARE

## 2023-08-30 ENCOUNTER — OFFICE VISIT (OUTPATIENT)
Dept: OPHTHALMOLOGY | Facility: CLINIC | Age: 78
End: 2023-08-30
Payer: MEDICARE

## 2023-08-30 DIAGNOSIS — Z96.1 PSEUDOPHAKIA OF BOTH EYES: Primary | ICD-10-CM

## 2023-08-30 DIAGNOSIS — H53.2 DIPLOPIA: ICD-10-CM

## 2023-08-30 DIAGNOSIS — H50.05 ESOTROPIA, ALTERNATING: ICD-10-CM

## 2023-08-30 PROBLEM — K58.9 IBS (IRRITABLE BOWEL SYNDROME): Status: ACTIVE | Noted: 2023-08-30

## 2023-08-30 PROBLEM — H04.123 DRY EYES, BILATERAL: Status: ACTIVE | Noted: 2017-09-13

## 2023-08-30 PROBLEM — N95.0 PMB (POSTMENOPAUSAL BLEEDING): Status: ACTIVE | Noted: 2023-08-30

## 2023-08-30 PROBLEM — R41.3 MEMORY LOSS: Status: ACTIVE | Noted: 2019-09-06

## 2023-08-30 PROCEDURE — 1159F PR MEDICATION LIST DOCUMENTED IN MEDICAL RECORD: ICD-10-PCS | Mod: CPTII,S$GLB,, | Performed by: STUDENT IN AN ORGANIZED HEALTH CARE EDUCATION/TRAINING PROGRAM

## 2023-08-30 PROCEDURE — 99204 PR OFFICE/OUTPT VISIT, NEW, LEVL IV, 45-59 MIN: ICD-10-PCS | Mod: S$GLB,,, | Performed by: STUDENT IN AN ORGANIZED HEALTH CARE EDUCATION/TRAINING PROGRAM

## 2023-08-30 PROCEDURE — 1160F PR REVIEW ALL MEDS BY PRESCRIBER/CLIN PHARMACIST DOCUMENTED: ICD-10-PCS | Mod: CPTII,S$GLB,, | Performed by: STUDENT IN AN ORGANIZED HEALTH CARE EDUCATION/TRAINING PROGRAM

## 2023-08-30 PROCEDURE — 92083 EXTENDED VISUAL FIELD XM: CPT | Mod: S$GLB,,, | Performed by: STUDENT IN AN ORGANIZED HEALTH CARE EDUCATION/TRAINING PROGRAM

## 2023-08-30 PROCEDURE — 1159F MED LIST DOCD IN RCRD: CPT | Mod: CPTII,S$GLB,, | Performed by: STUDENT IN AN ORGANIZED HEALTH CARE EDUCATION/TRAINING PROGRAM

## 2023-08-30 PROCEDURE — 99204 OFFICE O/P NEW MOD 45 MIN: CPT | Mod: S$GLB,,, | Performed by: STUDENT IN AN ORGANIZED HEALTH CARE EDUCATION/TRAINING PROGRAM

## 2023-08-30 PROCEDURE — 99999 PR PBB SHADOW E&M-EST. PATIENT-LVL III: ICD-10-PCS | Mod: PBBFAC,,, | Performed by: STUDENT IN AN ORGANIZED HEALTH CARE EDUCATION/TRAINING PROGRAM

## 2023-08-30 PROCEDURE — 99999 PR PBB SHADOW E&M-EST. PATIENT-LVL III: CPT | Mod: PBBFAC,,, | Performed by: STUDENT IN AN ORGANIZED HEALTH CARE EDUCATION/TRAINING PROGRAM

## 2023-08-30 PROCEDURE — 1160F RVW MEDS BY RX/DR IN RCRD: CPT | Mod: CPTII,S$GLB,, | Performed by: STUDENT IN AN ORGANIZED HEALTH CARE EDUCATION/TRAINING PROGRAM

## 2023-08-30 PROCEDURE — 92083 HUMPHREY VISUAL FIELD - OU - BOTH EYES: ICD-10-PCS | Mod: S$GLB,,, | Performed by: STUDENT IN AN ORGANIZED HEALTH CARE EDUCATION/TRAINING PROGRAM

## 2023-08-30 RX ORDER — ESTRADIOL 0.07 MG/D
FILM, EXTENDED RELEASE TRANSDERMAL
COMMUNITY
Start: 2022-05-27

## 2023-08-30 RX ORDER — SOD SULF/POT CHLORIDE/MAG SULF 1.479 G
TABLET ORAL
COMMUNITY
Start: 2023-05-23 | End: 2023-11-02

## 2023-08-30 RX ORDER — POLYETHYLENE GLYCOL 3350, SODIUM SULFATE ANHYDROUS, SODIUM BICARBONATE, SODIUM CHLORIDE, POTASSIUM CHLORIDE 236; 22.74; 6.74; 5.86; 2.97 G/4L; G/4L; G/4L; G/4L; G/4L
POWDER, FOR SOLUTION ORAL
COMMUNITY
Start: 2023-06-15 | End: 2023-11-02

## 2023-08-30 RX ORDER — VIT C/E/ZN/COPPR/LUTEIN/ZEAXAN 250MG-90MG
CAPSULE ORAL
COMMUNITY
Start: 2022-05-27

## 2023-08-30 RX ORDER — TURMERIC 400 MG
CAPSULE ORAL
COMMUNITY
Start: 2022-05-27

## 2023-08-30 RX ORDER — ESTRADIOL 0.05 MG/D
FILM, EXTENDED RELEASE TRANSDERMAL
COMMUNITY
Start: 2023-08-05

## 2023-08-30 RX ORDER — CHROMIUM PICOLINATE 200 MCG
TABLET ORAL
COMMUNITY
Start: 2022-05-27

## 2023-08-30 RX ORDER — OMEGA-3/DHA/EPA/FISH OIL 100-150 MG
1 CAPSULE ORAL
COMMUNITY
Start: 2022-05-27

## 2023-08-30 NOTE — PROGRESS NOTES
HPI     Annual Exam     Additional comments: Pt states she has been having double vision for   about 6 months. Pt states she notices it mostly while laying down and   watching tv. Pt states she had cataract surgery 3 years ago. Pt states her   eyes are often dry. Pt denies any pain.           Comments    Cataract surgery.          Last edited by Valeria Vila on 8/30/2023  8:13 AM.            Assessment /Plan     For exam results, see Encounter Report.    Pseudophakia of both eyes- Stable, follow    Esotropia, alternating, mild diplopia-  - Accommodating well, surgery is not recommended  HVF WNL, Color plates WNL, G/MOCT WNL    CT done 10/5/2022, will repeat CT for coverage      Return to clinic in 1 year DFE or PRN

## 2023-09-13 ENCOUNTER — HOSPITAL ENCOUNTER (OUTPATIENT)
Dept: RADIOLOGY | Facility: HOSPITAL | Age: 78
Discharge: HOME | End: 2023-09-13
Attending: STUDENT IN AN ORGANIZED HEALTH CARE EDUCATION/TRAINING PROGRAM
Payer: MEDICARE

## 2023-09-13 DIAGNOSIS — H53.2 DIPLOPIA: ICD-10-CM

## 2023-09-13 DIAGNOSIS — H50.05 ESOTROPIA, ALTERNATING: ICD-10-CM

## 2023-09-13 PROCEDURE — 70450 CT HEAD/BRAIN W/O DYE: CPT | Mod: TC

## 2023-09-13 PROCEDURE — 70450 CT HEAD/BRAIN W/O DYE: CPT | Mod: 26,,, | Performed by: RADIOLOGY

## 2023-09-13 PROCEDURE — 70450 CT HEAD WITHOUT CONTRAST: ICD-10-PCS | Mod: 26,,, | Performed by: RADIOLOGY

## 2023-10-31 RX ORDER — SUCRALFATE 1 G/1
1 TABLET ORAL
Qty: 40 TABLET | Refills: 0 | OUTPATIENT
Start: 2023-10-31

## 2023-11-02 ENCOUNTER — OFFICE VISIT (OUTPATIENT)
Dept: INTERNAL MEDICINE | Facility: CLINIC | Age: 78
End: 2023-11-02
Payer: MEDICARE

## 2023-11-02 DIAGNOSIS — K29.00 ACUTE GASTRITIS WITHOUT HEMORRHAGE, UNSPECIFIED GASTRITIS TYPE: Primary | ICD-10-CM

## 2023-11-02 PROCEDURE — 1125F PR PAIN SEVERITY QUANTIFIED, PAIN PRESENT: ICD-10-PCS | Mod: CPTII,95,, | Performed by: FAMILY MEDICINE

## 2023-11-02 PROCEDURE — 99214 PR OFFICE/OUTPT VISIT, EST, LEVL IV, 30-39 MIN: ICD-10-PCS | Mod: 95,,, | Performed by: FAMILY MEDICINE

## 2023-11-02 PROCEDURE — 1125F AMNT PAIN NOTED PAIN PRSNT: CPT | Mod: CPTII,95,, | Performed by: FAMILY MEDICINE

## 2023-11-02 PROCEDURE — 1101F PR PT FALLS ASSESS DOC 0-1 FALLS W/OUT INJ PAST YR: ICD-10-PCS | Mod: CPTII,95,, | Performed by: FAMILY MEDICINE

## 2023-11-02 PROCEDURE — 3288F PR FALLS RISK ASSESSMENT DOCUMENTED: ICD-10-PCS | Mod: CPTII,95,, | Performed by: FAMILY MEDICINE

## 2023-11-02 PROCEDURE — 1160F RVW MEDS BY RX/DR IN RCRD: CPT | Mod: CPTII,95,, | Performed by: FAMILY MEDICINE

## 2023-11-02 PROCEDURE — 3288F FALL RISK ASSESSMENT DOCD: CPT | Mod: CPTII,95,, | Performed by: FAMILY MEDICINE

## 2023-11-02 PROCEDURE — 1160F PR REVIEW ALL MEDS BY PRESCRIBER/CLIN PHARMACIST DOCUMENTED: ICD-10-PCS | Mod: CPTII,95,, | Performed by: FAMILY MEDICINE

## 2023-11-02 PROCEDURE — 99214 OFFICE O/P EST MOD 30 MIN: CPT | Mod: 95,,, | Performed by: FAMILY MEDICINE

## 2023-11-02 PROCEDURE — 1101F PT FALLS ASSESS-DOCD LE1/YR: CPT | Mod: CPTII,95,, | Performed by: FAMILY MEDICINE

## 2023-11-02 PROCEDURE — 1159F PR MEDICATION LIST DOCUMENTED IN MEDICAL RECORD: ICD-10-PCS | Mod: CPTII,95,, | Performed by: FAMILY MEDICINE

## 2023-11-02 PROCEDURE — 1159F MED LIST DOCD IN RCRD: CPT | Mod: CPTII,95,, | Performed by: FAMILY MEDICINE

## 2023-11-02 RX ORDER — SUCRALFATE 1 G/1
1 TABLET ORAL 4 TIMES DAILY PRN
Qty: 40 TABLET | Refills: 0 | Status: SHIPPED | OUTPATIENT
Start: 2023-11-02 | End: 2023-11-12

## 2023-11-02 NOTE — PROGRESS NOTES
Subjective:       Patient ID: Jannet Hayes is a 77 y.o. female.    Chief Complaint: Gastroesophageal Reflux and Abdominal Pain    The patient location is: home   The chief complaint leading to consultation is: abdominal pain    Visit type: audiovisual    Face to Face time with patient: 3 minutes (chart review started 12:29 pm; video started 12:32 pm; video ended 12:35 pm)  9 minutes of total time spent on the encounter, which includes face to face time and non-face to face time preparing to see the patient (eg, review of tests), Obtaining and/or reviewing separately obtained history, Documenting clinical information in the electronic or other health record, Independently interpreting results (not separately reported) and communicating results to the patient/family/caregiver, or Care coordination (not separately reported).     Each patient to whom he or she provides medical services by telemedicine is:  (1) informed of the relationship between the physician and patient and the respective role of any other health care provider with respect to management of the patient; and (2) notified that he or she may decline to receive medical services by telemedicine and may withdraw from such care at any time.    PCP: Dr. Aburto    Patient is new to me. Virtual visit for abdominal pain. Patient reports recurring burning pain behind her belly button with associated bloating and gas. She reports prior history in February 2023. She reports being treated with sucralfate at that time which resolved her symptoms. Reports symptoms x 3-4 days. Symptoms worse after eating. Reports mild nausea occasionally. No vomiting. No black or bloody stools. No fever or chills. Had colonoscopy/EGD in August, she reports all they found was diverticulosis. Patient is otherwise without concerns today.        Abdominal Pain  This is a recurrent problem. The current episode started in the past 7 days. The onset quality is gradual. The problem  occurs 2 to 4 times per day. The most recent episode lasted 6 days. The problem has been waxing and waning. The pain is located in the periumbilical region. The pain is at a severity of 3/10. The pain is moderate. The quality of the pain is burning and a sensation of fullness. Associated symptoms include anorexia, belching, constipation, flatus and frequency. Pertinent negatives include no arthralgias, diarrhea, dysuria, fever, headaches, hematochezia, hematuria, melena, myalgias, nausea, vomiting or weight loss. The pain is aggravated by certain positions and eating. The pain is relieved by Nothing. She has tried acetaminophen for the symptoms. The treatment provided mild relief. Prior diagnostic workup includes lower endoscopy and upper endoscopy. Her past medical history is significant for abdominal surgery, GERD and irritable bowel syndrome. There is no history of colon cancer, Crohn's disease, gallstones, pancreatitis, PUD or ulcerative colitis. Patient's medical history includes UTI. Patient's medical history does not include kidney stones.   Gastroesophageal Reflux  She complains of abdominal pain and belching. She reports no nausea. Pertinent negatives include no melena or weight loss.     Review of Systems   Constitutional:  Negative for fever and weight loss.   Gastrointestinal:  Positive for abdominal pain, anorexia, constipation and flatus. Negative for diarrhea, hematochezia, melena, nausea and vomiting.   Genitourinary:  Positive for frequency. Negative for dysuria and hematuria.   Musculoskeletal:  Negative for arthralgias and myalgias.   Neurological:  Negative for headaches.         Objective:      Physical Exam  Constitutional:       General: She is not in acute distress.     Appearance: She is well-developed.   HENT:      Head: Normocephalic and atraumatic.   Eyes:      General: Lids are normal. No scleral icterus.     Extraocular Movements: Extraocular movements intact.      Conjunctiva/sclera:  Conjunctivae normal.   Pulmonary:      Effort: Pulmonary effort is normal.   Neurological:      Mental Status: She is alert and oriented to person, place, and time.   Psychiatric:         Mood and Affect: Mood and affect normal.         Assessment:       1. Acute gastritis without hemorrhage, unspecified gastritis type        Plan:   1. Acute gastritis without hemorrhage, unspecified gastritis type  -     sucralfate (CARAFATE) 1 gram tablet; Take 1 tablet (1 g total) by mouth 4 (four) times daily as needed (abdominal pain).  Dispense: 40 tablet; Refill: 0    Advised to seek medical attention in person if symptoms worse/persist.  Patient expressed understanding and agreement with plan.

## 2023-11-06 ENCOUNTER — OFFICE VISIT (OUTPATIENT)
Dept: URGENT CARE | Facility: CLINIC | Age: 78
End: 2023-11-06
Payer: MEDICARE

## 2023-11-06 VITALS
HEIGHT: 64 IN | OXYGEN SATURATION: 96 % | BODY MASS INDEX: 24.92 KG/M2 | WEIGHT: 146 LBS | SYSTOLIC BLOOD PRESSURE: 112 MMHG | DIASTOLIC BLOOD PRESSURE: 65 MMHG | TEMPERATURE: 99 F | HEART RATE: 79 BPM | RESPIRATION RATE: 20 BRPM

## 2023-11-06 DIAGNOSIS — S41.111A SKIN TEAR OF RIGHT UPPER ARM WITHOUT COMPLICATION, INITIAL ENCOUNTER: Primary | ICD-10-CM

## 2023-11-06 PROCEDURE — 99213 OFFICE O/P EST LOW 20 MIN: CPT | Mod: S$GLB,,, | Performed by: NURSE PRACTITIONER

## 2023-11-06 PROCEDURE — 99213 PR OFFICE/OUTPT VISIT, EST, LEVL III, 20-29 MIN: ICD-10-PCS | Mod: S$GLB,,, | Performed by: NURSE PRACTITIONER

## 2023-11-06 RX ORDER — MUPIROCIN 20 MG/G
OINTMENT TOPICAL 3 TIMES DAILY
Qty: 30 G | Refills: 1 | Status: SHIPPED | OUTPATIENT
Start: 2023-11-06 | End: 2023-11-13

## 2023-11-06 RX ORDER — MUPIROCIN 20 MG/G
OINTMENT TOPICAL
Status: COMPLETED | OUTPATIENT
Start: 2023-11-06 | End: 2023-11-06

## 2023-11-06 RX ORDER — CLINDAMYCIN HYDROCHLORIDE 300 MG/1
300 CAPSULE ORAL EVERY 8 HOURS
Qty: 21 CAPSULE | Refills: 0 | Status: SHIPPED | OUTPATIENT
Start: 2023-11-06 | End: 2023-11-13

## 2023-11-06 RX ADMIN — MUPIROCIN: 20 OINTMENT TOPICAL at 01:11

## 2023-11-06 NOTE — PROGRESS NOTES
"Subjective:      Patient ID: Jannet Hayes is a 77 y.o. female.    Vitals:  height is 5' 4" (1.626 m) and weight is 66.2 kg (146 lb). Her temperature is 98.5 °F (36.9 °C). Her blood pressure is 112/65 and her pulse is 79. Her respiration is 20 and oxygen saturation is 96%.     Chief Complaint: No chief complaint on file.    Patient presents with a skin tear of right forearm.  This happened  3 days ago. The skin is black and she is concerned about infection.     Arm Pain   The incident occurred 3 to 5 days ago. The incident occurred at home. The injury mechanism was a direct blow. The pain is present in the right forearm. Quality: pain to touch. The pain is at a severity of 0/10. The patient is experiencing no pain. Pertinent negatives include no chest pain, muscle weakness, numbness or tingling. Nothing aggravates the symptoms. Treatments tried: soap and water neosporin. The treatment provided no relief.       Cardiovascular:  Negative for chest pain.   Skin:  Negative for erythema.   Neurological:  Negative for numbness.      Objective:     Vitals:    11/06/23 1202   BP: 112/65   BP Location: Right arm   Patient Position: Sitting   BP Method: Medium (Automatic)   Pulse: 79   Resp: 20   Temp: 98.5 °F (36.9 °C)   SpO2: 96%   Weight: 66.2 kg (146 lb)   Height: 5' 4" (1.626 m)       Physical Exam   Constitutional: She is oriented to person, place, and time. She appears well-developed.   HENT:   Head: Normocephalic and atraumatic. Head is without abrasion, without contusion and without laceration.   Ears:   Right Ear: External ear normal.   Left Ear: External ear normal.   Nose: Nose normal.   Mouth/Throat: Oropharynx is clear and moist and mucous membranes are normal.   Eyes: Conjunctivae, EOM and lids are normal. Pupils are equal, round, and reactive to light.   Neck: Trachea normal and phonation normal. Neck supple.   Cardiovascular: Normal rate, regular rhythm and normal heart sounds.   Pulmonary/Chest: Effort " normal and breath sounds normal. No stridor. No respiratory distress.   Musculoskeletal: Normal range of motion.         General: Normal range of motion.   Neurological: She is alert and oriented to person, place, and time.   Skin: Skin is warm, intact and no rash. Capillary refill takes less than 2 seconds. No abrasion, No burn, No bruising, No erythema and No ecchymosis         Comments:  R mid forearm wound; irregular starburst shaped skin tear with moderately approximating edges; + localized erythema without discharge/drainage.     Psychiatric: Her speech is normal and behavior is normal. Judgment and thought content normal.   Nursing note and vitals reviewed.      Assessment:     1. Skin tear of right upper arm without complication, initial encounter        Plan:     Patient stable for discharge and home management of condition  Bactroban and DSD applied and secured with coban         Skin tear of right upper arm without complication, initial encounter  -     mupirocin (BACTROBAN) 2 % ointment; Apply topically 3 (three) times daily. Apply after creams for 7 days  Dispense: 30 g; Refill: 1  -     clindamycin (CLEOCIN) 300 MG capsule; Take 1 capsule (300 mg total) by mouth every 8 (eight) hours. for 7 days  Dispense: 21 capsule; Refill: 0  -     mupirocin 2 % ointment  -     PT wound care; Future      Patient Instructions   Continue with wound care daily   Apply mupirocin antibiotic to wound area until healed   Skin may dry and slough off; wound will continue to heal from bottom upward   If redness warmth and tenderness develops around the wound and or purulent discharge, start antibiotic and follow up to [facility] for re evaluation

## 2023-11-06 NOTE — PATIENT INSTRUCTIONS
Continue with wound care daily   Apply mupirocin antibiotic to wound area until healed   Skin may dry and slough off; wound will continue to heal from bottom upward   If redness warmth and tenderness develops around the wound and or purulent discharge, start antibiotic and follow up to [facility] for re evaluation

## 2023-11-09 ENCOUNTER — TELEPHONE (OUTPATIENT)
Dept: URGENT CARE | Facility: CLINIC | Age: 78
End: 2023-11-09
Payer: MEDICARE

## 2023-11-09 NOTE — TELEPHONE ENCOUNTER
Made courtesy call to pt,pt states wound is starting to heal,pt, can not take anti-biotics with current medications list

## 2023-12-26 ENCOUNTER — TELEPHONE (OUTPATIENT)
Dept: INTERNAL MEDICINE | Facility: CLINIC | Age: 78
End: 2023-12-26
Payer: MEDICARE

## 2023-12-26 NOTE — TELEPHONE ENCOUNTER
Patient stated she feel like she possibly have kidney stones.  Informed Dr Aburto was out for the holidays. Offered patient an appointment with another [facility] doctor.  Patient declined that appt.  Informed she can be seen at Urgent Care or the ER.  Patient stated she will just go and get seen at Urgent Care.

## 2023-12-26 NOTE — TELEPHONE ENCOUNTER
----- Message from Alem Vieira sent at 12/26/2023  8:35 AM CST -----  Regarding: Same Day Appt  Contact: Jannet  Type:  Same Day Appointment Request    Caller is requesting a same day appointment.  Caller declined first available appointment listed below.    Name of Caller: Jannet   When is the first available appointment?  Symptoms: possibly kidney stones  Best Call Back Number: [phone] (home)    Additional Information: Jannet is Dr. Abutro patient but she is on vacation. MRN: [id number] Dr. Alcantar

## 2024-01-02 ENCOUNTER — NURSE TRIAGE (OUTPATIENT)
Dept: ADMINISTRATIVE | Facility: CLINIC | Age: 79
End: 2024-01-02
Payer: MEDICARE

## 2024-01-02 NOTE — TELEPHONE ENCOUNTER
Memory problems started this am, hot flash, not eating since last night, drinking small amount. Voiding but not her usual amount. Patient is coughing constantly, sounds very ill.  Speaking with partner, providing lots of information re her past medical hx. Aware no appointments open today at clinic. He feels it would be hard for her to go to ED. Advised my role as triage nurse and I cannot obtain appointment at [facility] if none open. Triage done per memory- see provider in 2 weeks. Triage done re weakness -dispo ED. Stressed to patient and partner need to be seen, she sounds very ill and may need hydration. Suggested UC may not be good fit, ED may be better Verb understanding.   Reason for Disposition   New or worsening memory (forgetfulness) problems   Patient sounds very sick or weak to the triager    Additional Information   Negative: [1] Difficult to awaken or acting confused (e.g., disoriented, slurred speech) AND [2] present now AND [3] new-onset AND [4] has diabetes (diabetes mellitus)   Negative: [1] Difficult to awaken or acting confused (e.g., disoriented, slurred speech) AND [2] present now AND [3] new-onset   Negative: [1] Weakness of the face, arm, or leg on one side of the body AND [2] new-onset   Negative: [1] Numbness of the face, arm, or leg on one side of the body AND [2] new-onset   Negative: [1] Loss of speech or garbled speech AND [2] new-onset   Negative: Shock suspected (e.g., cold/pale/clammy skin, too weak to stand, low BP, rapid pulse)   Negative: Sounds like a life-threatening emergency to the triager   Negative: Severe agitation or behavior problem (e.g., patient endangering self or others)   Negative: [1] Worsening confusion AND [2] new-onset (hours to 3 days)   Negative: [1] Seeing, hearing, or feeling things that are not there (i.e., visual, auditory, or tactile hallucinations) AND [2] new or worsening   Negative: Fever > 100.4 F (38.0 C)   Negative: Patient sounds very sick or weak to  the triager   Negative: [1] Caller has URGENT question (includes prescribed medication questions) AND [2] triager unable to answer question   Negative: [1] Worsening confusion AND [2] gradual onset (days to weeks)   Negative: New or worsening agitation or behavior problem (e.g., change from patient's normal pattern)   Negative: New or worsening falling   Negative: [1] Caller has NON-URGENT question (includes prescribed medication questions) AND [2] triager unable to answer   Negative: SEVERE difficulty breathing (e.g., struggling for each breath, speaks in single words)   Negative: Shock suspected (e.g., cold/pale/clammy skin, too weak to stand, low BP, rapid pulse)   Negative: Difficult to awaken or acting confused (e.g., disoriented, slurred speech)   Negative: Fainted > 15 minutes ago and still feels too weak or dizzy to stand   Negative: SEVERE weakness (i.e., unable to walk or barely able to walk, requires support) and new-onset or worsening   Negative: Sounds like a life-threatening emergency to the triager   Negative: Difficulty breathing   Negative: Heart beating < 50 beats per minute OR > 140 beats per minute   Negative: Extra heartbeats, irregular heart beating, or heart is beating very fast (i.e., 'palpitations')   Negative: Follows large amount of bleeding (e.g., from vomiting, rectum, vagina) (Exception: Small transient weakness from sight of a small amount blood.)   Negative: Black or tarry bowel movements   Negative: MODERATE weakness or fatigue from poor fluid intake with no improvement after 2 hours of rest and fluids   Negative: Drinking very little and dehydration suspected (e.g., no urine > 12 hours, very dry mouth, very lightheaded)    Protocols used: Dementia Symptoms and Muiazjcds-X-BF, Weakness (Generalized) and Fatigue-A-OH

## 2024-01-02 NOTE — TELEPHONE ENCOUNTER
Patient stated she went to ED earlier today. The Er was super pack so they turned around and left.  She says she has a really bad cough right now, memory not the best but functional.  She does not have any drooping, slurred words.  She does feel a little dehydrated so she has been drinking more.  She says she may wait a little later in the day to go back to theER or will wait and go tomorrow (waiting to see how she feels).  I tried several times to encourage patient to get checked at the ER to be evaluated.  Explained these are symptoms of stroke.

## 2024-03-19 DIAGNOSIS — R00.0 TACHYCARDIA: Primary | ICD-10-CM

## 2024-03-20 ENCOUNTER — HOSPITAL ENCOUNTER (OUTPATIENT)
Dept: CARDIOLOGY | Facility: HOSPITAL | Age: 79
Discharge: HOME | End: 2024-03-20
Attending: STUDENT IN AN ORGANIZED HEALTH CARE EDUCATION/TRAINING PROGRAM
Payer: MEDICARE

## 2024-03-20 ENCOUNTER — OFFICE VISIT (OUTPATIENT)
Dept: CARDIOLOGY | Facility: CLINIC | Age: 79
End: 2024-03-20
Payer: MEDICARE

## 2024-03-20 VITALS
BODY MASS INDEX: 25.67 KG/M2 | SYSTOLIC BLOOD PRESSURE: 110 MMHG | WEIGHT: 150.38 LBS | DIASTOLIC BLOOD PRESSURE: 80 MMHG | HEART RATE: 83 BPM | HEIGHT: 64 IN

## 2024-03-20 DIAGNOSIS — R06.02 SOB (SHORTNESS OF BREATH): ICD-10-CM

## 2024-03-20 DIAGNOSIS — R00.0 TACHYCARDIA: ICD-10-CM

## 2024-03-20 DIAGNOSIS — R00.0 TACHYCARDIA: Primary | ICD-10-CM

## 2024-03-20 DIAGNOSIS — K21.9 GASTROESOPHAGEAL REFLUX DISEASE, UNSPECIFIED WHETHER ESOPHAGITIS PRESENT: ICD-10-CM

## 2024-03-20 DIAGNOSIS — R42 VERTIGO: ICD-10-CM

## 2024-03-20 LAB
OHS QRS DURATION: 68 MS
OHS QTC CALCULATION: 408 MS

## 2024-03-20 PROCEDURE — 99999 PR PBB SHADOW E&M-EST. PATIENT-LVL IV: CPT | Mod: PBBFAC,,, | Performed by: STUDENT IN AN ORGANIZED HEALTH CARE EDUCATION/TRAINING PROGRAM

## 2024-03-20 PROCEDURE — 99214 OFFICE O/P EST MOD 30 MIN: CPT | Mod: S$GLB,,, | Performed by: STUDENT IN AN ORGANIZED HEALTH CARE EDUCATION/TRAINING PROGRAM

## 2024-03-20 PROCEDURE — 1159F MED LIST DOCD IN RCRD: CPT | Mod: CPTII,S$GLB,, | Performed by: STUDENT IN AN ORGANIZED HEALTH CARE EDUCATION/TRAINING PROGRAM

## 2024-03-20 PROCEDURE — 93005 ELECTROCARDIOGRAM TRACING: CPT

## 2024-03-20 PROCEDURE — 3288F FALL RISK ASSESSMENT DOCD: CPT | Mod: CPTII,S$GLB,, | Performed by: STUDENT IN AN ORGANIZED HEALTH CARE EDUCATION/TRAINING PROGRAM

## 2024-03-20 PROCEDURE — 1101F PT FALLS ASSESS-DOCD LE1/YR: CPT | Mod: CPTII,S$GLB,, | Performed by: STUDENT IN AN ORGANIZED HEALTH CARE EDUCATION/TRAINING PROGRAM

## 2024-03-20 PROCEDURE — 1126F AMNT PAIN NOTED NONE PRSNT: CPT | Mod: CPTII,S$GLB,, | Performed by: STUDENT IN AN ORGANIZED HEALTH CARE EDUCATION/TRAINING PROGRAM

## 2024-03-20 PROCEDURE — 93010 ELECTROCARDIOGRAM REPORT: CPT | Mod: ,,, | Performed by: INTERNAL MEDICINE

## 2024-03-20 PROCEDURE — 3074F SYST BP LT 130 MM HG: CPT | Mod: CPTII,S$GLB,, | Performed by: STUDENT IN AN ORGANIZED HEALTH CARE EDUCATION/TRAINING PROGRAM

## 2024-03-20 PROCEDURE — 3079F DIAST BP 80-89 MM HG: CPT | Mod: CPTII,S$GLB,, | Performed by: STUDENT IN AN ORGANIZED HEALTH CARE EDUCATION/TRAINING PROGRAM

## 2024-03-20 NOTE — PROGRESS NOTES
Section of Cardiology                  Cardiac Clinic Note    Chief Complaint/Reason for consultation:  Tachycardia      HPI:   Jannet Hayes is a 78 y.o. female with h/o vertigo, tremors, irritable bowel syndrome who was referred to cardiology clinic by PCP Dr. Aburto for evaluation.    6/15/2022  Reports being COVID about 1 month ago she believes, but did not get tested  Reports cough and SOB  Has had tachycardia, HR 140s with minimal exertion, taking a shower, putting on make up  Reports some SOB that started about 1 year ago, but worsening over the last few weeks, with minimal exertion. Palpitations with the SOB   Was having her symptoms prior to COVID  Reports bad GERD which can cause chest discomfort   Does not exercise regularly     Denies tobacco abuse. ETOH occ  Denies syncope,   Denies CVA, DM, HTN   Family hx: uncle- possible MI, Gf-  of MI at 78 yo possibly      24  Had shoulder pain when waking up this morning, not worse with movement   7 day cardiac monitor with ST with symptoms, avg HR normal   Echo with normal EF  ETT negative for ischemia   Had minimal exertion when had palpitations   Reports normal hydration, drinks a lot of tea     Denies chest pain, syncope       22  Has some palpitations at times, HR has not been as high  Has been active   Preop for D&C procedure with Obgyn  Gets dizzy at times, with lying down     Denies chest pain, syncope       3/20/23  Small chest pain at times, intermittent, short lasting  Gets tachycardia at times  Does get tired still  Not as active, mostly at home  Says she's been more sedentary since COVID   Gets dizziness at times   SOB stable       3/20/24  Some tachy symptoms- but not has bad as before  Some swelling in legs with sitting for long time  Not watching salt intake  HR 83 today   Drinks 4 cups coffee daily -decaf  Not exercising - thinks she has MS and is always tired   Chronic cough     Denies chest pain, syncope,  "SOB        EKG 3/20/24 NSR   EKG 3/20/23 NSR  EKG 6/15/2022 NSR, no acute ST - T wave changes,  ms    ECHO  7/20/22  The left ventricle is small with concentric remodeling and normal systolic function.  The estimated ejection fraction is 60%.  Normal left ventricular diastolic function.  Normal right ventricular size with normal right ventricular systolic function.  Mild tricuspid regurgitation.  Normal central venous pressure (3 mmHg).  The estimated PA systolic pressure is 23 mmHg.        STRESS TEST    [facility]      ROS: All 10 systems reviewed. Please refer to the HPI for pertinent positives. All other systems negative.     Past Medical History  Past Medical History:   Diagnosis Date    Amnesia, global, transient 2019    Chronic constipation     Concussion 2020    History of IBS 2016    Skin cancer     Tremors of nervous system 2020    Vertigo 2020       Surgical History  Past Surgical History:   Procedure Laterality Date    ADENOIDECTOMY      APPENDECTOMY      AUGMENTATION OF BREAST  2008    silicone    COSMETIC SURGERY      EYE SURGERY      LUMBAR LAMINECTOMY WITH SURGICAL REMOVAL OF LESION OF SPINAL CORD BY POSTERIOR APPROACH  1999          Allergies:   Review of patient's allergies indicates:   Allergen Reactions    Oxycodone-acetaminophen Anaphylaxis     HALLUCINATIONS    Penicillins Hives     ITCHING, SWELLING.  Patient states "can take Keflex without any problems".  Other reaction(s): hives, swelling     Codeine Other (See Comments)     hallucinations    Erythromycin Other (See Comments)     "odd thinking patterns, black dots in front of eyes"...."mycins"       Social History:  Social History     Socioeconomic History    Marital status:    Tobacco Use    Smoking status: Never    Smokeless tobacco: Never   Substance and Sexual Activity    Alcohol use: Yes     Alcohol/week: 1.0 standard drink of alcohol     Types: 1 Glasses of wine per week     Comment: occas    Drug use: Never    Sexual activity: " Not Currently     Partners: Male     Birth control/protection: Post-menopausal       Family History:  family history includes Arthritis in her mother; Cancer in her mother; Diabetes in her mother; Heart disease in her father; Lymphoma in her mother; Stroke in her mother; Vision loss in her mother.    Home Medications:  Current Outpatient Medications on File Prior to Visit   Medication Sig Dispense Refill    acetaminophen (TYLENOL) 500 MG tablet Take 500 mg by mouth as needed.      acetylcysteine 600 mg TbIE Take by mouth.      amino acids 15-12.3 gram/86.2 gram PwPk Take by mouth.      antiarthritic combination no.2 900 mg Tab Take 2 tablets by mouth once.      ashwagandha root extract 300 mg Cap Take by mouth.      bimatoprost (LATISSE) 0.03 % ophthalmic solution Place one drop on applicator and apply evenly along the skin of the upper eyelid at base of eyelashes once daily at bedtime; repeat procedure for second eye (use a clean applicator). 5 mL 11    cholecalciferol, vitamin D3, (VITAMIN D3) 25 mcg (1,000 unit) capsule Take by mouth.      COD LIVER OIL ORAL Take 1,000 mg by mouth once daily at 6am.      DHA-PHOSPHATIDYLSERINE ORAL Take 300 capsules by mouth.      estradioL (VIVELLE-DOT) 0.075 mg/24 hr Place onto the skin.      estradioL (VIVELLE-DOT) 0.075 mg/24 hr Place onto the skin.      estradiol 0.05 mg/24 hr td ptsw (VIVELLE-DOT) 0.05 mg/24 hr Place onto the skin.      magnesium hydroxide 1,200 mg Chew Take by mouth.      magnesium hydroxide 400 mg/5 ml (MILK OF MAGNESIA) 400 mg/5 mL Susp Take 30 mLs by mouth once daily.      multivit with minerals/lutein (MULTIVITAMIN 50 PLUS ORAL) Take 1 tablet by mouth.      NON FORMULARY MEDICATION 1 tablet.      omega 3-dha-epa-fish oil 100-150-750 mg Cap Take 1 capsule by mouth.      omega-3 fatty acids-fish oil 340-1,000 mg Cap Take 1,000 mg by mouth once daily at 6am.      PHOSPHATIDYL SERINE, BULK, MISC 300 mg by Misc.(Non-Drug; Combo Route) route once daily.       "progesterone (PROMETRIUM) 100 MG capsule Take 200 mg by mouth nightly.      tannic/chestnut/peppermint (ATRANTIL ORAL) Take 550 mg by mouth once daily at 6am.      taurine 1,000 mg Cap Take 1,000 mg by mouth once daily.      turmeric 400 mg Cap Take by mouth.      turmeric root extract 1,053 mg Tab Take 1 tablet by mouth.      vit B complex 100 no.2/herbs (VITAMIN B COMPLEX 100  2-HERBS ORAL) Take 1 tablet by mouth.      vitamin D (VITAMIN D3) 1000 units Tab Take by mouth.       No current facility-administered medications on file prior to visit.       Physical exam:  /80 (BP Location: Right arm, Patient Position: Sitting, BP Method: Medium (Manual))   Pulse 83   Ht 5' 4" (1.626 m)   Wt 68.2 kg (150 lb 5.7 oz)   BMI 25.81 kg/m²         General: Pt is a 78 y.o. year old female who is AAOx3, in NAD, is pleasant, well nourished, looks stated age  HEENT: PERRL, EOMI, Oral mucosa pink & moist  CVS  No abnormal cardiac pulsations noted on inspection. JVP not raised. The apical impulse is normal on palpation, and is located in the left 5th intercostal space in the mid - clavicular line. No palpable thrills or abnormal pulsations noted. RR, S1 - S2 heard, no murmurs, rubs or gallops appreciated.   PUL : CTA B/L. No wheezes/crackles heard   ABD : BS +, soft. No tenderness elicited   LE : No C/C/E. Distal Pulses palpable B/L         LABS:    Chemistry:   Lab Results   Component Value Date     02/10/2023    K 4.0 02/10/2023     02/10/2023    CO2 17 (L) 02/10/2023    BUN 15 02/10/2023    CREATININE 0.8 02/10/2023    CALCIUM 10.0 02/10/2023     Cardiac Markers: No results found for: "CKTOTAL", "CKMB", "CKMBINDEX", "TROPONINI"  Cardiac Markers (Last 3): No results found for: "CKTOTAL", "CKMB", "CKMBINDEX", "TROPONINI"  CBC:   Lab Results   Component Value Date    WBC 8.27 10/05/2022    HGB 15.2 10/05/2022    HCT 45.2 10/05/2022    MCV 91 10/05/2022     10/05/2022     Lipids:   Lab Results   Component " "Value Date    CHOL 218 (H) 03/21/2022    TRIG 132 03/21/2022    HDL 91 (H) 03/21/2022     Coagulation: No results found for: "PT", "INR", "APTT"        Assessment    1. Tachycardia    2. Vertigo    3. Gastroesophageal reflux disease, unspecified whether esophagitis present    4. SOB (shortness of breath)         Plan:    Tachycardia/shortness of breath- stable  7 day cardiac monitor 6/22 with ST with symptoms, avg HR normal   Echo 6/22 with normal EF  ETT 6/22 negative for ischemia   Continue with increased hydration     Vertigo  Stable  No current symptoms     IBS/GERD  Has been uncontrolled, has bad "gas"  Currently not on medication       This note was prepared using voice recognition system and is likely to have sound alike errors that may have been overlooked even after proofreading.     I have reviewed all pertinent chart information.  Plans and recommendations have been formulated under my direct supervision. All questions answered and patient voiced understanding.   If symptoms persist go to the ED.    RTC in 1 year         Itzel Mcdermott MD  Cardiology                "

## 2024-04-08 ENCOUNTER — TELEPHONE (OUTPATIENT)
Dept: INTERNAL MEDICINE | Facility: CLINIC | Age: 79
End: 2024-04-08
Payer: MEDICARE

## 2024-04-08 NOTE — TELEPHONE ENCOUNTER
----- Message from Diamone Speed sent at 4/8/2024  3:24 PM CDT -----  Regarding: se;lf  Type: Patient Call Back       Who called:self      What is the request in detail:       Can the clinic reply by [facility]? Pt is needing a call back o go over her medication         Would the patient rather a call back or a response via [facility]?b call back       Best call back number:[phone]

## 2024-04-08 NOTE — TELEPHONE ENCOUNTER
Spoke with pt, she stated she wants to be seen for refill on eyedrops. Scheduled appt as requested.

## 2024-04-11 ENCOUNTER — OFFICE VISIT (OUTPATIENT)
Dept: INTERNAL MEDICINE | Facility: CLINIC | Age: 79
End: 2024-04-11
Payer: MEDICARE

## 2024-04-11 VITALS
BODY MASS INDEX: 25.7 KG/M2 | TEMPERATURE: 98 F | OXYGEN SATURATION: 97 % | HEART RATE: 83 BPM | SYSTOLIC BLOOD PRESSURE: 108 MMHG | WEIGHT: 150.56 LBS | HEIGHT: 64 IN | DIASTOLIC BLOOD PRESSURE: 76 MMHG

## 2024-04-11 DIAGNOSIS — M79.604 RIGHT LEG PAIN: Primary | ICD-10-CM

## 2024-04-11 DIAGNOSIS — M81.0 OSTEOPOROSIS, UNSPECIFIED OSTEOPOROSIS TYPE, UNSPECIFIED PATHOLOGICAL FRACTURE PRESENCE: ICD-10-CM

## 2024-04-11 DIAGNOSIS — R05.3 CHRONIC COUGH: ICD-10-CM

## 2024-04-11 DIAGNOSIS — Z78.0 POSTMENOPAUSAL: ICD-10-CM

## 2024-04-11 DIAGNOSIS — Z13.6 SCREENING FOR CARDIOVASCULAR CONDITION: ICD-10-CM

## 2024-04-11 DIAGNOSIS — H02.729 EYELASH SCANTINESS: ICD-10-CM

## 2024-04-11 PROCEDURE — 3288F FALL RISK ASSESSMENT DOCD: CPT | Mod: CPTII,S$GLB,, | Performed by: FAMILY MEDICINE

## 2024-04-11 PROCEDURE — 3078F DIAST BP <80 MM HG: CPT | Mod: CPTII,S$GLB,, | Performed by: FAMILY MEDICINE

## 2024-04-11 PROCEDURE — 1101F PT FALLS ASSESS-DOCD LE1/YR: CPT | Mod: CPTII,S$GLB,, | Performed by: FAMILY MEDICINE

## 2024-04-11 PROCEDURE — 99214 OFFICE O/P EST MOD 30 MIN: CPT | Mod: S$GLB,,, | Performed by: FAMILY MEDICINE

## 2024-04-11 PROCEDURE — 1159F MED LIST DOCD IN RCRD: CPT | Mod: CPTII,S$GLB,, | Performed by: FAMILY MEDICINE

## 2024-04-11 PROCEDURE — 3074F SYST BP LT 130 MM HG: CPT | Mod: CPTII,S$GLB,, | Performed by: FAMILY MEDICINE

## 2024-04-11 PROCEDURE — 99999 PR PBB SHADOW E&M-EST. PATIENT-LVL V: CPT | Mod: PBBFAC,,, | Performed by: FAMILY MEDICINE

## 2024-04-11 PROCEDURE — 1126F AMNT PAIN NOTED NONE PRSNT: CPT | Mod: CPTII,S$GLB,, | Performed by: FAMILY MEDICINE

## 2024-04-11 RX ORDER — BIMATOPROST 3 UG/ML
SOLUTION TOPICAL
Qty: 5 ML | Refills: 11 | Status: SHIPPED | OUTPATIENT
Start: 2024-04-11

## 2024-04-12 NOTE — PROGRESS NOTES
Subjective:      Patient ID: Jannet Hayes is a 78 y.o. female.    Chief Complaint: Medication Refill      Patient here for routine follow up.   Requesting refill on latisse drops - has scanty eyelashes and used it in the past with benefit.  Reports pain in right lateral upper leg, positional, aching, when she tries to lie on her side at night will ache all the way down to lateral right knee. No recent injury or trauma to the leg or back.   Reports also continued chronic cough - has had for a few years now.   Due for DEXA - known osteoporosis, does not want to take bisphosphonate but wants to see how severe it is so she will know to be careful when playing with her grandchildren.  She is scheduled for repeat colonoscopy next week.    Medication Refill  Associated symptoms include coughing. Pertinent negatives include no abdominal pain, chest pain or congestion.     Review of Systems   Constitutional:  Negative for activity change and appetite change.   HENT:  Negative for congestion.    Respiratory:  Positive for cough and wheezing. Negative for shortness of breath.    Cardiovascular:  Negative for chest pain and leg swelling.   Gastrointestinal:  Negative for abdominal pain.     Past Medical History:   Diagnosis Date    Amnesia, global, transient 2019    Chronic constipation     Concussion 2020    History of IBS 2016    Skin cancer     Tremors of nervous system 2020    Vertigo 2020          Past Surgical History:   Procedure Laterality Date    ADENOIDECTOMY      APPENDECTOMY      AUGMENTATION OF BREAST  2008    silicone    COSMETIC SURGERY      EYE SURGERY      LUMBAR LAMINECTOMY WITH SURGICAL REMOVAL OF LESION OF SPINAL CORD BY POSTERIOR APPROACH  1999     Family History   Problem Relation Age of Onset    Diabetes Mother     Stroke Mother     Lymphoma Mother     Arthritis Mother     Cancer Mother     Vision loss Mother     Heart disease Father      Social History     Socioeconomic History  "   Marital status:    Tobacco Use    Smoking status: Never    Smokeless tobacco: Never   Substance and Sexual Activity    Alcohol use: Yes     Alcohol/week: 1.0 standard drink of alcohol     Types: 1 Glasses of wine per week     Comment: occas    Drug use: Never    Sexual activity: Not Currently     Partners: Male     Birth control/protection: Post-menopausal     Review of patient's allergies indicates:   Allergen Reactions    Oxycodone-acetaminophen Anaphylaxis     HALLUCINATIONS    Penicillins Hives     ITCHING, SWELLING.  Patient states "can take Keflex without any problems".  Other reaction(s): hives, swelling     Codeine Other (See Comments)     hallucinations    Erythromycin Other (See Comments)     "odd thinking patterns, black dots in front of eyes"...."mycins"       Objective:       /76 (BP Location: Left arm, Patient Position: Sitting, BP Method: Medium (Manual))   Pulse 83   Temp 98.2 °F (36.8 °C) (Tympanic)   Ht 5' 4" (1.626 m)   Wt 68.3 kg (150 lb 9.2 oz)   SpO2 97%   BMI 25.85 kg/m²   Physical Exam  Vitals reviewed.   Constitutional:       General: She is not in acute distress.     Appearance: Normal appearance. She is well-developed. She is not ill-appearing or diaphoretic.   HENT:      Head: Normocephalic and atraumatic.      Right Ear: Hearing, tympanic membrane, ear canal and external ear normal.      Left Ear: Hearing, tympanic membrane, ear canal and external ear normal.      Nose: Nose normal.      Mouth/Throat:      Pharynx: Uvula midline. No oropharyngeal exudate.   Eyes:      Conjunctiva/sclera: Conjunctivae normal.      Pupils: Pupils are equal, round, and reactive to light.   Neck:      Thyroid: No thyromegaly.      Trachea: No tracheal deviation.   Cardiovascular:      Rate and Rhythm: Normal rate and regular rhythm.      Heart sounds: Normal heart sounds. No murmur heard.  Pulmonary:      Effort: Pulmonary effort is normal. No respiratory distress.      Breath " sounds: Normal breath sounds.   Abdominal:      General: Bowel sounds are normal.      Palpations: Abdomen is soft.      Tenderness: There is no abdominal tenderness. There is no guarding.      Hernia: No hernia is present.   Musculoskeletal:         General: No swelling or tenderness. Normal range of motion.      Cervical back: Normal range of motion and neck supple.      Right lower leg: No edema.      Left lower leg: No edema.   Lymphadenopathy:      Cervical: No cervical adenopathy.   Skin:     General: Skin is warm and dry.      Capillary Refill: Capillary refill takes less than 2 seconds.   Neurological:      General: No focal deficit present.      Mental Status: She is alert and oriented to person, place, and time.   Psychiatric:         Mood and Affect: Mood normal.         Behavior: Behavior normal.         Thought Content: Thought content normal.         Judgment: Judgment normal.     Assessment:     1. Right leg pain    2. Postmenopausal    3. Chronic cough    4. Osteoporosis, unspecified osteoporosis type, unspecified pathological fracture presence    5. Screening for cardiovascular condition    6. Eyelash scantiness      Plan:   Right leg pain  -     X-Ray Hip 2 or 3 views Right (with Pelvis when performed); Future; Expected date: 04/11/2024  -     X-Ray Lumbar Spine 5 View; Future; Expected date: 04/11/2024    Postmenopausal  -     DXA Bone Density Axial Skeleton 1 or more sites; Future; Expected date: 04/11/2024  -     Comprehensive Metabolic Panel; Future; Expected date: 04/11/2024  -     CBC Auto Differential; Future; Expected date: 04/11/2024  -     Lipid Panel; Future; Expected date: 04/11/2024  -     Vitamin D; Future; Expected date: 04/11/2024    Chronic cough  -     X-Ray Chest PA And Lateral; Future; Expected date: 04/11/2024  -     Complete PFT w/ bronchodilator; Future  -     Comprehensive Metabolic Panel; Future; Expected date: 04/11/2024  -     CBC Auto Differential; Future; Expected date:  04/11/2024  -     Lipid Panel; Future; Expected date: 04/11/2024  -     Vitamin D; Future; Expected date: 04/11/2024    Osteoporosis, unspecified osteoporosis type, unspecified pathological fracture presence  -     Vitamin D; Future; Expected date: 04/11/2024    Screening for cardiovascular condition  -     Lipid Panel; Future; Expected date: 04/11/2024    Eyelash scantiness  -     bimatoprost (LATISSE) 0.03 % ophthalmic solution; Place one drop on applicator and apply evenly along the skin of the upper eyelid at base of eyelashes once daily at bedtime; repeat procedure for second eye (use a clean applicator).  Dispense: 5 mL; Refill: 11    Will return for xrays and fasting labs next week  Medication List with Changes/Refills   Current Medications    ACETAMINOPHEN (TYLENOL) 500 MG TABLET    Take 500 mg by mouth as needed.    ACETYLCYSTEINE 600 MG TBIE    Take by mouth.    AMINO ACIDS 15-12.3 GRAM/86.2 GRAM PWPK    Take by mouth.    ANTIARTHRITIC COMBINATION NO.2 900 MG TAB    Take 2 tablets by mouth once.    ASHWAGANDHA ROOT EXTRACT 300 MG CAP    Take by mouth.    CHOLECALCIFEROL, VITAMIN D3, (VITAMIN D3) 25 MCG (1,000 UNIT) CAPSULE    Take by mouth.    COD LIVER OIL ORAL    Take 1,000 mg by mouth once daily at 6am.    DHA-PHOSPHATIDYLSERINE ORAL    Take 300 capsules by mouth.    ESTRADIOL (VIVELLE-DOT) 0.075 MG/24 HR    Place onto the skin.    ESTRADIOL (VIVELLE-DOT) 0.075 MG/24 HR    Place onto the skin.    ESTRADIOL 0.05 MG/24 HR TD PTSW (VIVELLE-DOT) 0.05 MG/24 HR    Place onto the skin.    MAGNESIUM HYDROXIDE 1,200 MG CHEW    Take by mouth.    MAGNESIUM HYDROXIDE 400 MG/5 ML (MILK OF MAGNESIA) 400 MG/5 ML SUSP    Take 30 mLs by mouth once daily.    MULTIVIT WITH MINERALS/LUTEIN (MULTIVITAMIN 50 PLUS ORAL)    Take 1 tablet by mouth.    NON FORMULARY MEDICATION    1 tablet.    OMEGA 3-DHA-EPA-FISH -150-750 MG CAP    Take 1 capsule by mouth.    OMEGA-3 FATTY ACIDS-FISH -1,000 MG CAP    Take 1,000 mg  by mouth once daily at 6am.    PHOSPHATIDYL SERINE, BULK, MISC    300 mg by Misc.(Non-Drug; Combo Route) route once daily.    PROGESTERONE (PROMETRIUM) 100 MG CAPSULE    Take 200 mg by mouth nightly.    TANNIC/CHESTNUT/PEPPERMINT (ATRANTIL ORAL)    Take 550 mg by mouth once daily at 6am.    TAURINE 1,000 MG CAP    Take 1,000 mg by mouth once daily.    TURMERIC 400 MG CAP    Take by mouth.    TURMERIC ROOT EXTRACT 1,053 MG TAB    Take 1 tablet by mouth.    VIT B COMPLEX 100 NO.2/HERBS (VITAMIN B COMPLEX 100  2-HERBS ORAL)    Take 1 tablet by mouth.    VITAMIN D (VITAMIN D3) 1000 UNITS TAB    Take by mouth.   Changed and/or Refilled Medications    Modified Medication Previous Medication    BIMATOPROST (LATISSE) 0.03 % OPHTHALMIC SOLUTION bimatoprost (LATISSE) 0.03 % ophthalmic solution       Place one drop on applicator and apply evenly along the skin of the upper eyelid at base of eyelashes once daily at bedtime; repeat procedure for second eye (use a clean applicator).    Place one drop on applicator and apply evenly along the skin of the upper eyelid at base of eyelashes once daily at bedtime; repeat procedure for second eye (use a clean applicator).

## 2024-05-06 ENCOUNTER — HOSPITAL ENCOUNTER (OUTPATIENT)
Dept: RADIOLOGY | Facility: HOSPITAL | Age: 79
Discharge: HOME | End: 2024-05-06
Attending: FAMILY MEDICINE
Payer: MEDICARE

## 2024-05-06 DIAGNOSIS — M79.604 RIGHT LEG PAIN: ICD-10-CM

## 2024-05-06 DIAGNOSIS — R05.3 CHRONIC COUGH: ICD-10-CM

## 2024-05-06 PROCEDURE — 71046 X-RAY EXAM CHEST 2 VIEWS: CPT | Mod: TC

## 2024-05-06 PROCEDURE — 73502 X-RAY EXAM HIP UNI 2-3 VIEWS: CPT | Mod: 26,RT,, | Performed by: RADIOLOGY

## 2024-05-06 PROCEDURE — 71046 X-RAY EXAM CHEST 2 VIEWS: CPT | Mod: 26,,, | Performed by: RADIOLOGY

## 2024-05-06 PROCEDURE — 72100 X-RAY EXAM L-S SPINE 2/3 VWS: CPT | Mod: 26,,, | Performed by: RADIOLOGY

## 2024-05-06 PROCEDURE — 73502 X-RAY EXAM HIP UNI 2-3 VIEWS: CPT | Mod: TC,RT

## 2024-05-06 PROCEDURE — 72100 X-RAY EXAM L-S SPINE 2/3 VWS: CPT | Mod: TC

## 2024-05-08 ENCOUNTER — TELEPHONE (OUTPATIENT)
Dept: INTERNAL MEDICINE | Facility: CLINIC | Age: 79
End: 2024-05-08
Payer: MEDICARE

## 2024-05-08 DIAGNOSIS — M54.16 LUMBAR RADICULOPATHY: ICD-10-CM

## 2024-05-08 DIAGNOSIS — M54.50 LUMBAR BACK PAIN: Primary | ICD-10-CM

## 2024-05-08 NOTE — TELEPHONE ENCOUNTER
----- Message from González Aburto MD sent at 5/7/2024  4:52 PM CDT -----  Notify her chest x-ray looks normal.  I believe the pain in her right leg is coming from her back instead of her hip-the back does show significant arthritis and degenerative changes.  Is she interested in seeing a spine specialist or trying physical therapy?  Her bone density test did show osteopenia, almost osteoporosis.  I do not have her previous test to compare

## 2024-05-08 NOTE — TELEPHONE ENCOUNTER
Patient notified referral has been placed and they will be giving her a call to get that scheduled.  Patient verbally understands.

## 2024-05-08 NOTE — TELEPHONE ENCOUNTER
Patient notified of results.  Verbally understands. Patient stated she is interested in seeing a spine specialist.

## 2024-05-28 ENCOUNTER — TELEPHONE (OUTPATIENT)
Dept: INTERNAL MEDICINE | Facility: CLINIC | Age: 79
End: 2024-05-28
Payer: MEDICARE

## 2024-05-28 ENCOUNTER — TELEPHONE (OUTPATIENT)
Dept: NEUROSURGERY | Facility: CLINIC | Age: 79
End: 2024-05-28
Payer: MEDICARE

## 2024-05-28 NOTE — TELEPHONE ENCOUNTER
----- Message from Isha Ardon RN sent at 5/28/2024  2:10 PM CDT -----  Good afternoon,    This patient is in our Neurosurgery WQ for a NP appointment. Dr. العلي requires imaging (MRI or CT of area in question) no older than 1 year. No imaging noted for this patient. Can you all please assist in getting imaging ordered for patient and letting her know?    Thanks,    Isha Ardon RN  RN Navigator  [facility] [place]

## 2024-05-28 NOTE — TELEPHONE ENCOUNTER
Patient will NRSX referral. New patient with none of the required imaging for new patient consultation. Messaged staff of ordering provider, Dr. VARUN Aburto for possible imaging orders and to notify patient.  RD

## 2024-07-31 ENCOUNTER — PATIENT MESSAGE (OUTPATIENT)
Dept: RESEARCH | Facility: HOSPITAL | Age: 79
End: 2024-07-31
Payer: MEDICARE

## 2024-10-29 DIAGNOSIS — Z00.00 ENCOUNTER FOR MEDICARE ANNUAL WELLNESS EXAM: ICD-10-CM

## 2025-01-12 ENCOUNTER — HOSPITAL ENCOUNTER (EMERGENCY)
Facility: HOSPITAL | Age: 80
Discharge: HOME | End: 2025-01-12
Attending: EMERGENCY MEDICINE

## 2025-01-12 VITALS
DIASTOLIC BLOOD PRESSURE: 70 MMHG | TEMPERATURE: 98 F | HEART RATE: 104 BPM | SYSTOLIC BLOOD PRESSURE: 156 MMHG | BODY MASS INDEX: 25.51 KG/M2 | RESPIRATION RATE: 19 BRPM | WEIGHT: 148.63 LBS | OXYGEN SATURATION: 97 %

## 2025-01-12 DIAGNOSIS — R00.0 TACHYCARDIA: ICD-10-CM

## 2025-01-12 LAB
ALBUMIN SERPL BCP-MCNC: 4 G/DL (ref 3.5–5.2)
ALP SERPL-CCNC: 59 U/L (ref 40–150)
ALT SERPL W/O P-5'-P-CCNC: 15 U/L (ref 10–44)
ANION GAP SERPL CALC-SCNC: 9 MMOL/L (ref 8–16)
AST SERPL-CCNC: 20 U/L (ref 10–40)
BASOPHILS # BLD AUTO: 0.11 K/UL (ref 0–0.2)
BASOPHILS NFR BLD: 1.1 % (ref 0–1.9)
BILIRUB SERPL-MCNC: 0.2 MG/DL (ref 0.1–1)
BNP SERPL-MCNC: <10 PG/ML (ref 0–99)
BUN SERPL-MCNC: 14 MG/DL (ref 8–23)
CALCIUM SERPL-MCNC: 10 MG/DL (ref 8.7–10.5)
CHLORIDE SERPL-SCNC: 105 MMOL/L (ref 95–110)
CO2 SERPL-SCNC: 26 MMOL/L (ref 23–29)
CREAT SERPL-MCNC: 0.9 MG/DL (ref 0.5–1.4)
D DIMER PPP IA.FEU-MCNC: 0.42 MG/L FEU
DIFFERENTIAL METHOD BLD: ABNORMAL
EOSINOPHIL # BLD AUTO: 0.3 K/UL (ref 0–0.5)
EOSINOPHIL NFR BLD: 3.4 % (ref 0–8)
ERYTHROCYTE [DISTWIDTH] IN BLOOD BY AUTOMATED COUNT: 14.1 % (ref 11.5–14.5)
EST. GFR  (NO RACE VARIABLE): >60 ML/MIN/1.73 M^2
GLUCOSE SERPL-MCNC: 139 MG/DL (ref 70–110)
HCT VFR BLD AUTO: 45.3 % (ref 37–48.5)
HGB BLD-MCNC: 14.8 G/DL (ref 12–16)
IMM GRANULOCYTES # BLD AUTO: 0.06 K/UL (ref 0–0.04)
IMM GRANULOCYTES NFR BLD AUTO: 0.6 % (ref 0–0.5)
LYMPHOCYTES # BLD AUTO: 1.9 K/UL (ref 1–4.8)
LYMPHOCYTES NFR BLD: 19.6 % (ref 18–48)
MCH RBC QN AUTO: 30.7 PG (ref 27–31)
MCHC RBC AUTO-ENTMCNC: 32.7 G/DL (ref 32–36)
MCV RBC AUTO: 94 FL (ref 82–98)
MONOCYTES # BLD AUTO: 0.9 K/UL (ref 0.3–1)
MONOCYTES NFR BLD: 9.7 % (ref 4–15)
NEUTROPHILS # BLD AUTO: 6.3 K/UL (ref 1.8–7.7)
NEUTROPHILS NFR BLD: 65.6 % (ref 38–73)
NRBC BLD-RTO: 0 /100 WBC
PLATELET # BLD AUTO: 355 K/UL (ref 150–450)
PMV BLD AUTO: 9.5 FL (ref 9.2–12.9)
POCT GLUCOSE: 108 MG/DL (ref 70–110)
POTASSIUM SERPL-SCNC: 3.8 MMOL/L (ref 3.5–5.1)
PROT SERPL-MCNC: 7.4 G/DL (ref 6–8.4)
RBC # BLD AUTO: 4.82 M/UL (ref 4–5.4)
SODIUM SERPL-SCNC: 140 MMOL/L (ref 136–145)
TROPONIN I SERPL DL<=0.01 NG/ML-MCNC: <0.006 NG/ML (ref 0–0.03)
TSH SERPL DL<=0.005 MIU/L-ACNC: 2.61 UIU/ML (ref 0.4–4)
WBC # BLD AUTO: 9.65 K/UL (ref 3.9–12.7)

## 2025-01-12 PROCEDURE — 93010 ELECTROCARDIOGRAM REPORT: CPT | Mod: ,,, | Performed by: INTERNAL MEDICINE

## 2025-01-12 PROCEDURE — 85379 FIBRIN DEGRADATION QUANT: CPT | Performed by: EMERGENCY MEDICINE

## 2025-01-12 PROCEDURE — 93005 ELECTROCARDIOGRAM TRACING: CPT

## 2025-01-12 PROCEDURE — 84484 ASSAY OF TROPONIN QUANT: CPT

## 2025-01-12 PROCEDURE — 80053 COMPREHEN METABOLIC PANEL: CPT

## 2025-01-12 PROCEDURE — 25000003 PHARM REV CODE 250: Performed by: EMERGENCY MEDICINE

## 2025-01-12 PROCEDURE — 84443 ASSAY THYROID STIM HORMONE: CPT

## 2025-01-12 PROCEDURE — 85025 COMPLETE CBC W/AUTO DIFF WBC: CPT

## 2025-01-12 PROCEDURE — 83880 ASSAY OF NATRIURETIC PEPTIDE: CPT

## 2025-01-12 PROCEDURE — 36415 COLL VENOUS BLD VENIPUNCTURE: CPT

## 2025-01-12 PROCEDURE — 99285 EMERGENCY DEPT VISIT HI MDM: CPT | Mod: 25

## 2025-01-12 PROCEDURE — 82962 GLUCOSE BLOOD TEST: CPT

## 2025-01-12 RX ORDER — PROPRANOLOL HYDROCHLORIDE 10 MG/1
10 TABLET ORAL DAILY
Qty: 30 TABLET | Refills: 0 | Status: SHIPPED | OUTPATIENT
Start: 2025-01-12 | End: 2025-01-16 | Stop reason: SDUPTHER

## 2025-01-12 RX ORDER — METOPROLOL TARTRATE 1 MG/ML
5 INJECTION, SOLUTION INTRAVENOUS
Status: DISCONTINUED | OUTPATIENT
Start: 2025-01-12 | End: 2025-01-12 | Stop reason: HOSPADM

## 2025-01-12 RX ORDER — PROPRANOLOL HYDROCHLORIDE 10 MG/1
10 TABLET ORAL
Status: COMPLETED | OUTPATIENT
Start: 2025-01-12 | End: 2025-01-12

## 2025-01-12 RX ADMIN — PROPRANOLOL HYDROCHLORIDE 10 MG: 10 TABLET ORAL at 08:01

## 2025-01-12 NOTE — FIRST PROVIDER EVALUATION
Medical screening examination initiated.  I have conducted a focused provider triage encounter, findings are as follows:    Brief history of present illness:  Patient reports brief episode of tachycardia shortness for breath about 1 hour prior to arrival.  Reports that she checked her pulse with a pulse ox at home and is in the 170s.  Denies any other symptoms    Vitals:    01/12/25 1523   BP: (!) 142/66   BP Location: Right arm   Pulse: 106   Resp: 18   Temp: 98.3 °F (36.8 °C)   TempSrc: Oral   SpO2: 95%   Weight: 67.4 kg (148 lb 9.6 oz)       Pertinent physical exam:  Tachycardia    Brief workup plan:  Workup    Preliminary workup initiated; this workup will be continued and followed by the physician or advanced practice provider that is assigned to the patient when roomed.

## 2025-01-12 NOTE — ED PROVIDER NOTES
"SCRIBE #1 NOTE: I, Malachi Wick, am scribing for, and in the presence of, Alma Delia Tejada DO. I have scribed the entire note.       History     Chief Complaint   Patient presents with    Tachycardia     Pt. Reports a brief period of tachycardia while getting dressed about a half hour ago. Symptoms resolved PTA. Denies chest pain.      Review of patient's allergies indicates:   Allergen Reactions    Oxycodone-acetaminophen Anaphylaxis     HALLUCINATIONS    Penicillins Hives     ITCHING, SWELLING.  Patient states "can take Keflex without any problems".  Other reaction(s): hives, swelling     Codeine Other (See Comments)     hallucinations    Erythromycin Other (See Comments)     "odd thinking patterns, black dots in front of eyes"...."mycins"         History of Present Illness     HPI    1/12/2025, 5:47 PM  History obtained from the patient      History of Present Illness: Jannet Hayes is a 79 y.o. female patient with a PMHx of skin cancer, IBS, and transient global amnesia who presents to the Emergency Department for evaluation of elevated HR which onset suddenly PTA. Pt reports that she could "hear heart beating" her ears, which prompted the pt to take her HR, which was 176.  She also reports that her blood pressure was elevated.  Pt reports that she had the urge to pass 2-3 bowel movements today which is unusual. Pt sees Dr. Mcdermott (Cardiology). Pt is not on any prescribed medications. Symptoms have resolved spontaneously.  No mitigating or exacerbating factors reported. Associated sxs include dizziness, lightheadedness, palpitations, and SOB. Patient denies any N/V, abd pain, back pain, fever, fatigue, vision changes, CP, slurred speech, facial droop, weakness, and all other sxs at this time. No prior Tx. No further complaints or concerns at this time.       Arrival mode: Personal vehicle     PCP: González Aburto MD        Past Medical History:  Past Medical History:   Diagnosis Date    Amnesia, " global, transient 2019    Chronic constipation     Concussion 2020    History of IBS 2016    Skin cancer     Tremors of nervous system 2020    Vertigo 2020       Past Surgical History:  Past Surgical History:   Procedure Laterality Date    ADENOIDECTOMY      APPENDECTOMY      AUGMENTATION OF BREAST  2008    silicone    COSMETIC SURGERY      EYE SURGERY      LUMBAR LAMINECTOMY WITH SURGICAL REMOVAL OF LESION OF SPINAL CORD BY POSTERIOR APPROACH  1999         Family History:  Family History   Problem Relation Name Age of Onset    Diabetes Mother Roz     Stroke Mother Roz     Lymphoma Mother Roz     Arthritis Mother Roz     Cancer Mother Roz     Vision loss Mother Roz     Heart disease Father Darron        Social History:  Social History     Tobacco Use    Smoking status: Never    Smokeless tobacco: Never   Substance and Sexual Activity    Alcohol use: Yes     Alcohol/week: 1.0 standard drink of alcohol     Types: 1 Glasses of wine per week     Comment: occas    Drug use: Never    Sexual activity: Not Currently     Partners: Male     Birth control/protection: Post-menopausal        Review of Systems     Review of Systems   Constitutional:  Negative for fatigue and fever.   Eyes:         (-) vision changes   Respiratory:  Positive for shortness of breath.    Cardiovascular:  Positive for palpitations. Negative for chest pain.        (+) elevated HR  (+) elevated BP   Gastrointestinal:  Negative for abdominal pain, nausea and vomiting.   Musculoskeletal:  Negative for back pain.   Neurological:  Positive for dizziness and light-headedness. Negative for facial asymmetry, speech difficulty, weakness and headaches.        Physical Exam     Initial Vitals [01/12/25 1523]   BP Pulse Resp Temp SpO2   (!) 142/66 106 18 98.3 °F (36.8 °C) 95 %      MAP       --          Physical Exam  Nursing Notes and Vital Signs Reviewed.  Constitutional: Patient is in no acute distress. Well-developed and well-nourished.  Head:  Atraumatic. Normocephalic.  Eyes: PERRL. EOM intact. Conjunctivae are not pale. No scleral icterus.  No nystagmus.  ENT: Mucous membranes are moist. Oropharynx is clear and symmetric.    Neck: Supple. Full ROM.   Cardiovascular: Regular rate. Regular rhythm. No murmurs, rubs, or gallops.   Pulmonary/Chest: No respiratory distress. Clear to auscultation bilaterally. No wheezing or rales.  Abdominal: Soft and non-distended.  There is no tenderness.  No rebound, guarding, or rigidity.   Musculoskeletal: Moves all extremities. No obvious deformities. No edema. No calf tenderness.  Skin: Warm and dry.  Neurological:  Alert, awake, and appropriate.  Normal speech.  No ataxia.  Normal gait.  No pronator drift.  5/5 strength to all extremities. NIHSS = 0. Cranial nerves II-XII intact. No acute focal neurological deficits are appreciated.  Psychiatric: Normal affect. Good eye contact. Appropriate in content.     ED Course   Procedures  ED Vital Signs:  Vitals:    01/12/25 1523 01/12/25 1802 01/12/25 1854 01/12/25 1902   BP: (!) 142/66 (!) 156/77 (!) 162/72 (!) 158/72   Pulse: 106 92 101 98   Resp: 18 18 15 19   Temp: 98.3 °F (36.8 °C)      TempSrc: Oral      SpO2: 95% 99% 98% 99%   Weight: 67.4 kg (148 lb 9.6 oz)       01/12/25 1911 01/12/25 1922 01/12/25 1932 01/12/25 1939   BP: (!) 137/58 (!) 146/65 (!) 154/67    Pulse: 94 93 98 104   Resp: 16 12 (!) 25 19   Temp:       TempSrc:       SpO2: 98% 97% 98% 97%   Weight:        01/12/25 1950 01/12/25 1952 01/12/25 2025 01/12/25 2028   BP: (!) 166/74 (!) 155/73 (!) 156/69 (!) 156/69   Pulse:       Resp:       Temp:       TempSrc:       SpO2:       Weight:        01/12/25 2033   BP: (!) 156/70   Pulse:    Resp:    Temp:    TempSrc:    SpO2:    Weight:        Abnormal Lab Results:  Labs Reviewed   CBC W/ AUTO DIFFERENTIAL - Abnormal       Result Value    WBC 9.65      RBC 4.82      Hemoglobin 14.8      Hematocrit 45.3      MCV 94      MCH 30.7      MCHC 32.7      RDW 14.1       Platelets 355      MPV 9.5      Immature Granulocytes 0.6 (*)     Gran # (ANC) 6.3      Immature Grans (Abs) 0.06 (*)     Lymph # 1.9      Mono # 0.9      Eos # 0.3      Baso # 0.11      nRBC 0      Gran % 65.6      Lymph % 19.6      Mono % 9.7      Eosinophil % 3.4      Basophil % 1.1      Differential Method Automated     COMPREHENSIVE METABOLIC PANEL - Abnormal    Sodium 140      Potassium 3.8      Chloride 105      CO2 26      Glucose 139 (*)     BUN 14      Creatinine 0.9      Calcium 10.0      Total Protein 7.4      Albumin 4.0      Total Bilirubin 0.2      Alkaline Phosphatase 59      AST 20      ALT 15      eGFR >60      Anion Gap 9     B-TYPE NATRIURETIC PEPTIDE    BNP <10     TROPONIN I    Troponin I <0.006     TSH   D DIMER, QUANTITATIVE   D DIMER, QUANTITATIVE    D-Dimer 0.42     TSH    TSH 2.610     POCT GLUCOSE    POCT Glucose 108          All Lab Results:  Results for orders placed or performed during the hospital encounter of 01/12/25   EKG 12-lead    Collection Time: 01/12/25  3:22 PM   Result Value Ref Range    QRS Duration 78 ms    [facility] QTC Calculation 419 ms   CBC auto differential    Collection Time: 01/12/25  4:24 PM   Result Value Ref Range    WBC 9.65 3.90 - 12.70 K/uL    RBC 4.82 4.00 - 5.40 M/uL    Hemoglobin 14.8 12.0 - 16.0 g/dL    Hematocrit 45.3 37.0 - 48.5 %    MCV 94 82 - 98 fL    MCH 30.7 27.0 - 31.0 pg    MCHC 32.7 32.0 - 36.0 g/dL    RDW 14.1 11.5 - 14.5 %    Platelets 355 150 - 450 K/uL    MPV 9.5 9.2 - 12.9 fL    Immature Granulocytes 0.6 (H) 0.0 - 0.5 %    Gran # (ANC) 6.3 1.8 - 7.7 K/uL    Immature Grans (Abs) 0.06 (H) 0.00 - 0.04 K/uL    Lymph # 1.9 1.0 - 4.8 K/uL    Mono # 0.9 0.3 - 1.0 K/uL    Eos # 0.3 0.0 - 0.5 K/uL    Baso # 0.11 0.00 - 0.20 K/uL    nRBC 0 0 /100 WBC    Gran % 65.6 38.0 - 73.0 %    Lymph % 19.6 18.0 - 48.0 %    Mono % 9.7 4.0 - 15.0 %    Eosinophil % 3.4 0.0 - 8.0 %    Basophil % 1.1 0.0 - 1.9 %    Differential Method Automated    Comprehensive metabolic  panel    Collection Time: 01/12/25  4:24 PM   Result Value Ref Range    Sodium 140 136 - 145 mmol/L    Potassium 3.8 3.5 - 5.1 mmol/L    Chloride 105 95 - 110 mmol/L    CO2 26 23 - 29 mmol/L    Glucose 139 (H) 70 - 110 mg/dL    BUN 14 8 - 23 mg/dL    Creatinine 0.9 0.5 - 1.4 mg/dL    Calcium 10.0 8.7 - 10.5 mg/dL    Total Protein 7.4 6.0 - 8.4 g/dL    Albumin 4.0 3.5 - 5.2 g/dL    Total Bilirubin 0.2 0.1 - 1.0 mg/dL    Alkaline Phosphatase 59 40 - 150 U/L    AST 20 10 - 40 U/L    ALT 15 10 - 44 U/L    eGFR >60 >60 mL/min/1.73 m^2    Anion Gap 9 8 - 16 mmol/L   Brain natriuretic peptide    Collection Time: 01/12/25  4:24 PM   Result Value Ref Range    BNP <10 0 - 99 pg/mL   Troponin I    Collection Time: 01/12/25  4:24 PM   Result Value Ref Range    Troponin I <0.006 0.000 - 0.026 ng/mL   TSH    Collection Time: 01/12/25  4:24 PM   Result Value Ref Range    TSH 2.610 0.400 - 4.000 uIU/mL   D dimer, quantitative    Collection Time: 01/12/25  5:48 PM   Result Value Ref Range    D-Dimer 0.42 <0.50 mg/L FEU   POCT glucose    Collection Time: 01/12/25  8:21 PM   Result Value Ref Range    POCT Glucose 108 70 - 110 mg/dL         Imaging Results:  Imaging Results              X-Ray Chest AP Portable (Final result)  Result time 01/12/25 15:38:00      Final result by Eitan Hollingsworth MD (01/12/25 15:38:00)                   Impression:      No acute abnormality.      Electronically signed by: Eitan Hollingsworth  Date:    01/12/2025  Time:    15:38               Narrative:    EXAMINATION:  XR CHEST AP PORTABLE    CLINICAL HISTORY:  Tachycardia, unspecified    TECHNIQUE:  Single frontal view of the chest was performed.    COMPARISON:  None    FINDINGS:  The lungs are clear, with normal appearance of pulmonary vasculature and no pleural effusion or pneumothorax.    The cardiac silhouette is normal in size. The hilar and mediastinal contours are unremarkable.    Bones are intact.                                       The EKG was  ordered, reviewed, and independently interpreted by the ED provider.  Interpretation time: 15:22  Rate: 102 BPM  Rhythm: sinus tachycardia  Interpretation: Cannot rule out anterior infarct, age undetermined. No STEMI.             The Emergency Provider reviewed the vital signs and test results, which are outlined above.     ED Discussion       6:49 PM: Reassessed pt at this time.  Discussed with pt all pertinent ED information and results. Discussed pt dx and plan of tx. Gave pt all f/u and return to the ED instructions. All questions and concerns were addressed at this time. Pt expresses understanding of information and instructions, and is comfortable with plan to discharge. Pt is stable for discharge.    I discussed with patient and/or family/caretaker that evaluation in the ED does not suggest any emergent or life threatening medical conditions requiring immediate intervention beyond what was provided in the ED, and I believe patient is safe for discharge.  Regardless, an unremarkable evaluation in the ED does not preclude the development or presence of a serious of life threatening condition. As such, patient was instructed to return immediately for any worsening or change in current symptoms.      ED Course as of 01/14/25 1329   Sun Jan 12, 2025 2028 Glucose 108 [NF]   e Jan 14, 2025   1300 79-year-old female presents with elevated heart rate and blood pressure.  I suspect NSSVT and/or PAF with RVR.  No evidence of either of these arrhythmias in the emergency department.  She exhibits sinus tachycardia and normal sinus rhythm only.  Troponin, TSH, D-dimer, BNP, CBC, and CMP are all normal.  Chest x-ray is unremarkable.  Patient has had fluctuations in her blood pressure and heart rate but has no signs of end-organ dysfunction or arrhythmia requiring medication administration.  Instructed to follow up with her cardiologist next week.  I will start on a low dose of propranolol for suspected SVT. [NF]      ED  Course User Index  [NF] Alma Delia Tejada, DO     Medical Decision Making  79-year-old female presents with elevated heart rate and blood pressure.  I suspect NSSVT and/or PAF with RVR.  No evidence of either of these arrhythmias in the emergency department.  She exhibits sinus tachycardia and normal sinus rhythm only.  Troponin, TSH, D-dimer, BNP, CBC, and CMP are all normal.  Chest x-ray is unremarkable.  Patient has had fluctuations in her blood pressure and heart rate but has no signs of end-organ dysfunction or arrhythmia requiring medication administration.  Instructed to follow up with her cardiologist next week.  I will start on a low dose of propranolol for suspected SVT.      Amount and/or Complexity of Data Reviewed  Labs: ordered. Decision-making details documented in ED Course.  Radiology: ordered. Decision-making details documented in ED Course.  ECG/medicine tests: ordered and independent interpretation performed. Decision-making details documented in ED Course.    Risk  Prescription drug management.       Additional MDM:   Differential Diagnosis:   NSSVT, PAF with RVR, PE, electrolyte derangement, thyroid abnormality, anemia, ACS       NIH Stroke Scale:   Interval = baseline (upon arrival/admit)  Level of consciousness = 0 - alert  LOC questions = 0 - answers both correctly  LOC commands = 0 - performs both correctly  Best gaze = 0 - normal  Visual = 0 - no visual loss  Facial palsy = 0 - normal  Motor left arm =  0 - no drift  Motor right arm =  0 - no drift  Motor left leg = 0 - no drift  Motor right leg =  0 - no drift  Limb ataxia = 0 - absent  Sensory = 0 - normal  Best language = 0 - no aphasia  Dysarthria = 0 - normal articulation  Extinction and inattention = 0 - no neglect  NIH Stroke Scale Total = 0              ED Medication(s):  Medications   propranoloL tablet 10 mg (10 mg Oral Given 1/12/25 2025)       Discharge Medication List as of 1/12/2025  7:25 PM        START taking these  medications    Details   propranoloL (INDERAL) 10 MG tablet Take 1 tablet (10 mg total) by mouth once daily., Starting Sun 1/12/2025, Print              Follow-up Information       Itzel Mcdermott MD On 1/13/2025.    Specialty: Cardiology  Contact information:  [address]  [place] [address] [phone]               [facility] - Emergency Dept..    Specialty: Emergency Medicine  Why: As needed, If symptoms worsen  Contact information:  [address] [facility] [address] [phone]             González Aburto MD In 3 days.    Specialty: Internal Medicine  Contact information:  [address] [facility] [address] [phone]                                 Scribe Attestation:   Scribe #1: I performed the above scribed service and the documentation accurately describes the services I performed. I attest to the accuracy of the note.     Attending:   Physician Attestation Statement for Scribe #1: I, Alma Delia Tejada DO, personally performed the services described in this documentation, as scribed by Malachi Wick, in my presence, and it is both accurate and complete.           Clinical Impression       ICD-10-CM ICD-9-CM   1. Tachycardia  R00.0 785.0       Disposition:   Disposition: Discharged  Condition: Stable        Alma Delia Tejada DO  01/14/25 1329

## 2025-01-12 NOTE — ED NOTES
Bed: 13  Expected date:   Expected time:   Means of arrival: Personal Transportation  Comments:  When clean

## 2025-01-13 ENCOUNTER — TELEPHONE (OUTPATIENT)
Dept: CARDIOLOGY | Facility: CLINIC | Age: 80
End: 2025-01-13
Payer: MEDICARE

## 2025-01-13 LAB
OHS QRS DURATION: 78 MS
OHS QTC CALCULATION: 419 MS

## 2025-01-13 NOTE — ED NOTES
Rechecked Pt's vitals and updated chart. Notified Dr. Tejada and RN Navya of pts new Bp and was instructed to hold metoprolol

## 2025-01-13 NOTE — TELEPHONE ENCOUNTER
Scheduled    ----- Message from Gris sent at 1/13/2025  2:22 PM CST -----  Regarding: Appointment  Contact: patient  Per phone call with patient, she stated that she went to the ER on yesterday and was advised to follow up her physician.  The caller is needing to schedule an appointment to see the physician.  Please return call at [phone] (home).    Thanks,  SJ

## 2025-01-16 ENCOUNTER — OFFICE VISIT (OUTPATIENT)
Dept: CARDIOLOGY | Facility: CLINIC | Age: 80
End: 2025-01-16
Payer: MEDICARE

## 2025-01-16 VITALS
HEART RATE: 87 BPM | DIASTOLIC BLOOD PRESSURE: 76 MMHG | BODY MASS INDEX: 25.49 KG/M2 | OXYGEN SATURATION: 96 % | WEIGHT: 148.5 LBS | SYSTOLIC BLOOD PRESSURE: 123 MMHG

## 2025-01-16 DIAGNOSIS — K58.9 IRRITABLE BOWEL SYNDROME, UNSPECIFIED TYPE: ICD-10-CM

## 2025-01-16 DIAGNOSIS — R09.89 CAROTID BRUIT, UNSPECIFIED LATERALITY: ICD-10-CM

## 2025-01-16 DIAGNOSIS — R06.02 SOB (SHORTNESS OF BREATH): ICD-10-CM

## 2025-01-16 DIAGNOSIS — R07.9 CHEST PAIN, UNSPECIFIED TYPE: Primary | ICD-10-CM

## 2025-01-16 DIAGNOSIS — R00.0 TACHYCARDIA: ICD-10-CM

## 2025-01-16 DIAGNOSIS — R41.3 MEMORY LOSS: ICD-10-CM

## 2025-01-16 DIAGNOSIS — R42 VERTIGO: ICD-10-CM

## 2025-01-16 DIAGNOSIS — R06.09 OTHER FORM OF DYSPNEA: ICD-10-CM

## 2025-01-16 PROCEDURE — 99214 OFFICE O/P EST MOD 30 MIN: CPT | Mod: S$GLB,,, | Performed by: INTERNAL MEDICINE

## 2025-01-16 PROCEDURE — 1125F AMNT PAIN NOTED PAIN PRSNT: CPT | Mod: CPTII,S$GLB,, | Performed by: INTERNAL MEDICINE

## 2025-01-16 PROCEDURE — 1101F PT FALLS ASSESS-DOCD LE1/YR: CPT | Mod: CPTII,S$GLB,, | Performed by: INTERNAL MEDICINE

## 2025-01-16 PROCEDURE — 1159F MED LIST DOCD IN RCRD: CPT | Mod: CPTII,S$GLB,, | Performed by: INTERNAL MEDICINE

## 2025-01-16 PROCEDURE — 3078F DIAST BP <80 MM HG: CPT | Mod: CPTII,S$GLB,, | Performed by: INTERNAL MEDICINE

## 2025-01-16 PROCEDURE — 99999 PR PBB SHADOW E&M-EST. PATIENT-LVL V: CPT | Mod: PBBFAC,,, | Performed by: INTERNAL MEDICINE

## 2025-01-16 PROCEDURE — 1160F RVW MEDS BY RX/DR IN RCRD: CPT | Mod: CPTII,S$GLB,, | Performed by: INTERNAL MEDICINE

## 2025-01-16 PROCEDURE — G2211 COMPLEX E/M VISIT ADD ON: HCPCS | Mod: S$GLB,,, | Performed by: INTERNAL MEDICINE

## 2025-01-16 PROCEDURE — 3288F FALL RISK ASSESSMENT DOCD: CPT | Mod: CPTII,S$GLB,, | Performed by: INTERNAL MEDICINE

## 2025-01-16 PROCEDURE — 3074F SYST BP LT 130 MM HG: CPT | Mod: CPTII,S$GLB,, | Performed by: INTERNAL MEDICINE

## 2025-01-16 RX ORDER — PROPRANOLOL HYDROCHLORIDE 10 MG/1
10 TABLET ORAL 3 TIMES DAILY PRN
Qty: 60 TABLET | Refills: 1 | Status: SHIPPED | OUTPATIENT
Start: 2025-01-16

## 2025-01-16 NOTE — PROGRESS NOTES
"Subjective:   Patient ID:  Jannet Hayes is a 79 y.o. female who presents for cardiac consult of Chest Pain      Referral by: No referring provider defined for this encounter.     Reason for consult:       HPI  The patient came in today for cardiac consult of Chest Pain      Jannet Hayes is a 79 y.o. female h/o vertigo, tremors, irritable bowel syndrome here for CV follow up.       1/12/2025, 5:47 PM  History obtained from the patient                  History of Present Illness: Jannet Hayes is a 79 y.o. female patient with a PMHx of skin cancer, IBS, and transient global amnesia who presents to the Emergency Department for evaluation of elevated HR which onset suddenly PTA. Pt reports that she could "hear heart beating" her ears, which prompted the pt to take her HR, which was 176.  She also reports that her blood pressure was elevated.  Pt reports that she had the urge to pass 2-3 bowel movements today which is unusual. Pt sees Dr. Mcdermott (Cardiology). Pt is not on any prescribed medications. Symptoms have resolved spontaneously.  No mitigating or exacerbating factors reported. Associated sxs include dizziness, lightheadedness, palpitations, and SOB. Patient denies any N/V, abd pain, back pain, fever, fatigue, vision changes, CP, slurred speech, facial droop, weakness, and all other sxs at this time. No prior Tx. No further complaints or concerns at this time.       1/16/25  Pt had recent ER eval for elevated HR, dizziness,presyncope.   Pt seen by Dr. Mcdermott last year here for follow up.     ECHO 6/2022 with normal bi V function, mild TR.   ECG stress test neg for ischemia 6/2022  Vital monitor in 2022 was neg overall.     BP and HR stable today. BMI 25  Her pulse ox at home revealed  bpm. She had mild SOB with talking.     FH - mother - stroke, father - MI    Sinus tachycardia   Cannot rule out Anterior infarct ,age undetermined   Abnormal ECG   When compared with ECG of 20-Mar-2024 10:20, "   No significant change was found   Confirmed by Jimena Hong ([id number]) on 1/13/2025 3:35:36 PM       Results for orders placed during the hospital encounter of 06/27/22    Echo    Interpretation Summary  · The left ventricle is small with concentric remodeling and normal systolic function.  · The estimated ejection fraction is 60%.  · Normal left ventricular diastolic function.  · Normal right ventricular size with normal right ventricular systolic function.  · Mild tricuspid regurgitation.  · Normal central venous pressure (3 mmHg).  · The estimated PA systolic pressure is 23 mmHg.      Results for orders placed during the hospital encounter of 06/27/22    Exercise Stress - EKG    Interpretation Summary    The EKG portion of this study is negative for ischemia.    The patient reported no chest pain during the stress test.    There were no arrhythmias during stress.    The exercise capacity was normal.    Stress ECG: There was hypertensive blood pressure response with stress.      No results found for this or any previous visit.      No cardiac monitor results found for the past 12 months         Past Medical History:   Diagnosis Date    Amnesia, global, transient 2019    Chronic constipation     Concussion 2020    History of IBS 2016    Skin cancer     Tremors of nervous system 2020    Vertigo 2020       Past Surgical History:   Procedure Laterality Date    ADENOIDECTOMY      APPENDECTOMY      AUGMENTATION OF BREAST  2008    silicone    COSMETIC SURGERY      EYE SURGERY      LUMBAR LAMINECTOMY WITH SURGICAL REMOVAL OF LESION OF SPINAL CORD BY POSTERIOR APPROACH  1999       Social History     Tobacco Use    Smoking status: Never    Smokeless tobacco: Never   Substance Use Topics    Alcohol use: Yes     Alcohol/week: 1.0 standard drink of alcohol     Types: 1 Glasses of wine per week     Comment: occas    Drug use: Never       Family History   Problem Relation Name Age of Onset    Diabetes Mother Roz     Stroke  Mother Roz     Lymphoma Mother Roz     Arthritis Mother Roz     Cancer Mother Roz     Vision loss Mother Roz     Heart disease Father Darron        Patient's Medications   New Prescriptions    No medications on file   Previous Medications    ACETAMINOPHEN (TYLENOL) 500 MG TABLET    Take 500 mg by mouth as needed.    ACETYLCYSTEINE 600 MG TBIE    Take by mouth.    AMINO ACIDS 15-12.3 GRAM/86.2 GRAM PWPK    Take by mouth.    ANTIARTHRITIC COMBINATION NO.2 900 MG TAB    Take 2 tablets by mouth once.    ASHWAGANDHA ROOT EXTRACT 300 MG CAP    Take by mouth.    BIMATOPROST (LATISSE) 0.03 % OPHTHALMIC SOLUTION    Place one drop on applicator and apply evenly along the skin of the upper eyelid at base of eyelashes once daily at bedtime; repeat procedure for second eye (use a clean applicator).    CHOLECALCIFEROL, VITAMIN D3, (VITAMIN D3) 25 MCG (1,000 UNIT) CAPSULE    Take by mouth.    COD LIVER OIL ORAL    Take 1,000 mg by mouth once daily at 6am.    DHA-PHOSPHATIDYLSERINE ORAL    Take 300 capsules by mouth.    ESTRADIOL (VIVELLE-DOT) 0.075 MG/24 HR    Place onto the skin.    ESTRADIOL (VIVELLE-DOT) 0.075 MG/24 HR    Place onto the skin.    ESTRADIOL 0.05 MG/24 HR TD PTSW (VIVELLE-DOT) 0.05 MG/24 HR    Place onto the skin.    MAGNESIUM HYDROXIDE 1,200 MG CHEW    Take by mouth.    MAGNESIUM HYDROXIDE 400 MG/5 ML (MILK OF MAGNESIA) 400 MG/5 ML SUSP    Take 30 mLs by mouth once daily.    MULTIVIT WITH MINERALS/LUTEIN (MULTIVITAMIN 50 PLUS ORAL)    Take 1 tablet by mouth.    NON FORMULARY MEDICATION    1 tablet.    OMEGA 3-DHA-EPA-FISH -150-750 MG CAP    Take 1 capsule by mouth.    OMEGA-3 FATTY ACIDS-FISH -1,000 MG CAP    Take 1,000 mg by mouth once daily at 6am.    PHOSPHATIDYL SERINE, BULK, MISC    300 mg by Misc.(Non-Drug; Combo Route) route once daily.    PROGESTERONE (PROMETRIUM) 100 MG CAPSULE    Take 200 mg by mouth nightly.    PROPRANOLOL (INDERAL) 10 MG TABLET    Take 1 tablet (10 mg total)  by mouth once daily.    TANNIC/CHESTNUT/PEPPERMINT (ATRANTIL ORAL)    Take 550 mg by mouth once daily at 6am.    TAURINE 1,000 MG CAP    Take 1,000 mg by mouth once daily.    TURMERIC 400 MG CAP    Take by mouth.    TURMERIC ROOT EXTRACT 1,053 MG TAB    Take 1 tablet by mouth.    VIT B COMPLEX 100 NO.2/HERBS (VITAMIN B COMPLEX 100  2-HERBS ORAL)    Take 1 tablet by mouth.    VITAMIN D (VITAMIN D3) 1000 UNITS TAB    Take by mouth.   Modified Medications    No medications on file   Discontinued Medications    No medications on file       Review of Systems   Constitutional:  Positive for malaise/fatigue.   HENT: Negative.     Eyes: Negative.    Respiratory:  Positive for shortness of breath.    Cardiovascular:  Positive for palpitations.   Gastrointestinal: Negative.    Genitourinary: Negative.    Musculoskeletal: Negative.    Skin: Negative.    Neurological:  Positive for dizziness, focal weakness and weakness.   Endo/Heme/Allergies: Negative.    Psychiatric/Behavioral: Negative.     All 12 systems otherwise negative.      Wt Readings from Last 3 Encounters:   01/16/25 67.4 kg (148 lb 7.7 oz)   01/12/25 67.4 kg (148 lb 9.6 oz)   04/11/24 68.3 kg (150 lb 9.2 oz)     Temp Readings from Last 3 Encounters:   01/12/25 98.3 °F (36.8 °C) (Oral)   04/11/24 98.2 °F (36.8 °C) (Tympanic)   11/06/23 98.5 °F (36.9 °C)     BP Readings from Last 3 Encounters:   01/16/25 123/76   01/12/25 (!) 156/70   04/11/24 108/76     Pulse Readings from Last 3 Encounters:   01/16/25 87   01/12/25 104   04/11/24 83       /76 (BP Location: Left arm, Patient Position: Sitting)   Pulse 87   Wt 67.4 kg (148 lb 7.7 oz)   SpO2 96%   BMI 25.49 kg/m²     Objective:   Physical Exam  Vitals and nursing note reviewed.   Constitutional:       General: She is not in acute distress.     Appearance: She is well-developed. She is not diaphoretic.   HENT:      Head: Normocephalic and atraumatic.      Nose: Nose normal.   Eyes:      General: No scleral  icterus.     Conjunctiva/sclera: Conjunctivae normal.   Neck:      Thyroid: No thyromegaly.      Vascular: No JVD.   Cardiovascular:      Rate and Rhythm: Normal rate and regular rhythm.      Heart sounds: S1 normal and S2 normal. No murmur heard.     No friction rub. No gallop. No S3 or S4 sounds.   Pulmonary:      Effort: Pulmonary effort is normal. No respiratory distress.      Breath sounds: Normal breath sounds. No stridor. No wheezing or rales.   Chest:      Chest wall: No tenderness.   Abdominal:      General: Bowel sounds are normal. There is no distension.      Palpations: Abdomen is soft. There is no mass.      Tenderness: There is no abdominal tenderness. There is no rebound.   Genitourinary:     Comments: Deferred  Musculoskeletal:         General: No tenderness or deformity. Normal range of motion.      Cervical back: Normal range of motion and neck supple.   Lymphadenopathy:      Cervical: No cervical adenopathy.   Skin:     General: Skin is warm and dry.      Coloration: Skin is not pale.      Findings: No erythema or rash.   Neurological:      Mental Status: She is alert and oriented to person, place, and time.      Motor: No abnormal muscle tone.      Coordination: Coordination normal.   Psychiatric:         Behavior: Behavior normal.         Thought Content: Thought content normal.         Judgment: Judgment normal.         Lab Results   Component Value Date     01/12/2025    K 3.8 01/12/2025     01/12/2025    CO2 26 01/12/2025    BUN 14 01/12/2025    CREATININE 0.9 01/12/2025     (H) 01/12/2025    HGBA1C 5.0 03/21/2022    AST 20 01/12/2025    ALT 15 01/12/2025    ALBUMIN 4.0 01/12/2025    PROT 7.4 01/12/2025    BILITOT 0.2 01/12/2025    WBC 9.65 01/12/2025    HGB 14.8 01/12/2025    HCT 45.3 01/12/2025    MCV 94 01/12/2025     01/12/2025    TSH 2.610 01/12/2025    CHOL 218 (H) 03/21/2022    HDL 91 (H) 03/21/2022    LDLCALC 100.6 03/21/2022    TRIG 132 03/21/2022    BNP <10  01/12/2025         BNP (pg/mL)   Date Value   01/12/2025 <10   03/21/2022 <10          Assessment:      1. Chest pain, unspecified type    2. Tachycardia    3. Vertigo    4. SOB (shortness of breath)    5. Irritable bowel syndrome, unspecified type    6. Memory loss    7. Other form of dyspnea    8. Carotid bruit, unspecified laterality        Plan:     Dizziness, presyncope, vertigo, tachycardia , with CP/SOB  - prior CV testing neg - echo, stress, vital 2022  - cont propranolol PRN 10mg   - order ECHO, ECG stress test and 7 day vital monitor  - order Carotid us  - refer to ENT    2. IBS  - cont tx per PCP/GI    3. Vit D def, memory loss  - cont supplements    4. Anxiety  - was on meds in past    Visit today included increased complexity associated with the care of the episodic problem tachycardia addressed and managing the longitudinal care of the patient due to the serious and/or complex managed problem(s) .        Thank you for allowing me to participate in this patient's care. Please do not hesitate to contact me with any questions or concerns. Consult note has been forwarded to the referral physician.

## 2025-01-22 ENCOUNTER — PATIENT MESSAGE (OUTPATIENT)
Dept: CARDIOLOGY | Facility: HOSPITAL | Age: 80
End: 2025-01-22
Payer: MEDICARE

## 2025-01-30 DIAGNOSIS — Z00.00 ENCOUNTER FOR MEDICARE ANNUAL WELLNESS EXAM: ICD-10-CM

## 2025-02-06 ENCOUNTER — HOSPITAL ENCOUNTER (OUTPATIENT)
Dept: CARDIOLOGY | Facility: HOSPITAL | Age: 80
Discharge: HOME | End: 2025-02-06
Attending: INTERNAL MEDICINE
Payer: MEDICARE

## 2025-02-06 ENCOUNTER — TELEPHONE (OUTPATIENT)
Dept: CARDIOLOGY | Facility: CLINIC | Age: 80
End: 2025-02-06
Payer: MEDICARE

## 2025-02-06 VITALS
SYSTOLIC BLOOD PRESSURE: 123 MMHG | DIASTOLIC BLOOD PRESSURE: 76 MMHG | BODY MASS INDEX: 25.27 KG/M2 | WEIGHT: 148 LBS | HEIGHT: 64 IN

## 2025-02-06 VITALS — BODY MASS INDEX: 25.27 KG/M2 | WEIGHT: 148 LBS | HEIGHT: 64 IN

## 2025-02-06 DIAGNOSIS — R07.9 CHEST PAIN, UNSPECIFIED TYPE: ICD-10-CM

## 2025-02-06 DIAGNOSIS — R00.0 TACHYCARDIA: ICD-10-CM

## 2025-02-06 DIAGNOSIS — K58.9 IRRITABLE BOWEL SYNDROME, UNSPECIFIED TYPE: ICD-10-CM

## 2025-02-06 DIAGNOSIS — R42 VERTIGO: ICD-10-CM

## 2025-02-06 DIAGNOSIS — R41.3 MEMORY LOSS: ICD-10-CM

## 2025-02-06 DIAGNOSIS — R06.02 SOB (SHORTNESS OF BREATH): ICD-10-CM

## 2025-02-06 DIAGNOSIS — R06.09 OTHER FORM OF DYSPNEA: ICD-10-CM

## 2025-02-06 DIAGNOSIS — R09.89 CAROTID BRUIT, UNSPECIFIED LATERALITY: ICD-10-CM

## 2025-02-06 LAB
AORTIC ROOT ANNULUS: 2.71 CM
ASCENDING AORTA: 2.73 CM
AV INDEX (PROSTH): 0.9
AV MEAN GRADIENT: 1 MMHG
AV PEAK GRADIENT: 3 MMHG
AV VALVE AREA BY VELOCITY RATIO: 2.7 CM²
AV VALVE AREA: 2.8 CM²
AV VELOCITY RATIO: 0.88
BSA FOR ECHO PROCEDURE: 1.74 M2
CV ECHO LV RWT: 0.48 CM
CV STRESS BASE HR: 84 BPM
DIASTOLIC BLOOD PRESSURE: 77 MMHG
DOP CALC AO PEAK VEL: 0.8 M/S
DOP CALC AO VTI: 15.6 CM
DOP CALC LVOT AREA: 3.1 CM2
DOP CALC LVOT DIAMETER: 2 CM
DOP CALC LVOT PEAK VEL: 0.7 M/S
DOP CALC LVOT STROKE VOLUME: 44 CM3
DOP CALC RVOT PEAK VEL: 0.6 M/S
DOP CALC RVOT VTI: 10 CM
DOP CALCLVOT PEAK VEL VTI: 14 CM
E WAVE DECELERATION TIME: 172 MSEC
E/A RATIO: 0.79
E/E' RATIO: 8 M/S
ECHO LV POSTERIOR WALL: 0.8 CM (ref 0.6–1.1)
FRACTIONAL SHORTENING: 33.3 % (ref 28–44)
INTERVENTRICULAR SEPTUM: 0.9 CM (ref 0.6–1.1)
IVC DIAMETER: 1.34 CM
IVRT: 83 MSEC
LA MAJOR: 3.3 CM
LA MINOR: 3 CM
LA WIDTH: 2.7 CM
LEFT ARM DIASTOLIC BLOOD PRESSURE: 75 MMHG
LEFT ARM SYSTOLIC BLOOD PRESSURE: 125 MMHG
LEFT ATRIUM AREA SYSTOLIC (APICAL 2 CHAMBER): 7.28 CM2
LEFT ATRIUM AREA SYSTOLIC (APICAL 4 CHAMBER): 8.34 CM2
LEFT ATRIUM SIZE: 2.4 CM
LEFT ATRIUM VOLUME INDEX MOD: 10 ML/M2
LEFT ATRIUM VOLUME INDEX: 10 ML/M2
LEFT ATRIUM VOLUME MOD: 17 ML
LEFT ATRIUM VOLUME: 17 CM3
LEFT CBA DIAS: 25 CM/S
LEFT CBA SYS: 78 CM/S
LEFT CCA DIST DIAS: 26 CM/S
LEFT CCA DIST SYS: 81 CM/S
LEFT CCA MID DIAS: 28 CM/S
LEFT CCA MID SYS: 85 CM/S
LEFT CCA PROX DIAS: 25 CM/S
LEFT CCA PROX SYS: 86 CM/S
LEFT ECA DIAS: 10 CM/S
LEFT ECA SYS: 63 CM/S
LEFT ICA DIST DIAS: 54 CM/S
LEFT ICA DIST SYS: 140 CM/S
LEFT ICA MID DIAS: 44 CM/S
LEFT ICA MID SYS: 105 CM/S
LEFT ICA PROX DIAS: 18 CM/S
LEFT ICA PROX SYS: 57 CM/S
LEFT INTERNAL DIMENSION IN SYSTOLE: 2.2 CM (ref 2.1–4)
LEFT VENTRICLE DIASTOLIC VOLUME INDEX: 26.41 ML/M2
LEFT VENTRICLE DIASTOLIC VOLUME: 45.42 ML
LEFT VENTRICLE END SYSTOLIC VOLUME APICAL 2 CHAMBER: 15.36 ML
LEFT VENTRICLE END SYSTOLIC VOLUME APICAL 4 CHAMBER: 15.38 ML
LEFT VENTRICLE MASS INDEX: 43.4 G/M2
LEFT VENTRICLE SYSTOLIC VOLUME INDEX: 9.5 ML/M2
LEFT VENTRICLE SYSTOLIC VOLUME: 16.39 ML
LEFT VENTRICULAR INTERNAL DIMENSION IN DIASTOLE: 3.3 CM (ref 3.5–6)
LEFT VENTRICULAR MASS: 74.7 G
LEFT VERTEBRAL DIAS: 12 CM/S
LEFT VERTEBRAL SYS: 43 CM/S
LV LATERAL E/E' RATIO: 7.2 M/S
LV SEPTAL E/E' RATIO: 9.3 M/S
LVED V (TEICH): 45.42 ML
LVES V (TEICH): 16.39 ML
LVOT MG: 1.16 MMHG
LVOT MV: 0.5 CM/S
MV PEAK A VEL: 0.82 M/S
MV PEAK E VEL: 0.65 M/S
MV STENOSIS PRESSURE HALF TIME: 49.84 MS
MV VALVE AREA P 1/2 METHOD: 4.41 CM2
OHS CV CAROTID RIGHT ICA EDV HIGHEST: 43
OHS CV CAROTID ULTRASOUND LEFT ICA/CCA RATIO: 1.73
OHS CV CAROTID ULTRASOUND RIGHT ICA/CCA RATIO: 1.94
OHS CV CPX 1 MINUTE RECOVERY HEART RATE: 122 BPM
OHS CV CPX 85 PERCENT MAX PREDICTED HEART RATE MALE: 122
OHS CV CPX ESTIMATED METS: 5
OHS CV CPX MAX PREDICTED HEART RATE: 143
OHS CV CPX PATIENT IS FEMALE: 1
OHS CV CPX PATIENT IS MALE: 0
OHS CV CPX PEAK DIASTOLIC BLOOD PRESSURE: 64 MMHG
OHS CV CPX PEAK HEAR RATE: 142 BPM
OHS CV CPX PEAK RATE PRESSURE PRODUCT: NORMAL
OHS CV CPX PEAK SYSTOLIC BLOOD PRESSURE: 197 MMHG
OHS CV CPX PERCENT MAX PREDICTED HEART RATE ACHIEVED: 103
OHS CV CPX RATE PRESSURE PRODUCT PRESENTING: 9996
OHS CV PV CAROTID LEFT HIGHEST CCA: 86
OHS CV PV CAROTID LEFT HIGHEST ICA: 140
OHS CV PV CAROTID RIGHT HIGHEST CCA: 89
OHS CV PV CAROTID RIGHT HIGHEST ICA: 155
OHS CV RV/LV RATIO: 0.76 CM
OHS CV US CAROTID LEFT HIGHEST EDV: 54
PISA TR MAX VEL: 2.5 M/S
PV MEAN GRADIENT: 1 MMHG
PV PEAK GRADIENT: 3 MMHG
PV PEAK VELOCITY: 0.81 M/S
RA MAJOR: 3.6 CM
RA PRESSURE ESTIMATED: 3 MMHG
RA WIDTH: 2.88 CM
RIGHT ARM DIASTOLIC BLOOD PRESSURE: 75 MMHG
RIGHT ARM SYSTOLIC BLOOD PRESSURE: 122 MMHG
RIGHT CBA DIAS: 14 CM/S
RIGHT CBA SYS: 50 CM/S
RIGHT CCA DIST DIAS: 22 CM/S
RIGHT CCA DIST SYS: 80 CM/S
RIGHT CCA MID DIAS: 22 CM/S
RIGHT CCA MID SYS: 89 CM/S
RIGHT CCA PROX DIAS: 21 CM/S
RIGHT CCA PROX SYS: 78 CM/S
RIGHT ECA DIAS: 17 CM/S
RIGHT ECA SYS: 128 CM/S
RIGHT ICA DIST DIAS: 43 CM/S
RIGHT ICA DIST SYS: 155 CM/S
RIGHT ICA MID DIAS: 30 CM/S
RIGHT ICA MID SYS: 77 CM/S
RIGHT ICA PROX DIAS: 16 CM/S
RIGHT ICA PROX SYS: 53 CM/S
RIGHT VENTRICLE DIASTOLIC BASEL DIMENSION: 2.5 CM
RIGHT VENTRICULAR END-DIASTOLIC DIMENSION: 2.48 CM
RIGHT VERTEBRAL DIAS: 20 CM/S
RIGHT VERTEBRAL SYS: 59 CM/S
RV TB RVSP: 6 MMHG
SINUS: 2.7 CM
STJ: 2.49 CM
STRESS ECHO POST EXERCISE DUR MIN: 2 MINUTES
STRESS ECHO POST EXERCISE DUR SEC: 49 SECONDS
SYSTOLIC BLOOD PRESSURE: 119 MMHG
TDI LATERAL: 0.09 M/S
TDI SEPTAL: 0.07 M/S
TDI: 0.08 M/S
TR MAX PG: 25 MMHG
TRICUSPID ANNULAR PLANE SYSTOLIC EXCURSION: 1.97 CM
TV REST PULMONARY ARTERY PRESSURE: 28 MMHG
Z-SCORE OF LEFT VENTRICULAR DIMENSION IN END DIASTOLE: -3.69
Z-SCORE OF LEFT VENTRICULAR DIMENSION IN END SYSTOLE: -2.33

## 2025-02-06 PROCEDURE — 93306 TTE W/DOPPLER COMPLETE: CPT | Mod: 26,,, | Performed by: INTERNAL MEDICINE

## 2025-02-06 PROCEDURE — 93880 EXTRACRANIAL BILAT STUDY: CPT | Mod: 26,,, | Performed by: INTERNAL MEDICINE

## 2025-02-06 PROCEDURE — 93306 TTE W/DOPPLER COMPLETE: CPT

## 2025-02-06 PROCEDURE — 93016 CV STRESS TEST SUPVJ ONLY: CPT | Mod: ,,, | Performed by: INTERNAL MEDICINE

## 2025-02-06 PROCEDURE — 93017 CV STRESS TEST TRACING ONLY: CPT

## 2025-02-06 PROCEDURE — 93018 CV STRESS TEST I&R ONLY: CPT | Mod: ,,, | Performed by: INTERNAL MEDICINE

## 2025-02-06 PROCEDURE — 93880 EXTRACRANIAL BILAT STUDY: CPT

## 2025-02-06 PROCEDURE — 93244 EXT ECG>48HR<7D REV&INTERPJ: CPT | Mod: ,,, | Performed by: INTERNAL MEDICINE

## 2025-02-06 NOTE — TELEPHONE ENCOUNTER
Pt notified  pb    ----- Message from Bill Harkins MD sent at 2/6/2025  4:23 PM CST -----  Please contact the patient and let them know that their results of echo reveals Overall normal heart function and valves.

## 2025-02-07 ENCOUNTER — OFFICE VISIT (OUTPATIENT)
Dept: OTOLARYNGOLOGY | Facility: CLINIC | Age: 80
End: 2025-02-07
Payer: MEDICARE

## 2025-02-07 ENCOUNTER — TELEPHONE (OUTPATIENT)
Dept: CARDIOLOGY | Facility: CLINIC | Age: 80
End: 2025-02-07
Payer: MEDICARE

## 2025-02-07 VITALS — WEIGHT: 150.38 LBS | HEIGHT: 64 IN | BODY MASS INDEX: 25.67 KG/M2

## 2025-02-07 DIAGNOSIS — H93.13 TINNITUS OF BOTH EARS: ICD-10-CM

## 2025-02-07 DIAGNOSIS — R05.3 CHRONIC COUGH: ICD-10-CM

## 2025-02-07 DIAGNOSIS — H61.21 IMPACTED CERUMEN OF RIGHT EAR: ICD-10-CM

## 2025-02-07 DIAGNOSIS — H91.93 BILATERAL HEARING LOSS, UNSPECIFIED HEARING LOSS TYPE: ICD-10-CM

## 2025-02-07 DIAGNOSIS — R42 DIZZINESS: Primary | ICD-10-CM

## 2025-02-07 PROCEDURE — 1159F MED LIST DOCD IN RCRD: CPT | Mod: CPTII,S$GLB,, | Performed by: PHYSICIAN ASSISTANT

## 2025-02-07 PROCEDURE — 3288F FALL RISK ASSESSMENT DOCD: CPT | Mod: CPTII,S$GLB,, | Performed by: PHYSICIAN ASSISTANT

## 2025-02-07 PROCEDURE — 69210 REMOVE IMPACTED EAR WAX UNI: CPT | Mod: S$GLB,,, | Performed by: PHYSICIAN ASSISTANT

## 2025-02-07 PROCEDURE — 1101F PT FALLS ASSESS-DOCD LE1/YR: CPT | Mod: CPTII,S$GLB,, | Performed by: PHYSICIAN ASSISTANT

## 2025-02-07 PROCEDURE — 1126F AMNT PAIN NOTED NONE PRSNT: CPT | Mod: CPTII,S$GLB,, | Performed by: PHYSICIAN ASSISTANT

## 2025-02-07 PROCEDURE — 99999 PR PBB SHADOW E&M-EST. PATIENT-LVL IV: CPT | Mod: PBBFAC,,, | Performed by: PHYSICIAN ASSISTANT

## 2025-02-07 PROCEDURE — 99204 OFFICE O/P NEW MOD 45 MIN: CPT | Mod: 25,S$GLB,, | Performed by: PHYSICIAN ASSISTANT

## 2025-02-07 NOTE — TELEPHONE ENCOUNTER
Pt notified and verbalized understanding pb----- Message from Bill Harkins MD sent at 2/7/2025  4:44 PM CST -----  Please contact the patient and let them know that their results of stress test reveal low exercise capacity without any significant concern for blockages, elevated BP noted towards the end. Will need to discuss further at follow up.

## 2025-02-07 NOTE — PROGRESS NOTES
Subjective     Patient ID: Jannet Hayes is a 79 y.o. female.    Chief Complaint: Dizziness (Pt states whe she stopped taking Klonopin in 2019 after 20 years use, started with tinnitus. Now she c/o slight dizziness and lightheadedness from time to time. States right sinus gets clogged during night and when she rolls over can feel draining. C/o cough x5-6 years and has noticed wheezing upon expiration towards the end)    Patient is a very pleasant 79 y.o. female here to see me today for the first time for evaluation of dizziness.   She reports that the symptoms have been present since 2019.  On average, the patient reports symptoms that occur daily.  She describes the dizziness as mild lightheadedness and imbalance.  No spinning sensations.  She says that it lasts seconds to minutes.  She has noted that changes in position such as standing up acts as a trigger.  She denies aural pressure, otalgia, and otorrhea.  She has noticed gradual hearing loss in both ears and has had tinnitus AU since stopping Klonopin in 2019.  No previous otologic surgery.    She is following with Cardiology and had recent episodes of tachycardia.  Currently wearing a Holter monitor.  States she found out yesterday that she also has moderate blockages in both carotids.    Also mentions nasal congestion and chronic cough for 5+ years.  Not a smoker.  Not using any nasal sprays.    She has followed with Neurology (Dr. Vee Schaffer) and was told she may have multiple sclerosis but testing was inconclusive.  Told she has lesion on her spinal cord in 1999.  Unsure of timing of last MRI Brain.        Review of Systems   Constitutional:  Negative for fever.   HENT:  Positive for nasal congestion, hearing loss and tinnitus. Negative for ear discharge and ear pain.    Respiratory:  Positive for cough and shortness of breath.    Neurological:  Positive for dizziness.          Objective     Physical Exam  Constitutional:       General: She is not in  acute distress.     Appearance: She is well-developed.   HENT:      Head: Normocephalic and atraumatic.      Right Ear: Ear canal and external ear normal. There is impacted cerumen (removal described below).      Left Ear: Tympanic membrane, ear canal and external ear normal. No drainage.  No middle ear effusion. Tympanic membrane is not injected, erythematous or retracted.      Nose: Nose normal. No rhinorrhea.      Mouth/Throat:      Mouth: Mucous membranes are moist. Mucous membranes are not pale and not dry.      Dentition: Normal dentition.      Pharynx: Oropharynx is clear. Uvula midline. No oropharyngeal exudate or posterior oropharyngeal erythema.   Eyes:      General: Lids are normal. No scleral icterus.     Extraocular Movements:      Right eye: Normal extraocular motion and no nystagmus.      Left eye: Normal extraocular motion and no nystagmus.      Conjunctiva/sclera: Conjunctivae normal.      Right eye: Right conjunctiva is not injected. No chemosis.     Left eye: Left conjunctiva is not injected. No chemosis.     Pupils: Pupils are equal, round, and reactive to light.   Neck:      Trachea: Trachea and phonation normal. No tracheal tenderness.   Pulmonary:      Effort: Pulmonary effort is normal. No respiratory distress.      Breath sounds: No stridor.   Lymphadenopathy:      Head:      Right side of head: No preauricular or posterior auricular adenopathy.      Left side of head: No preauricular or posterior auricular adenopathy.      Cervical: No cervical adenopathy.   Skin:     General: Skin is warm and dry.      Findings: No erythema or rash.   Neurological:      Mental Status: She is alert and oriented to person, place, and time.      Cranial Nerves: No cranial nerve deficit.   Psychiatric:         Behavior: Behavior normal.       Procedure Note    CHIEF COMPLAINT:  Cerumen Impaction    Description:  The patient was seated in an exam chair.  An ear speculum was placed in the right EAC and was  examined under the microscope.  Alligator forceps were used to remove a large cerumen impaction.  The tympanic membrane was visualized and was normal in appearance.   The patient tolerated the procedure well.         Assessment and Plan     1. Dizziness  -     Ambulatory referral/consult to ENT    2. Bilateral hearing loss, unspecified hearing loss type    3. Tinnitus of both ears    4. Impacted cerumen of right ear    5. Chronic cough        I had a long discussion with the patient regarding their symptoms.  Dizziness, vertigo and disequilibrium are common symptoms reported by adults during visits to their doctors. They are all symptoms that can result from a peripheral vestibular disorder (a dysfunction of the balance organs of the inner ear) or central vestibular disorder (a dysfunction of one or more parts of the central nervous system that help process balance and spatial information).  There are also non-vestibular causes of dizziness, and dizziness can be linked to a wide array of problems such as blood-flow irregularities from cardiovascular problems and blood pressure fluctuations.  I would recommend a vestibular screen with audiogram for further diagnostic testing, and will contact the patient with further recommendations when that is complete.      Discussed that if her vestibular screen is normal, I recommend she continue to followup with Neurology (told she possibly has MS) and Cardiology.    Tinnitus is most often caused by a hearing loss, and that as the hair cells are damaged, either genetic or as a result of loud noise exposure, they then cause tinnitus.  Some patients find that restricting the salt or caffeine in their diet helps, and there is also an OTC supplement, lipoflavinoids, that some people find to be effective though their benefit is not fully proven.  Tinnitus tends to be louder in times of stress and fatigue, and may decrease with time.  Sound machines may also be an effective masking  technique if needed at night.     Cerumen impaction:  Removed today without difficulty.  I would recommend the use of a wax softening drop, either over the counter Debrox or mineral oil, on a weekly basis.  I also instructed the patient to avoid Qtips.    Chronic cough:  Recommend RTC with MD for scope examination.           No follow-ups on file.

## 2025-02-07 NOTE — TELEPHONE ENCOUNTER
Pt notified and she verbalized understanding pb    ----- Message from Nurse Robledo sent at 2/6/2025  5:03 PM CST -----    ----- Message -----  From: Bill Harkins MD  Sent: 2/6/2025   4:59 PM CST  To: [facility] Staff    Please contact the patient and let them know that their results reveal moderate carotid artery disease, will continue to monitor.

## 2025-02-20 ENCOUNTER — RESULTS FOLLOW-UP (OUTPATIENT)
Dept: CARDIOLOGY | Facility: CLINIC | Age: 80
End: 2025-02-20
Payer: MEDICARE

## 2025-02-20 LAB
OHS CV HOLTER SINUS AVERAGE HR: 78
OHS CV HOLTER SINUS MAX HR: 137
OHS CV HOLTER SINUS MIN HR: 55

## 2025-02-25 ENCOUNTER — LAB VISIT (OUTPATIENT)
Dept: LAB | Facility: HOSPITAL | Age: 80
End: 2025-02-25
Attending: FAMILY MEDICINE
Payer: MEDICARE

## 2025-02-25 ENCOUNTER — OFFICE VISIT (OUTPATIENT)
Dept: INTERNAL MEDICINE | Facility: CLINIC | Age: 80
End: 2025-02-25
Payer: MEDICARE

## 2025-02-25 VITALS
DIASTOLIC BLOOD PRESSURE: 61 MMHG | BODY MASS INDEX: 24.92 KG/M2 | WEIGHT: 145.94 LBS | SYSTOLIC BLOOD PRESSURE: 130 MMHG | TEMPERATURE: 98 F | OXYGEN SATURATION: 97 % | HEART RATE: 84 BPM | HEIGHT: 64 IN

## 2025-02-25 DIAGNOSIS — I65.23 BILATERAL CAROTID ARTERY STENOSIS: ICD-10-CM

## 2025-02-25 DIAGNOSIS — Z13.6 SCREENING FOR CARDIOVASCULAR CONDITION: ICD-10-CM

## 2025-02-25 DIAGNOSIS — M81.0 OSTEOPOROSIS, UNSPECIFIED OSTEOPOROSIS TYPE, UNSPECIFIED PATHOLOGICAL FRACTURE PRESENCE: ICD-10-CM

## 2025-02-25 DIAGNOSIS — R00.0 TACHYCARDIA: ICD-10-CM

## 2025-02-25 DIAGNOSIS — R73.9 HYPERGLYCEMIA: ICD-10-CM

## 2025-02-25 DIAGNOSIS — R73.9 HYPERGLYCEMIA: Primary | ICD-10-CM

## 2025-02-25 DIAGNOSIS — Z78.0 POSTMENOPAUSAL: ICD-10-CM

## 2025-02-25 DIAGNOSIS — R05.3 CHRONIC COUGH: ICD-10-CM

## 2025-02-25 LAB
ALBUMIN SERPL BCP-MCNC: 4 G/DL (ref 3.5–5.2)
ALP SERPL-CCNC: 45 U/L (ref 40–150)
ALT SERPL W/O P-5'-P-CCNC: 13 U/L (ref 10–44)
ANION GAP SERPL CALC-SCNC: 11 MMOL/L (ref 8–16)
AST SERPL-CCNC: 45 U/L (ref 10–40)
BASOPHILS # BLD AUTO: 0.11 K/UL (ref 0–0.2)
BASOPHILS NFR BLD: 1.2 % (ref 0–1.9)
BILIRUB SERPL-MCNC: 0.4 MG/DL (ref 0.1–1)
BUN SERPL-MCNC: 16 MG/DL (ref 8–23)
CALCIUM SERPL-MCNC: 9.7 MG/DL (ref 8.7–10.5)
CHLORIDE SERPL-SCNC: 102 MMOL/L (ref 95–110)
CHOLEST SERPL-MCNC: 202 MG/DL (ref 120–199)
CHOLEST/HDLC SERPL: 3.5 {RATIO} (ref 2–5)
CO2 SERPL-SCNC: 25 MMOL/L (ref 23–29)
CREAT SERPL-MCNC: 0.8 MG/DL (ref 0.5–1.4)
DIFFERENTIAL METHOD BLD: NORMAL
EOSINOPHIL # BLD AUTO: 0.2 K/UL (ref 0–0.5)
EOSINOPHIL NFR BLD: 2.5 % (ref 0–8)
ERYTHROCYTE [DISTWIDTH] IN BLOOD BY AUTOMATED COUNT: 13.2 % (ref 11.5–14.5)
EST. GFR  (NO RACE VARIABLE): >60 ML/MIN/1.73 M^2
ESTIMATED AVG GLUCOSE: 100 MG/DL (ref 68–131)
GLUCOSE SERPL-MCNC: 65 MG/DL (ref 70–110)
HBA1C MFR BLD: 5.1 % (ref 4–5.6)
HCT VFR BLD AUTO: 46.9 % (ref 37–48.5)
HDLC SERPL-MCNC: 58 MG/DL (ref 40–75)
HDLC SERPL: 28.7 % (ref 20–50)
HGB BLD-MCNC: 15.3 G/DL (ref 12–16)
IMM GRANULOCYTES # BLD AUTO: 0.02 K/UL (ref 0–0.04)
IMM GRANULOCYTES NFR BLD AUTO: 0.2 % (ref 0–0.5)
LDLC SERPL CALC-MCNC: 126.6 MG/DL (ref 63–159)
LYMPHOCYTES # BLD AUTO: 2.4 K/UL (ref 1–4.8)
LYMPHOCYTES NFR BLD: 26.2 % (ref 18–48)
MCH RBC QN AUTO: 30.8 PG (ref 27–31)
MCHC RBC AUTO-ENTMCNC: 32.6 G/DL (ref 32–36)
MCV RBC AUTO: 94 FL (ref 82–98)
MONOCYTES # BLD AUTO: 0.9 K/UL (ref 0.3–1)
MONOCYTES NFR BLD: 10 % (ref 4–15)
NEUTROPHILS # BLD AUTO: 5.4 K/UL (ref 1.8–7.7)
NEUTROPHILS NFR BLD: 59.9 % (ref 38–73)
NONHDLC SERPL-MCNC: 144 MG/DL
NRBC BLD-RTO: 0 /100 WBC
PLATELET # BLD AUTO: 347 K/UL (ref 150–450)
PMV BLD AUTO: 10.6 FL (ref 9.2–12.9)
POTASSIUM SERPL-SCNC: 4.3 MMOL/L (ref 3.5–5.1)
PROT SERPL-MCNC: 7.3 G/DL (ref 6–8.4)
RBC # BLD AUTO: 4.97 M/UL (ref 4–5.4)
SODIUM SERPL-SCNC: 138 MMOL/L (ref 136–145)
TRIGL SERPL-MCNC: 87 MG/DL (ref 30–150)
WBC # BLD AUTO: 9.06 K/UL (ref 3.9–12.7)

## 2025-02-25 PROCEDURE — 83036 HEMOGLOBIN GLYCOSYLATED A1C: CPT | Performed by: FAMILY MEDICINE

## 2025-02-25 PROCEDURE — 80053 COMPREHEN METABOLIC PANEL: CPT | Performed by: FAMILY MEDICINE

## 2025-02-25 PROCEDURE — 82306 VITAMIN D 25 HYDROXY: CPT | Performed by: FAMILY MEDICINE

## 2025-02-25 PROCEDURE — 85025 COMPLETE CBC W/AUTO DIFF WBC: CPT | Performed by: FAMILY MEDICINE

## 2025-02-25 PROCEDURE — 99999 PR PBB SHADOW E&M-EST. PATIENT-LVL V: CPT | Mod: PBBFAC,,, | Performed by: FAMILY MEDICINE

## 2025-02-25 PROCEDURE — 80061 LIPID PANEL: CPT | Performed by: FAMILY MEDICINE

## 2025-02-25 RX ORDER — ALPHA LIPOIC ACID/BIOTIN 300 MG-330
12 TABLET,IMMED, EXTENDED RELEASE, BIPHASIC ORAL DAILY
COMMUNITY

## 2025-02-25 RX ORDER — ASPIRIN 325 MG
100 TABLET, DELAYED RELEASE (ENTERIC COATED) ORAL DAILY
COMMUNITY

## 2025-02-25 RX ORDER — OLIVE OIL
OIL (ML) MISCELLANEOUS
COMMUNITY

## 2025-02-25 NOTE — PROGRESS NOTES
Subjective:      Patient ID: Jannet Hayes is a 79 y.o. female.    Chief Complaint: Follow-up (Cardiology follow up)      History of Present Illness    CHIEF COMPLAINT:  Ms. Hayes presents today for follow-up regarding concerns about elevated heart rate and blood pressure with activity    CARDIOVASCULAR SYMPTOMS:  She reports heart rate elevation to 114 with minimal activity such as applying makeup, requiring frequent rest breaks. She can only walk for seven minutes before heart rate exceeds 110, reaching 118. She reports one episode of pressure at base of throat while watching television. Home blood pressure readings show highest measurement of 126/68-75.    RECENT EMERGENCY ROOM VISIT:  In January, she presented to the Emergency Room with dizziness and shortness of breath and elevated blood pressure. Prior to ER visit, home pulse oximeter showed heart rate of 156 BPM. Blood glucose was 139.    CARDIOVASCULAR TESTING:  Echocardiogram was normal. Stress test was incomplete, lasting less than three minutes due to rapid increase in heart rate and blood pressure. Holter monitor worn for 1 week showed mostly normal results with some premature beats, no atrial fibrillation, and maximum heart rate of 137 bpm. Carotid artery testing revealed moderate buildup with 40-49% blockage bilaterally.    ACTIVITY AND LIFESTYLE:  She has been very sedentary for the past four years, primarily watching television while lying in bed. This began in 2019 following vertigo development after discontinuing clonazepam, which she had taken for 20 years. She practices intermittent fasting, typically eating around 1:00 PM. She recently started a ketogenic diet and eliminated bread and ice cream, resulting in a 4.5-pound weight loss over past three weeks.    MEDICATIONS:  She declines prescribed propranolol due to concerns about prescription medications. She has been taking vitamin D supplementation for the past year.        Past Medical  "History:   Diagnosis Date    Amnesia, global, transient 2019    Chronic constipation     Concussion 2020    History of IBS 2016    Skin cancer     Tremors of nervous system 2020    Vertigo 2020          Past Surgical History:   Procedure Laterality Date    ADENOIDECTOMY      APPENDECTOMY      AUGMENTATION OF BREAST  2008    silicone    COSMETIC SURGERY      EYE SURGERY      LUMBAR LAMINECTOMY WITH SURGICAL REMOVAL OF LESION OF SPINAL CORD BY POSTERIOR APPROACH  1999     Family History   Problem Relation Name Age of Onset    Diabetes Mother Roz     Stroke Mother Roz     Lymphoma Mother Roz     Arthritis Mother Roz     Cancer Mother Roz     Vision loss Mother Roz     Heart disease Father Darron      Social History[1]  Review of patient's allergies indicates:   Allergen Reactions    Oxycodone-acetaminophen Anaphylaxis     HALLUCINATIONS    Penicillins Hives     ITCHING, SWELLING.  Patient states "can take Keflex without any problems".  Other reaction(s): hives, swelling     Codeine Other (See Comments)     hallucinations    Erythromycin Other (See Comments)     "odd thinking patterns, black dots in front of eyes"...."mycins"       Review of Systems   Constitutional:  Positive for weight loss (intentional, intermittent fasting).   Respiratory:  Negative for shortness of breath.    Cardiovascular:  Positive for palpitations. Negative for chest pain and leg swelling.     Objective:       /61 (BP Location: Left arm, Patient Position: Sitting)   Pulse 84   Temp 98.2 °F (36.8 °C) (Tympanic)   Ht 5' 4" (1.626 m)   Wt 66.2 kg (145 lb 15.1 oz)   SpO2 97%   BMI 25.05 kg/m²   Physical Exam    Cardiovascular: All normal.        Physical Exam  Constitutional:       General: She is not in acute distress.     Appearance: Normal appearance. She is well-developed. She is not ill-appearing or diaphoretic.   Cardiovascular:      Rate and Rhythm: Normal rate and regular rhythm.      Heart sounds: Normal heart " sounds.      Comments: No carotid bruits heard  Pulmonary:      Effort: Pulmonary effort is normal.      Breath sounds: Normal breath sounds.   Neurological:      Mental Status: She is alert and oriented to person, place, and time.   Psychiatric:         Mood and Affect: Mood normal.         Behavior: Behavior normal.         Thought Content: Thought content normal.         Judgment: Judgment normal.         Assessment:     1. Hyperglycemia    2. Tachycardia    3. Bilateral carotid artery stenosis      Plan:   Assessment & Plan      EVALUATED CARDIAC TESTS:  - 1.  - Echocardiogram results were generally good. 2.  - Stress test was inconclusive due to short duration (<3 minutes), with blood pressure reaching 197 mmHg. 3.  - Holter monitor showed normal average heart rate of 78 bpm, with occasional increases to 130s (max 137 bpm).  - No atrial fibrillation detected. 4.  - Carotid artery ultrasound revealed 40-49% blockage bilaterally, indicating moderate buildup.    RECOMMENDATIONS:  Discussed at length that she needs to follow up with her cardiologist - suspect they will want to do further testing  Did recommend she try beta-blocker to see if she tolerates it  Did recommend she add low-dose aspirin daily  Patient overdue for routine labs-had not had lipid panel in some time, will have blood work drawn today and then follow-up to discuss results  Did have ER visit for the tachycardia in January-glucose at that time was 139-no known history of diabetes or prediabetes       Hyperglycemia  -     Hemoglobin A1C; Future; Expected date: 02/25/2025    Tachycardia    Bilateral carotid artery stenosis    30 minutes of total time spent on the encounter, which includes face to face time and non-face to face time preparing to see the patient (eg, review of tests), Obtaining and/or reviewing separately obtained history, Documenting clinical information in the electronic or other health record, Independently interpreting results  (not separately reported) and communicating results to the patient/family/caregiver, or Care coordination (not separately reported).     Medication List with Changes/Refills   Current Medications    ACETAMINOPHEN (TYLENOL) 500 MG TABLET    Take 500 mg by mouth as needed.    ACETYLCYSTEINE (NAC ORAL)    Take 1,000 mg by mouth Daily.    ANTIARTHRITIC COMBINATION NO.2 900 MG TAB    Take 2 tablets by mouth once.    ASHWAGANDHA ROOT EXTRACT 300 MG CAP    Take by mouth.    ASTAXANTHIN 12 MG CAP    Take 12 mg by mouth Daily.    BIMATOPROST (LATISSE) 0.03 % OPHTHALMIC SOLUTION    Place one drop on applicator and apply evenly along the skin of the upper eyelid at base of eyelashes once daily at bedtime; repeat procedure for second eye (use a clean applicator).    BLUEBERRY FLAVOR MISC    by Misc.(Non-Drug; Combo Route) route.    CHOLECALCIFEROL, VITAMIN D3, (VITAMIN D3) 25 MCG (1,000 UNIT) CAPSULE    Take by mouth.    CO-ENZYME Q-10 50 MG CAPSULE    Take 100 mg by mouth once daily.    COCONUT OIL ORAL    Take by mouth.    DHA-PHOSPHATIDYLSERINE ORAL    Take 300 capsules by mouth.    ESTRADIOL (VIVELLE-DOT) 0.075 MG/24 HR    Place onto the skin.    GARLIC ORAL    Take by mouth.    GLYCINE ORAL    Take 1,000 mg by mouth Daily.    MAGNESIUM HYDROXIDE 1,200 MG CHEW    Take by mouth.    MAGNESIUM HYDROXIDE 400 MG/5 ML (MILK OF MAGNESIA) 400 MG/5 ML SUSP    Take 30 mLs by mouth once daily.    MULTIVIT WITH MINERALS/LUTEIN (MULTIVITAMIN 50 PLUS ORAL)    Take 1 tablet by mouth.    MULTIVIT-MIN/FOLIC AC/COLLAGEN (WOMEN'S MULTIVITAMIN COLLAGEN ORAL)    Take by mouth.    OLIVE OIL EXTERNAL OIL    Apply topically as needed.    OMEGA 3-DHA-EPA-FISH -150-750 MG CAP    Take 1 capsule by mouth.    PASSION FLOWER ORAL    Take by mouth.    PHOSPHATIDYL SERINE, BULK, MISC    300 mg by Misc.(Non-Drug; Combo Route) route 2 (two) times a day.    PROGESTERONE (PROMETRIUM) 100 MG CAPSULE    Take 200 mg by mouth nightly.    PROPRANOLOL  (INDERAL) 10 MG TABLET    Take 1 tablet (10 mg total) by mouth 3 (three) times daily as needed (for palpitations, HR > 100).    SOYBEAN, FERMENTED (NATTOKINASE ORAL)    Take 200 mg by mouth 2 (two) times a day.    TAURINE 1,000 MG CAP    Take 1,000 mg by mouth 2 (two) times a day.    TURMERIC 400 MG CAP    Take by mouth.    UNABLE TO FIND    Take 1,000 mg by mouth Daily. Lion's pooja    UNABLE TO FIND    Take 1,250 mg by mouth Daily. Black Seed Oil    UNABLE TO FIND    Sulforaphane    UNABLE TO FIND    Advanced Memory Formula    UNABLE TO FIND    Take 200 mg by mouth 2 (two) times a day. L-theanine    VIT B COMPLEX 100 NO.2/HERBS (VITAMIN B COMPLEX 100  2-HERBS ORAL)    Take 1 tablet by mouth.    VITAMIN D (VITAMIN D3) 1000 UNITS TAB    Take by mouth.       This note was generated with the assistance of ambient listening technology. Verbal consent was obtained by the patient and accompanying visitor(s) for the recording of patient appointment to facilitate this note. I attest to having reviewed and edited the generated note for accuracy, though some syntax or spelling errors may persist. Please contact the author of this note for any clarification.            [1]   Social History  Socioeconomic History    Marital status:    Tobacco Use    Smoking status: Never    Smokeless tobacco: Never   Substance and Sexual Activity    Alcohol use: Yes     Alcohol/week: 1.0 standard drink of alcohol     Types: 1 Glasses of wine per week     Comment: occas    Drug use: Never    Sexual activity: Not Currently     Partners: Male     Birth control/protection: Post-menopausal

## 2025-02-26 ENCOUNTER — TELEPHONE (OUTPATIENT)
Dept: INTERNAL MEDICINE | Facility: CLINIC | Age: 80
End: 2025-02-26
Payer: MEDICARE

## 2025-02-26 ENCOUNTER — RESULTS FOLLOW-UP (OUTPATIENT)
Dept: INTERNAL MEDICINE | Facility: CLINIC | Age: 80
End: 2025-02-26

## 2025-02-26 LAB — 25(OH)D3+25(OH)D2 SERPL-MCNC: 130 NG/ML (ref 30–96)

## 2025-02-26 NOTE — TELEPHONE ENCOUNTER
Spoke with pt, scheduled follow up visit for next week as requested. Pt verbalized understanding.

## 2025-02-26 NOTE — TELEPHONE ENCOUNTER
----- Message from Elvi sent at 2/26/2025  9:11 AM CST -----  Contact: kary  Type:  Patient Returning CallWho Called:kary Who Left Message for Patient: coriDoes the patient know what this is regarding?: missed callWould the patient rather a call back or a response via [facility]? callBest Call Back Number: [phone] Additional Information:  patient wants to speak with the office

## 2025-02-27 ENCOUNTER — PATIENT MESSAGE (OUTPATIENT)
Dept: CARDIOLOGY | Facility: CLINIC | Age: 80
End: 2025-02-27
Payer: MEDICARE

## 2025-03-05 ENCOUNTER — OFFICE VISIT (OUTPATIENT)
Dept: INTERNAL MEDICINE | Facility: CLINIC | Age: 80
End: 2025-03-05
Payer: MEDICARE

## 2025-03-05 VITALS
OXYGEN SATURATION: 97 % | WEIGHT: 144.19 LBS | HEIGHT: 64 IN | BODY MASS INDEX: 24.62 KG/M2 | DIASTOLIC BLOOD PRESSURE: 68 MMHG | HEART RATE: 88 BPM | TEMPERATURE: 98 F | SYSTOLIC BLOOD PRESSURE: 126 MMHG

## 2025-03-05 DIAGNOSIS — R73.9 HYPERGLYCEMIA: ICD-10-CM

## 2025-03-05 DIAGNOSIS — R00.0 TACHYCARDIA: Primary | ICD-10-CM

## 2025-03-05 DIAGNOSIS — R74.01 ELEVATED AST (SGOT): ICD-10-CM

## 2025-03-05 PROCEDURE — 99999 PR PBB SHADOW E&M-EST. PATIENT-LVL III: CPT | Mod: PBBFAC,,, | Performed by: FAMILY MEDICINE

## 2025-03-05 PROCEDURE — 3074F SYST BP LT 130 MM HG: CPT | Mod: CPTII,S$GLB,, | Performed by: FAMILY MEDICINE

## 2025-03-05 PROCEDURE — 1101F PT FALLS ASSESS-DOCD LE1/YR: CPT | Mod: CPTII,S$GLB,, | Performed by: FAMILY MEDICINE

## 2025-03-05 PROCEDURE — 1126F AMNT PAIN NOTED NONE PRSNT: CPT | Mod: CPTII,S$GLB,, | Performed by: FAMILY MEDICINE

## 2025-03-05 PROCEDURE — 3078F DIAST BP <80 MM HG: CPT | Mod: CPTII,S$GLB,, | Performed by: FAMILY MEDICINE

## 2025-03-05 PROCEDURE — 99213 OFFICE O/P EST LOW 20 MIN: CPT | Mod: S$GLB,,, | Performed by: FAMILY MEDICINE

## 2025-03-05 PROCEDURE — G2211 COMPLEX E/M VISIT ADD ON: HCPCS | Mod: S$GLB,,, | Performed by: FAMILY MEDICINE

## 2025-03-05 PROCEDURE — 3288F FALL RISK ASSESSMENT DOCD: CPT | Mod: CPTII,S$GLB,, | Performed by: FAMILY MEDICINE

## 2025-03-05 NOTE — PROGRESS NOTES
"Subjective:      Patient ID: Jannet Hayes is a 79 y.o. female.    Chief Complaint: Follow-up      History of Present Illness    CHIEF COMPLAINT:  Ms. Hayes presents today for follow up of abnormal lab results and exercise intolerance    EXERCISE INTOLERANCE:  She reports ability to walk for only 10 minutes on flat surfaces, experiencing shortness of breath within 4-5 minutes of activity. During evening walks, her heart rate increases more rapidly compared to daytime walks despite maintaining the same pace and distance. She experiences a "twinge" sensation during physical activity, describing it as an awareness of her heart rather than actual chest pain or pressure. These symptoms prompt her to stop activity and rest until her heart rate decreases to 70-80s.    LABS:  Blood glucose ranges from . Liver function tests are elevated, possibly related to recent Tylenol use. Complete blood count is normal. Cholesterol shows increased LDL by 26 points with decreased but acceptable HDL. Vitamin D levels are elevated above normal range.    HEADACHES:  She experiences daily headaches that sometimes resolve spontaneously. She takes Tylenol as needed for management.    OTHER MEDICAL CONDITIONS:  She has essential tremors. She experiences leg swelling that worsens with prolonged sitting and by end of day, particularly during car rides over an hour, with swelling extending to ankles. The swelling has improved with intermittent fasting.    GI CONCERNS:  She reports chronic constipation and gas, managed with milk of magnesia and pre/probiotics.    MEDICATIONS:  She takes Vitamin D 10,000 IU daily and Tylenol as needed for headaches.        Past Medical History:   Diagnosis Date    Amnesia, global, transient 2019    Chronic constipation     Concussion 2020    History of IBS 2016    Skin cancer     Tremors of nervous system 2020    Vertigo 2020          Past Surgical History:   Procedure Laterality Date    ADENOIDECTOMY  " "    APPENDECTOMY      AUGMENTATION OF BREAST  2008    silicone    COSMETIC SURGERY      EYE SURGERY      LUMBAR LAMINECTOMY WITH SURGICAL REMOVAL OF LESION OF SPINAL CORD BY POSTERIOR APPROACH  1999     Family History   Problem Relation Name Age of Onset    Diabetes Mother Roz     Stroke Mother Roz     Lymphoma Mother Roz     Arthritis Mother Roz     Cancer Mother Roz     Vision loss Mother Roz     Heart disease Father Darron      Social History[1]  Review of patient's allergies indicates:   Allergen Reactions    Oxycodone-acetaminophen Anaphylaxis     HALLUCINATIONS    Penicillins Hives     ITCHING, SWELLING.  Patient states "can take Keflex without any problems".  Other reaction(s): hives, swelling     Codeine Other (See Comments)     hallucinations    Erythromycin Other (See Comments)     "odd thinking patterns, black dots in front of eyes"...."mycins"       Review of Systems   Constitutional:  Positive for malaise/fatigue. Negative for chills and fever.   HENT:  Negative for congestion.    Respiratory:  Negative for cough and shortness of breath.    Cardiovascular:  Positive for chest pain and palpitations.   Gastrointestinal:  Positive for abdominal pain.   Musculoskeletal:  Negative for myalgias.   Skin:  Negative for rash.     Objective:       /68   Pulse 88   Temp 97.8 °F (36.6 °C) (Tympanic)   Ht 5' 4" (1.626 m)   Wt 65.4 kg (144 lb 2.9 oz)   SpO2 97%   BMI 24.75 kg/m²   Physical Exam             Physical Exam  Vitals reviewed.   Constitutional:       General: She is not in acute distress.     Appearance: Normal appearance. She is well-developed. She is not ill-appearing or diaphoretic.   HENT:      Head: Normocephalic and atraumatic.      Right Ear: Hearing, tympanic membrane, ear canal and external ear normal.      Left Ear: Hearing, tympanic membrane, ear canal and external ear normal.      Nose: Nose normal.      Mouth/Throat:      Pharynx: Uvula midline. No oropharyngeal " exudate.   Eyes:      Conjunctiva/sclera: Conjunctivae normal.      Pupils: Pupils are equal, round, and reactive to light.   Neck:      Thyroid: No thyromegaly.      Trachea: No tracheal deviation.   Cardiovascular:      Rate and Rhythm: Normal rate and regular rhythm.      Heart sounds: Normal heart sounds. No murmur heard.  Pulmonary:      Effort: Pulmonary effort is normal. No respiratory distress.      Breath sounds: Normal breath sounds.   Abdominal:      General: Bowel sounds are normal.      Palpations: Abdomen is soft.      Tenderness: There is no abdominal tenderness. There is no guarding.      Hernia: No hernia is present.   Musculoskeletal:         General: Normal range of motion.      Cervical back: Normal range of motion and neck supple.   Lymphadenopathy:      Cervical: No cervical adenopathy.   Skin:     General: Skin is warm and dry.      Capillary Refill: Capillary refill takes less than 2 seconds.   Neurological:      General: No focal deficit present.      Mental Status: She is alert and oriented to person, place, and time.   Psychiatric:         Mood and Affect: Mood normal.         Behavior: Behavior normal.         Thought Content: Thought content normal.         Judgment: Judgment normal.         Assessment:     1. Tachycardia    2. Elevated AST (SGOT)    3. Hyperglycemia      Plan:   Assessment & Plan    CHEST DISCOMFORT AND POSSIBLE CARDIAC ISSUES:  - Assessed reported chest discomfort during exercise, considering possible cardiac etiology given known carotid artery blockage.  - Noted moderate blockage in carotid arteries.  - Suspected blockage around the heart may be causing symptoms.  - Discussed possibility of further cardiac evaluation, potentially including heart catheterization or other imaging studies.  - Advised caution and to take chest pain seriously.  - ER precautions while awaiting cardiology appts     ESSENTIAL TREMORS:  - Noted patient reports having essential tremors.    LEG  SWELLING (EDEMA):  - Instructed patient to monitor for any worsening of leg swelling.  - Noted patient reports swelling in legs, especially after prolonged sitting.  - Examined patient's legs and noted mild edema.  - Emphasized the importance of movement for reducing edema.    CONSTIPATION:  - Noted patient reports ongoing constipation.  - Advised patient to continue managing constipation with milk of magnesia, probiotics, and prebiotics.    HEADACHES:  - Noted patient reports frequent headaches.  - Advised patient to continue occasional use of acetaminophen for headaches.    ELEVATED LIVER FUNCTION TESTS:  - Evaluated elevated liver function test, considering recent viral illness and acetaminophen use as potential causes.  - Discussed liver function tests and potential causes of elevation.  - Ordered liver function tests today.  - Instructed patient to follow up when contacted regarding liver function test results.  - Noted the elevation in liver enzyme levels is new.  - Planned to recheck liver function and consider ultrasound if still elevated.    HYPERLIPIDEMIA:  - Noted patient's cholesterol levels have worsened compared to previous years.  - Observed increase in LDL cholesterol and decrease in HDL cholesterol.    ELEVATED VITAMIN D:  - Evaluated vitamin D levels, found to be elevated due to excessive supplementation.  - Explained risks of excessive vitamin D supplementation, including potential toxicity and associated symptoms.  - Decreased vitamin D supplementation from 10,000 IU daily to no more than 5,000 IU daily.  - Recommend further reduction to 2,000 IU daily.    BLOOD GLUCOSE CONCERNS:  - Reviewed recent lab results, noting low fasting glucose (65 mg/dL) and previous elevated reading (139 mg/dL), raising concern for po  tential prediabetes despite normal A1C.  - Explained potential symptoms of hypoglycemia and importance of quick carbohydrate intake if feeling shaky or jittery when fasting.  - Advised  patient to be cautious about low blood sugar symptoms.  - Ordered A1C test for 6 months from now.  - Scheduled follow up in 6 months for A1C recheck.  - Ms. Hayes to be cautious of symptoms related to low blood sugar, especially when fasting.        Tachycardia    Elevated AST (SGOT)  -     Hepatic Function Panel; Future; Expected date: 03/05/2025    Hyperglycemia  -     Hemoglobin A1C; Future; Expected date: 09/01/2025      Medication List with Changes/Refills   Current Medications    ACETAMINOPHEN (TYLENOL) 500 MG TABLET    Take 500 mg by mouth as needed.    ACETYLCYSTEINE (NAC ORAL)    Take 1,000 mg by mouth Daily.    ANTIARTHRITIC COMBINATION NO.2 900 MG TAB    Take 2 tablets by mouth once.    ASHWAGANDHA ROOT EXTRACT 300 MG CAP    Take by mouth.    ASTAXANTHIN 12 MG CAP    Take 12 mg by mouth Daily.    BIMATOPROST (LATISSE) 0.03 % OPHTHALMIC SOLUTION    Place one drop on applicator and apply evenly along the skin of the upper eyelid at base of eyelashes once daily at bedtime; repeat procedure for second eye (use a clean applicator).    BLUEBERRY FLAVOR MISC    by Misc.(Non-Drug; Combo Route) route.    CHOLECALCIFEROL, VITAMIN D3, (VITAMIN D3) 25 MCG (1,000 UNIT) CAPSULE    Take by mouth.    CO-ENZYME Q-10 50 MG CAPSULE    Take 100 mg by mouth once daily.    COCONUT OIL ORAL    Take by mouth.    DHA-PHOSPHATIDYLSERINE ORAL    Take 300 capsules by mouth.    ESTRADIOL (VIVELLE-DOT) 0.075 MG/24 HR    Place onto the skin.    GARLIC ORAL    Take by mouth.    GLYCINE ORAL    Take 1,000 mg by mouth Daily.    MAGNESIUM HYDROXIDE 1,200 MG CHEW    Take by mouth.    MAGNESIUM HYDROXIDE 400 MG/5 ML (MILK OF MAGNESIA) 400 MG/5 ML SUSP    Take 30 mLs by mouth once daily.    MULTIVIT WITH MINERALS/LUTEIN (MULTIVITAMIN 50 PLUS ORAL)    Take 1 tablet by mouth.    MULTIVIT-MIN/FOLIC AC/COLLAGEN (WOMEN'S MULTIVITAMIN COLLAGEN ORAL)    Take by mouth.    OLIVE OIL EXTERNAL OIL    Apply topically as needed.    OMEGA 3-DHA-EPA-FISH  -150-750 MG CAP    Take 1 capsule by mouth.    PASSION FLOWER ORAL    Take by mouth.    PHOSPHATIDYL SERINE, BULK, MISC    300 mg by Misc.(Non-Drug; Combo Route) route 2 (two) times a day.    PROGESTERONE (PROMETRIUM) 100 MG CAPSULE    Take 200 mg by mouth nightly.    PROPRANOLOL (INDERAL) 10 MG TABLET    Take 1 tablet (10 mg total) by mouth 3 (three) times daily as needed (for palpitations, HR > 100).    SOYBEAN, FERMENTED (NATTOKINASE ORAL)    Take 200 mg by mouth 2 (two) times a day.    TAURINE 1,000 MG CAP    Take 1,000 mg by mouth 2 (two) times a day.    TURMERIC 400 MG CAP    Take by mouth.    UNABLE TO FIND    Take 1,000 mg by mouth Daily. Lion's pooja    UNABLE TO FIND    Take 1,250 mg by mouth Daily. Black Seed Oil    UNABLE TO FIND    Sulforaphane    UNABLE TO FIND    Advanced Memory Formula    UNABLE TO FIND    Take 200 mg by mouth 2 (two) times a day. L-theanine    VIT B COMPLEX 100 NO.2/HERBS (VITAMIN B COMPLEX 100  2-HERBS ORAL)    Take 1 tablet by mouth.    VITAMIN D (VITAMIN D3) 1000 UNITS TAB    Take by mouth.    VITAMIN K2 ORAL    Take by mouth.       This note was generated with the assistance of ambient listening technology. Verbal consent was obtained by the patient and accompanying visitor(s) for the recording of patient appointment to facilitate this note. I attest to having reviewed and edited the generated note for accuracy, though some syntax or spelling errors may persist. Please contact the author of this note for any clarification.            [1]   Social History  Socioeconomic History    Marital status:    Tobacco Use    Smoking status: Never    Smokeless tobacco: Never   Substance and Sexual Activity    Alcohol use: Yes     Alcohol/week: 1.0 standard drink of alcohol     Types: 1 Glasses of wine per week     Comment: occas    Drug use: Never    Sexual activity: Not Currently     Partners: Male     Birth control/protection: Post-menopausal

## 2025-03-06 ENCOUNTER — TELEPHONE (OUTPATIENT)
Dept: INTERNAL MEDICINE | Facility: CLINIC | Age: 80
End: 2025-03-06
Payer: MEDICARE

## 2025-03-06 DIAGNOSIS — R74.01 ELEVATED AST (SGOT): Primary | ICD-10-CM

## 2025-03-06 NOTE — TELEPHONE ENCOUNTER
"----- Message from Priyanka sent at 3/6/2025  1:53 PM CST -----  Regarding: Missing Samples after transporting to [facility]  The following patients will need new orders put in [facility] and will need new appointments for recollections. The samples are "lost" after being picked up from [facility]. I know this is an inconvenience for the patient and you all. We are still investigating what happened after they left the [facility] draw site. Hepatic Function Panel  "

## 2025-03-07 ENCOUNTER — LAB VISIT (OUTPATIENT)
Dept: LAB | Facility: HOSPITAL | Age: 80
End: 2025-03-07
Attending: FAMILY MEDICINE
Payer: MEDICARE

## 2025-03-07 DIAGNOSIS — R74.01 ELEVATED AST (SGOT): ICD-10-CM

## 2025-03-07 PROCEDURE — 36415 COLL VENOUS BLD VENIPUNCTURE: CPT | Mod: PO | Performed by: FAMILY MEDICINE

## 2025-03-07 PROCEDURE — 80076 HEPATIC FUNCTION PANEL: CPT | Performed by: FAMILY MEDICINE

## 2025-03-08 LAB
ALBUMIN SERPL BCP-MCNC: 3.8 G/DL (ref 3.5–5.2)
ALP SERPL-CCNC: 48 U/L (ref 40–150)
ALT SERPL W/O P-5'-P-CCNC: 15 U/L (ref 10–44)
AST SERPL-CCNC: 54 U/L (ref 10–40)
BILIRUB DIRECT SERPL-MCNC: 0.1 MG/DL (ref 0.1–0.3)
BILIRUB SERPL-MCNC: 0.2 MG/DL (ref 0.1–1)
PROT SERPL-MCNC: 7.1 G/DL (ref 6–8.4)

## 2025-03-10 ENCOUNTER — RESULTS FOLLOW-UP (OUTPATIENT)
Dept: INTERNAL MEDICINE | Facility: CLINIC | Age: 80
End: 2025-03-10

## 2025-03-10 DIAGNOSIS — R74.01 ELEVATED AST (SGOT): Primary | ICD-10-CM

## 2025-03-11 ENCOUNTER — TELEPHONE (OUTPATIENT)
Dept: CARDIOLOGY | Facility: CLINIC | Age: 80
End: 2025-03-11
Payer: MEDICARE

## 2025-03-11 NOTE — TELEPHONE ENCOUNTER
Ok will review and discuss at follow up visit, if severe symptoms recommend ER and cardiology consult there if needed. Thanks   The patient has been notified of this information and all questions answered.  LM message confirming appt         HR is shooting up above 120 when changes clothes- any other exertion she feels funny in her chest and test results showed some heart block - so pt has questions    Appt made next week        ----- Message from Sruthi sent at 3/11/2025  2:40 PM CDT -----  Contact: self  Patient is requesting a call back regarding appt on 4/29 - asking for a sooner appt. Please call back at [phone]

## 2025-03-13 ENCOUNTER — RESULTS FOLLOW-UP (OUTPATIENT)
Dept: INTERNAL MEDICINE | Facility: CLINIC | Age: 80
End: 2025-03-13

## 2025-03-13 ENCOUNTER — HOSPITAL ENCOUNTER (OUTPATIENT)
Dept: RADIOLOGY | Facility: HOSPITAL | Age: 80
Discharge: HOME | End: 2025-03-13
Attending: FAMILY MEDICINE
Payer: MEDICARE

## 2025-03-13 DIAGNOSIS — R74.01 ELEVATED AST (SGOT): ICD-10-CM

## 2025-03-13 PROCEDURE — 76700 US EXAM ABDOM COMPLETE: CPT | Mod: TC

## 2025-03-13 PROCEDURE — 76700 US EXAM ABDOM COMPLETE: CPT | Mod: 26,,, | Performed by: RADIOLOGY

## 2025-03-20 ENCOUNTER — LAB VISIT (OUTPATIENT)
Dept: LAB | Facility: HOSPITAL | Age: 80
End: 2025-03-20
Attending: INTERNAL MEDICINE
Payer: MEDICARE

## 2025-03-20 ENCOUNTER — OFFICE VISIT (OUTPATIENT)
Dept: CARDIOLOGY | Facility: CLINIC | Age: 80
End: 2025-03-20
Payer: MEDICARE

## 2025-03-20 VITALS
DIASTOLIC BLOOD PRESSURE: 72 MMHG | SYSTOLIC BLOOD PRESSURE: 124 MMHG | BODY MASS INDEX: 24.88 KG/M2 | OXYGEN SATURATION: 98 % | HEART RATE: 98 BPM | WEIGHT: 144.94 LBS

## 2025-03-20 DIAGNOSIS — K58.9 IRRITABLE BOWEL SYNDROME, UNSPECIFIED TYPE: ICD-10-CM

## 2025-03-20 DIAGNOSIS — D50.9 IRON DEFICIENCY ANEMIA, UNSPECIFIED IRON DEFICIENCY ANEMIA TYPE: ICD-10-CM

## 2025-03-20 DIAGNOSIS — R41.3 MEMORY LOSS: ICD-10-CM

## 2025-03-20 DIAGNOSIS — R06.02 SOB (SHORTNESS OF BREATH): ICD-10-CM

## 2025-03-20 DIAGNOSIS — I65.23 BILATERAL CAROTID ARTERY STENOSIS: ICD-10-CM

## 2025-03-20 DIAGNOSIS — R09.89 CAROTID BRUIT, UNSPECIFIED LATERALITY: ICD-10-CM

## 2025-03-20 DIAGNOSIS — R42 VERTIGO: ICD-10-CM

## 2025-03-20 DIAGNOSIS — R06.09 OTHER FORM OF DYSPNEA: ICD-10-CM

## 2025-03-20 DIAGNOSIS — R07.9 CHEST PAIN, UNSPECIFIED TYPE: ICD-10-CM

## 2025-03-20 DIAGNOSIS — R00.0 TACHYCARDIA: Primary | ICD-10-CM

## 2025-03-20 DIAGNOSIS — F41.9 ANXIETY: ICD-10-CM

## 2025-03-20 DIAGNOSIS — K21.9 GASTROESOPHAGEAL REFLUX DISEASE, UNSPECIFIED WHETHER ESOPHAGITIS PRESENT: ICD-10-CM

## 2025-03-20 LAB
FERRITIN SERPL-MCNC: 300 NG/ML (ref 20–300)
IRON SERPL-MCNC: 114 UG/DL (ref 30–160)
SATURATED IRON: 41 % (ref 20–50)
TOTAL IRON BINDING CAPACITY: 278 UG/DL (ref 250–450)
TRANSFERRIN SERPL-MCNC: 188 MG/DL (ref 200–375)

## 2025-03-20 PROCEDURE — 99215 OFFICE O/P EST HI 40 MIN: CPT | Mod: S$GLB,,, | Performed by: INTERNAL MEDICINE

## 2025-03-20 PROCEDURE — 3074F SYST BP LT 130 MM HG: CPT | Mod: CPTII,S$GLB,, | Performed by: INTERNAL MEDICINE

## 2025-03-20 PROCEDURE — 99999 PR PBB SHADOW E&M-EST. PATIENT-LVL V: CPT | Mod: PBBFAC,,, | Performed by: INTERNAL MEDICINE

## 2025-03-20 PROCEDURE — 1126F AMNT PAIN NOTED NONE PRSNT: CPT | Mod: CPTII,S$GLB,, | Performed by: INTERNAL MEDICINE

## 2025-03-20 PROCEDURE — G2211 COMPLEX E/M VISIT ADD ON: HCPCS | Mod: S$GLB,,, | Performed by: INTERNAL MEDICINE

## 2025-03-20 PROCEDURE — 36415 COLL VENOUS BLD VENIPUNCTURE: CPT | Performed by: INTERNAL MEDICINE

## 2025-03-20 PROCEDURE — 3288F FALL RISK ASSESSMENT DOCD: CPT | Mod: CPTII,S$GLB,, | Performed by: INTERNAL MEDICINE

## 2025-03-20 PROCEDURE — 1101F PT FALLS ASSESS-DOCD LE1/YR: CPT | Mod: CPTII,S$GLB,, | Performed by: INTERNAL MEDICINE

## 2025-03-20 PROCEDURE — 84466 ASSAY OF TRANSFERRIN: CPT | Performed by: INTERNAL MEDICINE

## 2025-03-20 PROCEDURE — 3078F DIAST BP <80 MM HG: CPT | Mod: CPTII,S$GLB,, | Performed by: INTERNAL MEDICINE

## 2025-03-20 PROCEDURE — 1159F MED LIST DOCD IN RCRD: CPT | Mod: CPTII,S$GLB,, | Performed by: INTERNAL MEDICINE

## 2025-03-20 PROCEDURE — 1160F RVW MEDS BY RX/DR IN RCRD: CPT | Mod: CPTII,S$GLB,, | Performed by: INTERNAL MEDICINE

## 2025-03-20 PROCEDURE — 82728 ASSAY OF FERRITIN: CPT | Performed by: INTERNAL MEDICINE

## 2025-03-20 RX ORDER — PROPRANOLOL HYDROCHLORIDE 60 MG/1
60 CAPSULE, EXTENDED RELEASE ORAL NIGHTLY
Qty: 90 CAPSULE | Refills: 1 | Status: SHIPPED | OUTPATIENT
Start: 2025-03-20 | End: 2026-03-20

## 2025-03-20 RX ORDER — PRAVASTATIN SODIUM 20 MG/1
20 TABLET ORAL NIGHTLY
Qty: 90 TABLET | Refills: 1 | Status: SHIPPED | OUTPATIENT
Start: 2025-03-20 | End: 2026-03-20

## 2025-03-20 RX ORDER — ASPIRIN 81 MG/1
81 TABLET ORAL DAILY
Qty: 90 TABLET | Refills: 3 | Status: SHIPPED | OUTPATIENT
Start: 2025-03-20 | End: 2026-03-20

## 2025-03-20 RX ORDER — METOPROLOL TARTRATE 50 MG/1
50 TABLET ORAL
Qty: 3 TABLET | Refills: 0 | Status: SHIPPED | OUTPATIENT
Start: 2025-03-20 | End: 2026-03-20

## 2025-03-20 RX ORDER — NITROGLYCERIN 0.4 MG/1
0.4 TABLET SUBLINGUAL EVERY 5 MIN PRN
Qty: 30 TABLET | Refills: 0 | Status: SHIPPED | OUTPATIENT
Start: 2025-03-20 | End: 2026-03-20

## 2025-03-20 NOTE — PATIENT INSTRUCTIONS
If heart rate greater than 60 bpm:  metoprolol tartrate 50 mg: Take one 50 mg tab PO 1 hr prior to CTA.  Bring second tab with you to the procedure and take only if instructed by the nurse. Dispense 2 tab.  If heart rate greater than 75 bpm:  metoprolol tartrate 100 mg: Take two 50 mg tab PO 1 hr prior to CTA. Bring third tab with you to the procedure and take only if instructed by the nurse. Dispense 3 tab or ivabradine 7.5 mg PO 2 hr prior to CTA. Dispense 1 tab

## 2025-03-20 NOTE — PROGRESS NOTES
"Subjective:   Patient ID:  Jannet Hayes is a 79 y.o. female who presents for cardiac consult of Chest Pain, Dizziness, and Shortness of Breath (PT stated she has had chest pain when walking mild pain)      Referral by: No referring provider defined for this encounter.     Reason for consult:       HPI  The patient came in today for cardiac consult of Chest Pain, Dizziness, and Shortness of Breath (PT stated she has had chest pain when walking mild pain)      Jannet Hayes is a 79 y.o. female h/o vertigo, tremors, irritable bowel syndrome here for CV follow up.       1/12/2025, 5:47 PM  History obtained from the patient                  History of Present Illness: Jannet Hayes is a 79 y.o. female patient with a PMHx of skin cancer, IBS, and transient global amnesia who presents to the Emergency Department for evaluation of elevated HR which onset suddenly PTA. Pt reports that she could "hear heart beating" her ears, which prompted the pt to take her HR, which was 176.  She also reports that her blood pressure was elevated.  Pt reports that she had the urge to pass 2-3 bowel movements today which is unusual. Pt sees Dr. Mcdermott (Cardiology). Pt is not on any prescribed medications. Symptoms have resolved spontaneously.  No mitigating or exacerbating factors reported. Associated sxs include dizziness, lightheadedness, palpitations, and SOB. Patient denies any N/V, abd pain, back pain, fever, fatigue, vision changes, CP, slurred speech, facial droop, weakness, and all other sxs at this time. No prior Tx. No further complaints or concerns at this time.       1/16/25  Pt had recent ER eval for elevated HR, dizziness,presyncope.   Pt seen by Dr. Mcdermott last year here for follow up.     ECHO 6/2022 with normal bi V function, mild TR.   ECG stress test neg for ischemia 6/2022  Vital monitor in 2022 was neg overall.   BP and HR stable today. BMI 25  Her pulse ox at home revealed  bpm. She had mild SOB " with talking.       3/20/25  ECHO 2/2025 with normal bi V function and valves, PASP 28 mmHg.   ECG stress test 2/2025 neg for ischemia but less than 3 min exercise times with HTN response noted, artifact/SVT noted.   Vital monitor 2/2025 with AVG HR 78 bpm, rare rhythms noted; pt events correlate with sinus tachy V rate max 137 bpm.   Carotids  2/2025 with mod b/l stenosis 40-49%.   She had elevated LFTs, normal liver u/s.  Pt thought Propronolol would cause insomnia, discussed     FH - mother - stroke, father - MI    Results for orders placed during the hospital encounter of 02/06/25    Echo    Interpretation Summary    Left Ventricle: The left ventricle is normal in size. Ventricular mass is normal. Normal wall thickness. There is concentric remodeling. There is normal systolic function with a visually estimated ejection fraction of 55 - 60%. There is normal diastolic function.    Right Ventricle: Normal right ventricular cavity size. Wall thickness is normal. Systolic function is normal.    Pulmonary Artery: The estimated pulmonary artery systolic pressure is 28 mmHg.    IVC/SVC: Normal venous pressure at 3 mmHg.      Interpretation Summary  Show Result Comparison     There is 40-49% right Internal Carotid Stenosis.    There is 40-49% left Internal Carotid Stenosis.    Results for orders placed during the hospital encounter of 02/06/25    Exercise Stress - EKG    Interpretation Summary    The ECG portion of the study is negative for ischemia.    The patient reported no chest pain during the stress test.    The exercise capacity was moderately impaired.    The patient exercised for 2 minutes 49 seconds on a Fazal protocol, achieving a peak heart rate of 142 bpm, which is 103% of the age predicted maximum heart rate. Their exercise capacity was moderately impaired.    Lots of artifact svt    Stress ECG: There is hypertensive blood pressure response with stress.      Sinus tachycardia   Cannot rule out Anterior  infarct ,age undetermined   Abnormal ECG   When compared with ECG of 20-Mar-2024 10:20,   No significant change was found   Confirmed by Jimena Hong ([id number]) on 1/13/2025 3:35:36 PM         Cardiac Monitor - 3-15 Day Adult (Cupid Only)  Result Date: 2/20/2025    The predominant rhythm is sinus.    The patient was monitored for a total of 6d 5h, underlying rhythm is   Sinus.  The minimum heart rate was 55 bpm; the maximum 137 bpm; the average 78   bpm.  0 % of Atrial fibrillation/Atrial flutter with longest episode of 0 ms.  The total burden of AV Block present was 0 % [Complete Heart Block: 0 %;   Advanced (High Grade):  0 %; 2nd Degree, Mobitz II: 0 %; 2nd Degree, Mobitz I: 0 %].  There were 0 pauses, the longest pause was 0 ms at --.  Total count of Ventricular Tachycardia (VT): 1 episode(s). Longest VT: 3   beats on Day 5 / 04:27:42  pm. Fastest Ventricular Run: 101 bpm on Day 5 / 04:27:42 pm. Total Count   of Ventricular Episodes  <100bpm: 0 episode(s). Longest Ventricular Event <100bpm: 0 s on --  8 supraventricular episodes were found. Longest SVT Episode 13 beats,   Fastest  bpm  There were a total of 9 PVCs with 1 morphologies and 0 couplets. Overall   PVC [place] at < 0.01 %  There were a total of 0 Other Beats. There were 0 total number of paced   beats.  There were a total of 218 PSVCs with 7 couplets. Overall PSVC [place] at  0.03 %  There is a total of 18 patient events.  These correlated with NSR to sinus tachycardia from  bpm               Past Medical History:   Diagnosis Date    Amnesia, global, transient 2019    Chronic constipation     Concussion 2020    History of IBS 2016    Skin cancer     Tremors of nervous system 2020    Vertigo 2020       Past Surgical History:   Procedure Laterality Date    ADENOIDECTOMY      APPENDECTOMY      AUGMENTATION OF BREAST  2008    silicone    COSMETIC SURGERY      EYE SURGERY      LUMBAR LAMINECTOMY WITH SURGICAL REMOVAL OF LESION OF SPINAL CORD BY  POSTERIOR APPROACH  1999       Social History     Tobacco Use    Smoking status: Never    Smokeless tobacco: Never   Substance Use Topics    Alcohol use: Yes     Alcohol/week: 1.0 standard drink of alcohol     Types: 1 Glasses of wine per week     Comment: occas    Drug use: Never       Family History   Problem Relation Name Age of Onset    Diabetes Mother Roz     Stroke Mother Roz     Lymphoma Mother Roz     Arthritis Mother Roz     Cancer Mother Roz     Vision loss Mother Roz     Heart disease Father Darron        Patient's Medications   New Prescriptions    ASPIRIN (ECOTRIN) 81 MG EC TABLET    Take 1 tablet (81 mg total) by mouth once daily.    METOPROLOL TARTRATE (LOPRESSOR) 50 MG TABLET    Take 1 tablet (50 mg total) by mouth as needed (for heart rate over 60).    PRAVASTATIN (PRAVACHOL) 20 MG TABLET    Take 1 tablet (20 mg total) by mouth every evening.    PROPRANOLOL (INDERAL LA) 60 MG 24 HR CAPSULE    Take 1 capsule (60 mg total) by mouth every evening.   Previous Medications    ACETAMINOPHEN (TYLENOL) 500 MG TABLET    Take 500 mg by mouth as needed.    ACETYLCYSTEINE (NAC ORAL)    Take 1,000 mg by mouth Daily.    ANTIARTHRITIC COMBINATION NO.2 900 MG TAB    Take 2 tablets by mouth once.    ASHWAGANDHA ROOT EXTRACT 300 MG CAP    Take by mouth.    ASTAXANTHIN 12 MG CAP    Take 12 mg by mouth Daily.    BIMATOPROST (LATISSE) 0.03 % OPHTHALMIC SOLUTION    Place one drop on applicator and apply evenly along the skin of the upper eyelid at base of eyelashes once daily at bedtime; repeat procedure for second eye (use a clean applicator).    BLUEBERRY FLAVOR MISC    by Misc.(Non-Drug; Combo Route) route.    CHOLECALCIFEROL, VITAMIN D3, (VITAMIN D3) 25 MCG (1,000 UNIT) CAPSULE    Take by mouth.    CO-ENZYME Q-10 50 MG CAPSULE    Take 100 mg by mouth once daily.    COCONUT OIL ORAL    Take by mouth.    DHA-PHOSPHATIDYLSERINE ORAL    Take 300 capsules by mouth.    ESTRADIOL (VIVELLE-DOT) 0.075 MG/24 HR     Place onto the skin.    GARLIC ORAL    Take by mouth.    GLYCINE ORAL    Take 1,000 mg by mouth Daily.    MAGNESIUM HYDROXIDE 1,200 MG CHEW    Take by mouth.    MAGNESIUM HYDROXIDE 400 MG/5 ML (MILK OF MAGNESIA) 400 MG/5 ML SUSP    Take 30 mLs by mouth once daily.    MULTIVIT WITH MINERALS/LUTEIN (MULTIVITAMIN 50 PLUS ORAL)    Take 1 tablet by mouth.    MULTIVIT-MIN/FOLIC AC/COLLAGEN (WOMEN'S MULTIVITAMIN COLLAGEN ORAL)    Take by mouth.    OLIVE OIL EXTERNAL OIL    Apply topically as needed.    OMEGA 3-DHA-EPA-FISH -150-750 MG CAP    Take 1 capsule by mouth.    PASSION FLOWER ORAL    Take by mouth.    PHOSPHATIDYL SERINE, BULK, MISC    300 mg by Misc.(Non-Drug; Combo Route) route 2 (two) times a day.    PROGESTERONE (PROMETRIUM) 100 MG CAPSULE    Take 200 mg by mouth nightly.    SOYBEAN, FERMENTED (NATTOKINASE ORAL)    Take 200 mg by mouth 2 (two) times a day.    TAURINE 1,000 MG CAP    Take 1,000 mg by mouth 2 (two) times a day.    TURMERIC 400 MG CAP    Take by mouth.    UNABLE TO FIND    Take 1,000 mg by mouth Daily. Lion's pooja    UNABLE TO FIND    Take 1,250 mg by mouth Daily. Black Seed Oil    UNABLE TO FIND    Sulforaphane    UNABLE TO FIND    Advanced Memory Formula    UNABLE TO FIND    Take 200 mg by mouth 2 (two) times a day. L-theanine    VIT B COMPLEX 100 NO.2/HERBS (VITAMIN B COMPLEX 100  2-HERBS ORAL)    Take 1 tablet by mouth.    VITAMIN D (VITAMIN D3) 1000 UNITS TAB    Take by mouth.    VITAMIN K2 ORAL    Take by mouth.   Modified Medications    No medications on file   Discontinued Medications    PROPRANOLOL (INDERAL) 10 MG TABLET    Take 1 tablet (10 mg total) by mouth 3 (three) times daily as needed (for palpitations, HR > 100).       Review of Systems   Constitutional:  Positive for malaise/fatigue.   HENT: Negative.     Eyes: Negative.    Respiratory:  Positive for shortness of breath.    Cardiovascular:  Positive for palpitations.   Gastrointestinal: Negative.    Genitourinary:  Negative.    Musculoskeletal: Negative.    Skin: Negative.    Neurological:  Positive for dizziness, focal weakness and weakness.   Endo/Heme/Allergies: Negative.    Psychiatric/Behavioral: Negative.     All 12 systems otherwise negative.      Wt Readings from Last 3 Encounters:   03/20/25 65.8 kg (144 lb 15.2 oz)   03/05/25 65.4 kg (144 lb 2.9 oz)   02/25/25 66.2 kg (145 lb 15.1 oz)     Temp Readings from Last 3 Encounters:   03/05/25 97.8 °F (36.6 °C) (Tympanic)   02/25/25 98.2 °F (36.8 °C) (Tympanic)   01/12/25 98.3 °F (36.8 °C) (Oral)     BP Readings from Last 3 Encounters:   03/20/25 124/72   03/05/25 126/68   02/25/25 130/61     Pulse Readings from Last 3 Encounters:   03/20/25 98   03/05/25 88   02/25/25 84       /72 (BP Location: Left arm, Patient Position: Sitting)   Pulse 98   Wt 65.8 kg (144 lb 15.2 oz)   SpO2 98%   BMI 24.88 kg/m²     Objective:   Physical Exam  Vitals and nursing note reviewed.   Constitutional:       General: She is not in acute distress.     Appearance: She is well-developed. She is not diaphoretic.   HENT:      Head: Normocephalic and atraumatic.      Nose: Nose normal.   Eyes:      General: No scleral icterus.     Conjunctiva/sclera: Conjunctivae normal.   Neck:      Thyroid: No thyromegaly.      Vascular: No JVD.   Cardiovascular:      Rate and Rhythm: Normal rate and regular rhythm.      Heart sounds: S1 normal and S2 normal. No murmur heard.     No friction rub. No gallop. No S3 or S4 sounds.   Pulmonary:      Effort: Pulmonary effort is normal. No respiratory distress.      Breath sounds: Normal breath sounds. No stridor. No wheezing or rales.   Chest:      Chest wall: No tenderness.   Abdominal:      General: Bowel sounds are normal. There is no distension.      Palpations: Abdomen is soft. There is no mass.      Tenderness: There is no abdominal tenderness. There is no rebound.   Genitourinary:     Comments: Deferred  Musculoskeletal:         General: No tenderness  or deformity. Normal range of motion.      Cervical back: Normal range of motion and neck supple.   Lymphadenopathy:      Cervical: No cervical adenopathy.   Skin:     General: Skin is warm and dry.      Coloration: Skin is not pale.      Findings: No erythema or rash.   Neurological:      Mental Status: She is alert and oriented to person, place, and time.      Motor: No abnormal muscle tone.      Coordination: Coordination normal.   Psychiatric:         Behavior: Behavior normal.         Thought Content: Thought content normal.         Judgment: Judgment normal.         Lab Results   Component Value Date     02/25/2025    K 4.3 02/25/2025     02/25/2025    CO2 25 02/25/2025    BUN 16 02/25/2025    CREATININE 0.8 02/25/2025    GLU 65 (L) 02/25/2025    HGBA1C 5.1 02/25/2025    AST 54 (H) 03/07/2025    ALT 15 03/07/2025    ALBUMIN 3.8 03/07/2025    PROT 7.1 03/07/2025    BILITOT 0.2 03/07/2025    WBC 9.06 02/25/2025    HGB 15.3 02/25/2025    HCT 46.9 02/25/2025    MCV 94 02/25/2025     02/25/2025    TSH 2.610 01/12/2025    CHOL 202 (H) 02/25/2025    HDL 58 02/25/2025    LDLCALC 126.6 02/25/2025    TRIG 87 02/25/2025    BNP <10 01/12/2025         BNP (pg/mL)   Date Value   01/12/2025 <10   03/21/2022 <10          Assessment:      1. Tachycardia    2. Vertigo    3. SOB (shortness of breath)    4. Memory loss    5. Irritable bowel syndrome, unspecified type    6. Other form of dyspnea    7. Carotid bruit, unspecified laterality    8. Chest pain, unspecified type    9. Gastroesophageal reflux disease, unspecified whether esophagitis present    10. Bilateral carotid artery stenosis    11. Anxiety    12. Iron deficiency anemia, unspecified iron deficiency anemia type          Plan:     Dizziness, presyncope, vertigo, tachycardia, with CP/SOB  - prior CV testing neg - echo, stress, vital 2022  - cont propranolol PRN 10mg  --> change to long acting Propranolol 60mg QHS  -ECHO 2/2025 with normal bi V  function and valves, PASP 28 mmHg.   -ECG stress test 2/2025 neg for ischemia but less than 3 min exercise times with HTN response noted, artifact/SVT noted.   -Vital monitor 2/2025 with AVG HR 78 bpm, rare rhythms noted; pt events correlate with sinus tachy V rate max 137 bpm.   - referred to ENT  - order pharm Nuclear stress test, pt cannot walk on treadmill further     -order CTA coronaries -     Beta blocker prep for coronary CTAs, instruct the patient as follows:   If heart rate greater than 60 bpm:  metoprolol tartrate 50 mg: Take one 50 mg tab PO 1 hr prior to CTA.  Bring second tab with you to the procedure and take only if instructed by the nurse. Dispense 2 tab.  If heart rate greater than 75 bpm:  metoprolol tartrate 100 mg: Take two 50 mg tab PO 1 hr prior to CTA. Bring third tab with you to the procedure and take only if instructed by the nurse. Dispense 3 tab or ivabradine 7.5 mg PO 2 hr prior to CTA. Dispense 1 tab  If heart rate is greater than or equal to 85 bpm or irregular:  please contact the Radiology Department  Caution if BP is less than or equal to 110/70:  metoprolol tartrate (LOPRESSOR) 50 mg tablet Normal, Disp-2 tablet, R-0; metoprolol tartrate (LOPRESSOR) 50 mg tablet Normal, Disp-3 tablet, R-0; ivabradine (CORLANOR) Normal, Disp-1 tablet, R-0    2. IBS  - cont tx per PCP/GI    3. Vit D def, memory loss  - cont supplements    4. Anxiety  - was on meds in past    5. Carotid artery disease  -Carotids  2/2025 with mod b/l stenosis 40-49%.   - start -  asa and statin if liver function labs are normal  - start Pravastatin 20mg   - cont to monitor      Visit today included increased complexity associated with the care of the episodic problem tachycardia addressed and managing the longitudinal care of the patient due to the serious and/or complex managed problem(s) .        Thank you for allowing me to participate in this patient's care. Please do not hesitate to contact me with any questions  or concerns. Consult note has been forwarded to the referral physician.

## 2025-03-21 ENCOUNTER — RESULTS FOLLOW-UP (OUTPATIENT)
Dept: CARDIOLOGY | Facility: CLINIC | Age: 80
End: 2025-03-21

## 2025-03-21 ENCOUNTER — TELEPHONE (OUTPATIENT)
Dept: CARDIOLOGY | Facility: CLINIC | Age: 80
End: 2025-03-21
Payer: MEDICARE

## 2025-03-21 NOTE — TELEPHONE ENCOUNTER
Spoke with patient to advise that per JENNIE Deluna: Iron studies stable in normal rangeIron studies stable in normal range. Patient voiced understanding.    ----- Message from JENNIE Deluna sent at 3/21/2025  8:09 AM CDT -----  Iron studies stable in normal range    Thanks  JENNIE Deluna    ----- Message -----  From: Gabriel, [facility] Interface  Sent: 3/20/2025   7:25 PM CDT  To: Bill Harkins MD

## 2025-03-26 ENCOUNTER — OFFICE VISIT (OUTPATIENT)
Dept: OBSTETRICS AND GYNECOLOGY | Facility: CLINIC | Age: 80
End: 2025-03-26
Payer: MEDICARE

## 2025-03-26 VITALS
SYSTOLIC BLOOD PRESSURE: 122 MMHG | HEIGHT: 64 IN | BODY MASS INDEX: 24.65 KG/M2 | WEIGHT: 144.38 LBS | DIASTOLIC BLOOD PRESSURE: 72 MMHG

## 2025-03-26 DIAGNOSIS — Z79.890 POST-MENOPAUSE ON HRT (HORMONE REPLACEMENT THERAPY): Primary | ICD-10-CM

## 2025-03-26 DIAGNOSIS — Z12.31 ENCOUNTER FOR SCREENING MAMMOGRAM FOR BREAST CANCER: ICD-10-CM

## 2025-03-26 PROCEDURE — 1126F AMNT PAIN NOTED NONE PRSNT: CPT | Mod: CPTII,S$GLB,, | Performed by: NURSE PRACTITIONER

## 2025-03-26 PROCEDURE — 99999 PR PBB SHADOW E&M-EST. PATIENT-LVL V: CPT | Mod: PBBFAC,,, | Performed by: NURSE PRACTITIONER

## 2025-03-26 PROCEDURE — 3074F SYST BP LT 130 MM HG: CPT | Mod: CPTII,S$GLB,, | Performed by: NURSE PRACTITIONER

## 2025-03-26 PROCEDURE — 1159F MED LIST DOCD IN RCRD: CPT | Mod: CPTII,S$GLB,, | Performed by: NURSE PRACTITIONER

## 2025-03-26 PROCEDURE — 1160F RVW MEDS BY RX/DR IN RCRD: CPT | Mod: CPTII,S$GLB,, | Performed by: NURSE PRACTITIONER

## 2025-03-26 PROCEDURE — 3078F DIAST BP <80 MM HG: CPT | Mod: CPTII,S$GLB,, | Performed by: NURSE PRACTITIONER

## 2025-03-26 PROCEDURE — 99203 OFFICE O/P NEW LOW 30 MIN: CPT | Mod: S$GLB,,, | Performed by: NURSE PRACTITIONER

## 2025-03-26 PROCEDURE — 1101F PT FALLS ASSESS-DOCD LE1/YR: CPT | Mod: CPTII,S$GLB,, | Performed by: NURSE PRACTITIONER

## 2025-03-26 PROCEDURE — 3288F FALL RISK ASSESSMENT DOCD: CPT | Mod: CPTII,S$GLB,, | Performed by: NURSE PRACTITIONER

## 2025-03-26 RX ORDER — ESTRADIOL 0.05 MG/D
1 FILM, EXTENDED RELEASE TRANSDERMAL WEEKLY
Qty: 12 PATCH | Refills: 3 | Status: SHIPPED | OUTPATIENT
Start: 2025-03-26 | End: 2025-03-27 | Stop reason: SDUPTHER

## 2025-03-26 NOTE — PROGRESS NOTES
"Jannet Hayes is a 79 y.o. female  presents with complaint of needs refill on estrogen patch.  Has the progesterone just not the patch.   Was seen by gyn last year out of [facility] system and had pap - pathology in chart - normal  Pt has been on estrogen patch and progesterone - aware of vascular risk factors and wants to continue even with her current cardiac risk factors     No LMP recorded. Patient is postmenopausal.    Past Medical History:   Diagnosis Date    Amnesia, global, transient     Chronic constipation     Concussion     History of IBS 2016    Skin cancer     Tremors of nervous system     Vertigo      Past Surgical History:   Procedure Laterality Date    ADENOIDECTOMY      APPENDECTOMY      AUGMENTATION OF BREAST      silicone    COSMETIC SURGERY      EYE SURGERY      LUMBAR LAMINECTOMY WITH SURGICAL REMOVAL OF LESION OF SPINAL CORD BY POSTERIOR APPROACH       Social History[1]  Family History   Problem Relation Name Age of Onset    Diabetes Mother Roz     Stroke Mother Roz     Lymphoma Mother Roz     Arthritis Mother Roz     Cancer Mother Roz     Vision loss Mother Roz     Heart disease Father Darron      OB History    Para Term  AB Living   1 1 1      SAB IAB Ectopic Multiple Live Births             # Outcome Date GA Lbr Rohan/2nd Weight Sex Type Anes PTL Lv   1 Term                /72   Ht 5' 4" (1.626 m)   Wt 65.5 kg (144 lb 6.4 oz)   BMI 24.79 kg/m²     ROS:  Per hpi    PHYSICAL EXAM:  examination not indicated  Physical Exam     ASSESSMENT and PLAN:  1. Post-menopause on HRT (hormone replacement therapy)  estradiol 0.05 mg/24 hr td ptsw (VIVELLE-DOT) 0.05 mg/24 hr      2. Encounter for screening mammogram for breast cancer  Mammo Digital Screening Rachel w/ Rei (XPD)          Jannet was seen today for well woman.    Diagnoses and all orders for this visit:    Post-menopause on HRT (hormone replacement therapy)  -     estradiol " 0.05 mg/24 hr td ptsw (VIVELLE-DOT) 0.05 mg/24 hr; Place 1 patch onto the skin once a week.    Encounter for screening mammogram for breast cancer  -     Mammo Digital Screening Bilat w/ Rei (XPD); Future         A full discussion of the benefit-risk ratio of hormonal replacement therapy was carried out. Improvement in vasomotor and other climacteric symptoms is discussed, including possible improvements in sleep and mood. Reduction of risk for osteoporosis was explained. We discussed the study data showing increased risk of thrombo-embolic events such as myocardial infarction, stroke and also possibly breast cancer with estrogen replacement, and how this might affect her. The range of side effects such as breast tenderness, weight gain and including possible increases in lifetime risk of breast cancer and possible thrombotic complications was discussed. We also discussed ACOG's recommendation to use hormone replacement therapy for the relief of hot flashes alone and to be on the lowest dose possible for the shortest amount of time.  Alternative such as herbal and soy-based products were reviewed. All of her questions about this therapy were answered.       Pt acknowledges and is aware of her risk with taking estrogen and progesterone at her age and cardiac history - she would like to continue even with risk factors        [1]   Social History  Tobacco Use    Smoking status: Never    Smokeless tobacco: Never   Substance Use Topics    Alcohol use: Yes     Alcohol/week: 1.0 standard drink of alcohol     Types: 1 Glasses of wine per week     Comment: occas    Drug use: Never

## 2025-03-27 ENCOUNTER — TELEPHONE (OUTPATIENT)
Dept: OBSTETRICS AND GYNECOLOGY | Facility: CLINIC | Age: 80
End: 2025-03-27
Payer: MEDICARE

## 2025-03-27 DIAGNOSIS — Z79.890 POST-MENOPAUSE ON HRT (HORMONE REPLACEMENT THERAPY): ICD-10-CM

## 2025-03-27 RX ORDER — ESTRADIOL 0.05 MG/D
FILM, EXTENDED RELEASE TRANSDERMAL
Qty: 12 PATCH | Refills: 3 | Status: SHIPPED | OUTPATIENT
Start: 2025-03-27

## 2025-03-27 NOTE — TELEPHONE ENCOUNTER
----- Message from Alem sent at 3/27/2025 12:32 PM CDT -----  Contact: Phyllis  Type:  Pharmacy Calling to Clarify an RXName of Caller:Pharmacy Name: [facility] Prescription Name:estradiol 0.05 mg/24 hr td ptsw (VIVELLE-DOT) 0.05 mg/24 hrWhat do they need to clarify?: directions Best Call Back Number: [phone] Additional Information:  is requesting a callback from the nurse today in regard to

## 2025-03-27 NOTE — TELEPHONE ENCOUNTER
----- Message from Alem sent at 3/27/2025 12:32 PM CDT -----  Contact: Phyllis  Type:  Pharmacy Calling to Clarify an RXName of Caller:Pharmacy Name: [facility] Prescription Name:estradiol 0.05 mg/24 hr td ptsw (VIVELLE-DOT) 0.05 mg/24 hrWhat do they need to clarify?: directions Best Call Back Number: [phone] Additional Information:  is requesting a callback from the nurse today in regard to

## 2025-03-31 ENCOUNTER — OFFICE VISIT (OUTPATIENT)
Dept: PULMONOLOGY | Facility: CLINIC | Age: 80
End: 2025-03-31
Payer: MEDICARE

## 2025-03-31 VITALS
DIASTOLIC BLOOD PRESSURE: 70 MMHG | BODY MASS INDEX: 24.48 KG/M2 | HEART RATE: 84 BPM | RESPIRATION RATE: 18 BRPM | WEIGHT: 143.38 LBS | OXYGEN SATURATION: 99 % | HEIGHT: 64 IN | SYSTOLIC BLOOD PRESSURE: 124 MMHG

## 2025-03-31 DIAGNOSIS — R05.3 CHRONIC COUGH: Primary | ICD-10-CM

## 2025-03-31 DIAGNOSIS — R06.02 SOB (SHORTNESS OF BREATH): ICD-10-CM

## 2025-03-31 PROCEDURE — 99999 PR PBB SHADOW E&M-EST. PATIENT-LVL V: CPT | Mod: PBBFAC,,, | Performed by: PHYSICIAN ASSISTANT

## 2025-03-31 NOTE — PROGRESS NOTES
Subjective:       Patient ID: Jannet Hayes is a 79 y.o. female.    Chief Complaint: Shortness of Breath      New patient referred by cardiologist for shortness of breath  SOB triggered by heart racing - any kind of exertion triggers this; was prescribed propranolol but not taking it  Right sided nasal congestion  Intermittent wheeze on exhalation  Intermittent cough for 3 years, no sputum production  Denies allergic rhinitis  Denies dysphagia, voice changes, or weight loss  No history of lung disease or pneumonia  Normal CXRs  No known triggering event for start of cough  Does have itchy skin and eyes?  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  [] Snoring    [x]  Tired/Fatigued/Sleepy  [] Obstruction (apneas/choking)  [] Pressure (HTN)  [] BMI >35  [x] Age >50  [x] Neck >40 cm  [] Gender male   STOP-Bang = 3 (low risk 0-2,high risk 3-8)          Immunization History   Administered Date(s) Administered    Tdap 09/03/2022      Tobacco Use: Low Risk  (3/31/2025)    Patient History     Smoking Tobacco Use: Never     Smokeless Tobacco Use: Never     Passive Exposure: Not on file      Past Medical History:   Diagnosis Date    Amnesia, global, transient 2019    Chronic constipation     Concussion 2020    History of IBS 2016    Skin cancer   "   Tremors of nervous system 2020    Vertigo 2020      Medications Ordered Prior to Encounter[1]     Review of Systems   Constitutional:  Negative for fever, weight loss, appetite change, fatigue and weakness.   HENT:  Negative for postnasal drip, rhinorrhea, sinus pressure, trouble swallowing and congestion.    Respiratory:  Positive for cough, dyspnea on extertion and somnolence. Negative for sputum production, choking, chest tightness, shortness of breath and wheezing.    Cardiovascular:  Positive for palpitations. Negative for chest pain and leg swelling.   Musculoskeletal:  Negative for arthralgias, gait problem and joint swelling.   Gastrointestinal:  Negative for nausea, vomiting and abdominal pain.   Neurological:  Negative for dizziness, weakness and headaches.   All other systems reviewed and are negative.      Objective:       Vitals:    03/31/25 1309   BP: 124/70   Patient Position: Sitting   Pulse: 84   Resp: 18   SpO2: 99%   Weight: 65 kg (143 lb 6.2 oz)   Height: 5' 4" (1.626 m)       Physical Exam   Constitutional: She is oriented to person, place, and time. She appears well-developed and well-nourished. No distress.   HENT:   Head: Normocephalic.   Mouth/Throat: Oropharynx is clear and moist. Mallampati Score: IV.   Cardiovascular: Normal rate and regular rhythm.   Pulmonary/Chest: Effort normal. No respiratory distress. She has no wheezes. She has no rhonchi. She has no rales.   Musculoskeletal:         General: No edema.      Cervical back: Normal range of motion and neck supple.   Neurological: She is alert and oriented to person, place, and time. Gait normal.   Skin: Skin is warm and dry.   Psychiatric: She has a normal mood and affect.   Vitals reviewed.    Personal Diagnostic Review    US Abdomen Complete  Narrative: EXAMINATION:  US ABDOMEN COMPLETE    CLINICAL HISTORY:  Elevation of levels of liver transaminase levels    TECHNIQUE:  Complete abdominal ultrasound (including pancreas, aorta, " liver, gallbladder, common bile duct, IVC, kidneys, and spleen) was performed.    COMPARISON:  None    FINDINGS:  Pancreas: The visualized portions of pancreas appear normal.    Aorta: No aneurysm.    Liver: 10.5 cm.  Homogeneous parenchymal echotexture. No focal lesions.    Gallbladder: No calculi, wall thickening, or pericholecystic fluid.  Negative sonographic Gagnon's sign.    Biliary system: 2 mm common bile duct.  No intrahepatic ductal dilatation.    Inferior vena cava: Normal in appearance.    Right kidney: 9.2 cm. No hydronephrosis.    Left kidney: 9.2 cm. No hydronephrosis.    Spleen: Obscured by overlying bowel gas.    Miscellaneous: No ascites.  Impression: No significant finding.  Exam slightly limited by overlying bowel gas.    Electronically signed by: Andre Guadalupe  Date:    2025  Time:    13:59            Assessment/Plan:       1. Chronic cough  Comments:  will get PFT, walk, ABG  will wait on Chest CT until after PFTs and CTA  discussed possible SUSAN eval in near future  Orders:  -     Complete PFT with bronchodilator; Future  -     Stress test, pulmonary; Future; Expected date: 2025  -     PFT - related Arterial Blood Gas; Future  -     Cancel: CT Chest Without Contrast; Future; Expected date: 2025  -     Cancel: Fraction of  Nitric Oxide; Future    2. SOB (shortness of breath)  -     Ambulatory referral/consult to Pulmonology  -     Complete PFT with bronchodilator; Future  -     Stress test, pulmonary; Future; Expected date: 2025  -     PFT - related Arterial Blood Gas; Future        Follow up in 4 weeks (on 2025) for  pft, walk, abg next available .    Discussed diagnosis, its evaluation, treatment and usual course. All questions answered.    Patient verbalized understanding of plan and left in no acute distress    Thank you for the courtesy of participating in the care of this patient    Elizabeth G Blough, PA-C [facility]         [1]   Current  Outpatient Medications on File Prior to Visit   Medication Sig Dispense Refill    acetaminophen (TYLENOL) 500 MG tablet Take 500 mg by mouth as needed.      acetylcysteine (NAC ORAL) Take 1,000 mg by mouth Daily.      antiarthritic combination no.2 900 mg Tab Take 2 tablets by mouth once.      ashwagandha root extract 300 mg Cap Take by mouth.      aspirin (ECOTRIN) 81 MG EC tablet Take 1 tablet (81 mg total) by mouth once daily. 90 tablet 3    astaxanthin 12 mg Cap Take 12 mg by mouth Daily.      bimatoprost (LATISSE) 0.03 % ophthalmic solution Place one drop on applicator and apply evenly along the skin of the upper eyelid at base of eyelashes once daily at bedtime; repeat procedure for second eye (use a clean applicator). 5 mL 11    BLUEBERRY FLAVOR MISC by Misc.(Non-Drug; Combo Route) route.      cholecalciferol, vitamin D3, (VITAMIN D3) 25 mcg (1,000 unit) capsule Take by mouth.      co-enzyme Q-10 50 mg capsule Take 100 mg by mouth once daily.      COCONUT OIL ORAL Take by mouth.      DHA-PHOSPHATIDYLSERINE ORAL Take 300 capsules by mouth.      estradiol 0.05 mg/24 hr td ptsw (VIVELLE-DOT) 0.05 mg/24 hr Place one patch twice weekly 12 patch 3    GARLIC ORAL Take by mouth.      GLYCINE ORAL Take 1,000 mg by mouth Daily.      magnesium hydroxide 1,200 mg Chew Take by mouth.      magnesium hydroxide 400 mg/5 ml (MILK OF MAGNESIA) 400 mg/5 mL Susp Take 30 mLs by mouth once daily.      metoprolol tartrate (LOPRESSOR) 50 MG tablet Take 1 tablet (50 mg total) by mouth as needed (for heart rate over 60). 3 tablet 0    multivit with minerals/lutein (MULTIVITAMIN 50 PLUS ORAL) Take 1 tablet by mouth.      multivit-min/folic ac/collagen (WOMEN'S MULTIVITAMIN COLLAGEN ORAL) Take by mouth.      nitroGLYCERIN (NITROSTAT) 0.4 MG SL tablet Place 1 tablet (0.4 mg total) under the tongue every 5 (five) minutes as needed for Chest pain. After 2nd dose recommend ER eval - call EMS 30 tablet 0    olive oil external oil Apply  topically as needed.      omega 3-dha-epa-fish oil 100-150-750 mg Cap Take 1 capsule by mouth.      PASSION FLOWER ORAL Take by mouth.      PHOSPHATIDYL SERINE, BULK, MISC 300 mg by Misc.(Non-Drug; Combo Route) route 2 (two) times a day.      pravastatin (PRAVACHOL) 20 MG tablet Take 1 tablet (20 mg total) by mouth every evening. 90 tablet 1    progesterone (PROMETRIUM) 100 MG capsule Take 200 mg by mouth nightly.      propranoloL (INDERAL LA) 60 MG 24 hr capsule Take 1 capsule (60 mg total) by mouth every evening. 90 capsule 1    soybean, fermented (NATTOKINASE ORAL) Take 200 mg by mouth 2 (two) times a day.      taurine 1,000 mg Cap Take 1,000 mg by mouth 2 (two) times a day.      turmeric 400 mg Cap Take by mouth.      UNABLE TO FIND Take 1,000 mg by mouth Daily. Lion's pooja      UNABLE TO FIND Take 1,250 mg by mouth Daily. Black Seed Oil      UNABLE TO FIND Sulforaphane      UNABLE TO FIND Advanced Memory Formula      UNABLE TO FIND Take 200 mg by mouth 2 (two) times a day. L-theanine      vit B complex 100 no.2/herbs (VITAMIN B COMPLEX 100  2-HERBS ORAL) Take 1 tablet by mouth.      vitamin D (VITAMIN D3) 1000 units Tab Take by mouth.      VITAMIN K2 ORAL Take by mouth.       No current facility-administered medications on file prior to visit.

## 2025-04-08 ENCOUNTER — PATIENT MESSAGE (OUTPATIENT)
Dept: CARDIOLOGY | Facility: HOSPITAL | Age: 80
End: 2025-04-08
Payer: MEDICARE

## 2025-04-08 ENCOUNTER — LAB VISIT (OUTPATIENT)
Dept: LAB | Facility: HOSPITAL | Age: 80
End: 2025-04-08
Attending: INTERNAL MEDICINE
Payer: MEDICARE

## 2025-04-08 ENCOUNTER — TELEPHONE (OUTPATIENT)
Dept: CARDIOLOGY | Facility: HOSPITAL | Age: 80
End: 2025-04-08
Payer: MEDICARE

## 2025-04-08 DIAGNOSIS — R07.9 CHEST PAIN, UNSPECIFIED TYPE: Primary | ICD-10-CM

## 2025-04-08 DIAGNOSIS — R07.9 CHEST PAIN, UNSPECIFIED TYPE: ICD-10-CM

## 2025-04-08 LAB
ANION GAP (OHS): 13 MMOL/L (ref 8–16)
BUN SERPL-MCNC: 14 MG/DL (ref 8–23)
CALCIUM SERPL-MCNC: 9.3 MG/DL (ref 8.7–10.5)
CHLORIDE SERPL-SCNC: 102 MMOL/L (ref 95–110)
CO2 SERPL-SCNC: 24 MMOL/L (ref 23–29)
CREAT SERPL-MCNC: 0.7 MG/DL (ref 0.5–1.4)
GFR SERPLBLD CREATININE-BSD FMLA CKD-EPI: >60 ML/MIN/1.73/M2
GLUCOSE SERPL-MCNC: 87 MG/DL (ref 70–110)
POTASSIUM SERPL-SCNC: 3.5 MMOL/L (ref 3.5–5.1)
SODIUM SERPL-SCNC: 139 MMOL/L (ref 136–145)

## 2025-04-08 PROCEDURE — 36415 COLL VENOUS BLD VENIPUNCTURE: CPT

## 2025-04-08 PROCEDURE — 80048 BASIC METABOLIC PNL TOTAL CA: CPT

## 2025-04-08 RX ORDER — IVABRADINE 7.5 MG/1
15 TABLET, FILM COATED ORAL
Qty: 2 TABLET | Refills: 0 | Status: SHIPPED | OUTPATIENT
Start: 2025-04-08

## 2025-04-08 RX ORDER — IVABRADINE 7.5 MG/1
15 TABLET, FILM COATED ORAL
COMMUNITY
End: 2025-04-08 | Stop reason: SDUPTHER

## 2025-04-08 NOTE — TELEPHONE ENCOUNTER
I had the pleasure of discussing your upcoming Cardiac CTA scheduled for 04/10/2025 at 2:00. To review, we discussed showing up 15-20 minutes before your appointment time. Your test is located at [facility] on the corner of [address] and [address], address [address], [place] [address], next door to [employer].     I have reviewed your current medications that you take and I've discussed the Cardiac CTA prep for heart rate management with Dr. Zepeda, the interpreting MD. He agrees with the Metoprolol tartrate (Lopressor) and is ordering for you to take 100mg of it 2-hours prior to the CTA in addition to the 15mg of Ivabradine (Corlanor) 2-hours prior to the CTA as well. Again, the goal for this is to maintain a desired heart rate of ~60 beats/minute for the duration of the test--about 40 minutes. This helps for the overall study quality and clarity. The Ivabradine was sent to the [facility].     We discussed needing to have some blood work completed prior to the CTA to ensure that your renal function is stable enough to receive the IV contrast needed for imaging. This can be done as a walk-in with no appointment necessary at your nearest [facility] facility. I have messaged Dr. Harkins to have this order placed.    Reminder for a 4-hour fasting time, no caffeine the AM of, and you can have water, anywhere from 16-32 ounces before and after completion of the test. It is advisable to have someone transport you to and from the test as a safety precaution. Thank you again for your time today. For any questions or concerns: I am available M-F from 7:30-4, please call [phone].

## 2025-04-09 ENCOUNTER — TELEPHONE (OUTPATIENT)
Dept: CARDIOLOGY | Facility: CLINIC | Age: 80
End: 2025-04-09
Payer: MEDICARE

## 2025-04-09 NOTE — TELEPHONE ENCOUNTER
----- Message from Gris sent at 4/8/2025 12:49 PM CDT -----  Regarding: Return Call  Contact: patient  Type:  Patient Returning CallWho Called:Jannet Who Left Message for Patient: patient Does the patient know what this is regarding?: authorization date for CT Heart ScanWould the patient rather a call back or a response via [facility]? Call back Best Call Back Number: [phone] (home) Additional Information: The procedure code [place] [address] and please return call at [facility] [phone].  They have the start date as of 04/30/2025 and the procedure is 04/10/2025.Thanks,SJ

## 2025-04-09 NOTE — TELEPHONE ENCOUNTER
Patient states she is scheduled for CT can on 4/10/25 and was told the procedure code needs to be corrected.

## 2025-04-09 NOTE — TELEPHONE ENCOUNTER
Spoke with Diana with [facility] [phone]. Diana states authorization has been approved for CT scan auth# [id number] from 4/10/ 25 to 4/11/25.

## 2025-04-09 NOTE — TELEPHONE ENCOUNTER
Patient has been advised CT scan has been approved and given PA #. Patient repeated back correctly and verbalizes understanding.

## 2025-04-10 ENCOUNTER — TELEPHONE (OUTPATIENT)
Dept: CARDIOLOGY | Facility: CLINIC | Age: 80
End: 2025-04-10
Payer: MEDICARE

## 2025-04-10 ENCOUNTER — HOSPITAL ENCOUNTER (OUTPATIENT)
Dept: RADIOLOGY | Facility: HOSPITAL | Age: 80
Discharge: HOME | End: 2025-04-10
Attending: INTERNAL MEDICINE
Payer: MEDICARE

## 2025-04-10 VITALS
DIASTOLIC BLOOD PRESSURE: 68 MMHG | HEART RATE: 67 BPM | SYSTOLIC BLOOD PRESSURE: 125 MMHG | OXYGEN SATURATION: 98 % | RESPIRATION RATE: 16 BRPM

## 2025-04-10 DIAGNOSIS — R07.9 CHEST PAIN, UNSPECIFIED TYPE: ICD-10-CM

## 2025-04-10 PROCEDURE — 75574 CT ANGIO HRT W/3D IMAGE: CPT | Mod: TC

## 2025-04-10 PROCEDURE — 25000242 PHARM REV CODE 250 ALT 637 W/ HCPCS: Performed by: INTERNAL MEDICINE

## 2025-04-10 PROCEDURE — 25500020 PHARM REV CODE 255: Performed by: INTERNAL MEDICINE

## 2025-04-10 PROCEDURE — 25000003 PHARM REV CODE 250: Performed by: INTERNAL MEDICINE

## 2025-04-10 PROCEDURE — 75574 CT ANGIO HRT W/3D IMAGE: CPT | Mod: 26,,, | Performed by: STUDENT IN AN ORGANIZED HEALTH CARE EDUCATION/TRAINING PROGRAM

## 2025-04-10 RX ORDER — METOPROLOL TARTRATE 1 MG/ML
5 INJECTION, SOLUTION INTRAVENOUS EVERY 5 MIN PRN
Status: DISCONTINUED | OUTPATIENT
Start: 2025-04-10 | End: 2025-04-11 | Stop reason: HOSPADM

## 2025-04-10 RX ORDER — NITROGLYCERIN 0.4 MG/1
0.4 TABLET SUBLINGUAL ONCE
Status: COMPLETED | OUTPATIENT
Start: 2025-04-10 | End: 2025-04-10

## 2025-04-10 RX ADMIN — IOHEXOL 100 ML: 350 INJECTION, SOLUTION INTRAVENOUS at 02:04

## 2025-04-10 RX ADMIN — METOPROLOL TARTRATE 5 MG: 5 INJECTION, SOLUTION INTRAVENOUS at 02:04

## 2025-04-10 RX ADMIN — METOPROLOL TARTRATE 5 MG: 5 INJECTION, SOLUTION INTRAVENOUS at 01:04

## 2025-04-10 RX ADMIN — NITROGLYCERIN 0.4 MG: 0.4 TABLET SUBLINGUAL at 02:04

## 2025-04-10 NOTE — NURSING
Pt arrived to [facility] for cardiac CTA.  Pt AAOx4, ambulatory, no distress noted. Pt informed of procedure, need for PIV and side effects of contrast and nitroglycerin.  Pt connected for continuous vital sign monitoring. VS assessed and put in flowsheets.  IV in place.  Pt verbalizes understanding.

## 2025-04-10 NOTE — TELEPHONE ENCOUNTER
she should follow his directions yes - if she cannot or does not want to then we would have to cancel the test    lets just see how she does with the nuclear stress test,     if that is normal/negative then maybe we do not need this CTA also    The patient has been notified of this information and all questions answered.    Pt will talk it over with  and make a decision shortly to either do the CTA or she will cancel it with Talha Wiley                   Was told to take metoprolol and corlanor hour before by us but Talha from imaging told her 2 hours before  -- pt is nervous not sure what to do :  1. Taking beta blockers that she has never taken before and having to drive to [place] under new medication - would it drop her heart rate too much to where she couldn't drive  2. Does she take it 1 hour before or 2 hours before the testing    Please advise:  Test scheduled today @ 2p in [place]  Please call pt ASAP

## 2025-04-10 NOTE — NURSING
Pt VSS post CTA study. Pt exhibiting no adverse effects from medication. Pt able to adequately ambulate with no dizziness or SOB. IV removed, site dressed with guaze and coban. [facility] home. Pt educated on post study instructions. Verbalized understanding.

## 2025-04-17 ENCOUNTER — TELEPHONE (OUTPATIENT)
Dept: CARDIOLOGY | Facility: CLINIC | Age: 80
End: 2025-04-17
Payer: MEDICARE

## 2025-04-17 NOTE — TELEPHONE ENCOUNTER
Contacted PT and reviewed results for the CTA PT stated verbal understanding    ----- Message from Mery sent at 4/17/2025 12:17 PM CDT -----  Contact: Jannet Miller is calling in regards to some questions regarding the procedure on 04/10 and 04/30.please call pt back at [phone]

## 2025-04-30 ENCOUNTER — HOSPITAL ENCOUNTER (OUTPATIENT)
Dept: RADIOLOGY | Facility: HOSPITAL | Age: 80
Discharge: HOME | End: 2025-04-30
Attending: INTERNAL MEDICINE
Payer: MEDICARE

## 2025-04-30 ENCOUNTER — HOSPITAL ENCOUNTER (OUTPATIENT)
Dept: PULMONOLOGY | Facility: HOSPITAL | Age: 80
Discharge: HOME | End: 2025-04-30
Attending: INTERNAL MEDICINE
Payer: MEDICARE

## 2025-04-30 VITALS
SYSTOLIC BLOOD PRESSURE: 157 MMHG | HEART RATE: 72 BPM | WEIGHT: 143 LBS | DIASTOLIC BLOOD PRESSURE: 91 MMHG | HEIGHT: 64 IN | BODY MASS INDEX: 24.41 KG/M2

## 2025-04-30 DIAGNOSIS — K58.9 IRRITABLE BOWEL SYNDROME, UNSPECIFIED TYPE: ICD-10-CM

## 2025-04-30 DIAGNOSIS — R41.3 MEMORY LOSS: ICD-10-CM

## 2025-04-30 DIAGNOSIS — D50.9 IRON DEFICIENCY ANEMIA, UNSPECIFIED IRON DEFICIENCY ANEMIA TYPE: ICD-10-CM

## 2025-04-30 DIAGNOSIS — R00.0 TACHYCARDIA: ICD-10-CM

## 2025-04-30 DIAGNOSIS — R07.9 CHEST PAIN, UNSPECIFIED TYPE: ICD-10-CM

## 2025-04-30 DIAGNOSIS — K21.9 GASTROESOPHAGEAL REFLUX DISEASE, UNSPECIFIED WHETHER ESOPHAGITIS PRESENT: ICD-10-CM

## 2025-04-30 DIAGNOSIS — F41.9 ANXIETY: ICD-10-CM

## 2025-04-30 DIAGNOSIS — R06.02 SOB (SHORTNESS OF BREATH): ICD-10-CM

## 2025-04-30 DIAGNOSIS — R06.09 OTHER FORM OF DYSPNEA: ICD-10-CM

## 2025-04-30 DIAGNOSIS — R42 VERTIGO: ICD-10-CM

## 2025-04-30 DIAGNOSIS — I65.23 BILATERAL CAROTID ARTERY STENOSIS: ICD-10-CM

## 2025-04-30 DIAGNOSIS — R09.89 CAROTID BRUIT, UNSPECIFIED LATERALITY: ICD-10-CM

## 2025-04-30 LAB
CV STRESS BASE HR: 73 BPM
DIASTOLIC BLOOD PRESSURE: 91 MMHG
EJECTION FRACTION- HIGH: 73 %
END DIASTOLIC INDEX-HIGH: 165 ML/M2
END DIASTOLIC INDEX-LOW: 101 ML/M2
END SYSTOLIC INDEX-HIGH: 64 ML/M2
END SYSTOLIC INDEX-LOW: 28 ML/M2
NUC REST EJECTION FRACTION: 82
NUC STRESS EJECTION FRACTION: 81 %
OHS CV CPX 85 PERCENT MAX PREDICTED HEART RATE MALE: 122
OHS CV CPX MAX PREDICTED HEART RATE: 143
OHS CV CPX PATIENT IS FEMALE: 1
OHS CV CPX PATIENT IS MALE: 0
OHS CV CPX PEAK DIASTOLIC BLOOD PRESSURE: 91 MMHG
OHS CV CPX PEAK HEAR RATE: 116 BPM
OHS CV CPX PEAK RATE PRESSURE PRODUCT: NORMAL
OHS CV CPX PEAK SYSTOLIC BLOOD PRESSURE: 157 MMHG
OHS CV CPX PERCENT MAX PREDICTED HEART RATE ACHIEVED: 84
OHS CV CPX RATE PRESSURE PRODUCT PRESENTING: NORMAL
OHS CV INITIAL DOSE: 9.1 MCG/KG/MIN
OHS CV PEAK DOSE: 32.4 MCG/KG/MIN
RETIRED EF AND QEF - SEE NOTES: 59 %
SYSTOLIC BLOOD PRESSURE: 157 MMHG

## 2025-04-30 PROCEDURE — 78452 HT MUSCLE IMAGE SPECT MULT: CPT | Mod: 26,,, | Performed by: INTERNAL MEDICINE

## 2025-04-30 PROCEDURE — 78452 HT MUSCLE IMAGE SPECT MULT: CPT

## 2025-04-30 PROCEDURE — 93016 CV STRESS TEST SUPVJ ONLY: CPT | Mod: ,,, | Performed by: INTERNAL MEDICINE

## 2025-04-30 PROCEDURE — A9502 TC99M TETROFOSMIN: HCPCS | Performed by: INTERNAL MEDICINE

## 2025-04-30 PROCEDURE — 63600175 PHARM REV CODE 636 W HCPCS: Performed by: INTERNAL MEDICINE

## 2025-04-30 PROCEDURE — 93018 CV STRESS TEST I&R ONLY: CPT | Mod: ,,, | Performed by: INTERNAL MEDICINE

## 2025-04-30 RX ORDER — REGADENOSON 0.08 MG/ML
0.4 INJECTION, SOLUTION INTRAVENOUS ONCE
Status: COMPLETED | OUTPATIENT
Start: 2025-04-30 | End: 2025-04-30

## 2025-04-30 RX ADMIN — TETROFOSMIN 32.4 MILLICURIE: 1.38 INJECTION, POWDER, LYOPHILIZED, FOR SOLUTION INTRAVENOUS at 11:04

## 2025-04-30 RX ADMIN — TETROFOSMIN 9.1 MILLICURIE: 1.38 INJECTION, POWDER, LYOPHILIZED, FOR SOLUTION INTRAVENOUS at 10:04

## 2025-04-30 RX ADMIN — REGADENOSON 0.4 MG: 0.08 INJECTION, SOLUTION INTRAVENOUS at 11:04

## 2025-05-12 ENCOUNTER — TELEPHONE (OUTPATIENT)
Dept: PULMONOLOGY | Facility: CLINIC | Age: 80
End: 2025-05-12
Payer: MEDICARE

## 2025-05-12 NOTE — TELEPHONE ENCOUNTER
Returned patients call back. Patient stated she needed to reschedule her appts to the next available date. Staff offered appts and patient accepted and expressed understanding----- Message from Mary sent at 5/12/2025  3:16 PM CDT -----  Contact: Jannet  Type:  Reschedule Apoointment RequestName of Caller:Amaury ask to reschedule her appointments, all of them for the same date, , ABG and Walk tests. She ask if is [facility] LocationWould the patient rather a call back or a response via [facility]? Call [facility] Call Back Number:[phone]!

## 2025-06-03 ENCOUNTER — CLINICAL SUPPORT (OUTPATIENT)
Dept: PULMONOLOGY | Facility: CLINIC | Age: 80
End: 2025-06-03
Payer: MEDICARE

## 2025-06-03 ENCOUNTER — OFFICE VISIT (OUTPATIENT)
Dept: PULMONOLOGY | Facility: CLINIC | Age: 80
End: 2025-06-03
Payer: MEDICARE

## 2025-06-03 VITALS
WEIGHT: 143.31 LBS | DIASTOLIC BLOOD PRESSURE: 63 MMHG | BODY MASS INDEX: 24.46 KG/M2 | HEART RATE: 99 BPM | WEIGHT: 143.31 LBS | HEIGHT: 64 IN | BODY MASS INDEX: 24.46 KG/M2 | HEIGHT: 64 IN | OXYGEN SATURATION: 92 % | SYSTOLIC BLOOD PRESSURE: 123 MMHG | RESPIRATION RATE: 18 BRPM

## 2025-06-03 DIAGNOSIS — E87.3 METABOLIC ALKALOSIS: Primary | ICD-10-CM

## 2025-06-03 DIAGNOSIS — R06.02 SOB (SHORTNESS OF BREATH): ICD-10-CM

## 2025-06-03 DIAGNOSIS — R05.3 CHRONIC COUGH: ICD-10-CM

## 2025-06-03 LAB
ALLENS TEST: ABNORMAL
BRPFT: ABNORMAL
DELSYS: ABNORMAL
DLCO ADJ PRE: 15.51 ML/(MIN*MMHG) (ref 14.63–26.1)
DLCO SINGLE BREATH LLN: 14.63
DLCO SINGLE BREATH PRE REF: 76.2 %
DLCO SINGLE BREATH REF: 20.37
DLCOC SBVA LLN: 2.68
DLCOC SBVA PRE REF: 114.6 %
DLCOC SBVA REF: 4.1
DLCOC SINGLE BREATH LLN: 14.63
DLCOC SINGLE BREATH PRE REF: 76.2 %
DLCOC SINGLE BREATH REF: 20.37
DLCOVA LLN: 2.68
DLCOVA PRE REF: 114.6 %
DLCOVA PRE: 4.7 ML/(MIN*MMHG*L) (ref 2.68–5.51)
DLCOVA REF: 4.1
DLVAADJ PRE: 4.7 ML/(MIN*MMHG*L) (ref 2.68–5.51)
ERV LLN: -16449.45
ERV PRE REF: 100.2 %
ERV REF: 0.55
FEF 25 75 LLN: 0.94
FEF 25 75 PRE REF: 43.7 %
FEF 25 75 REF: 2.34
FEV1 FVC LLN: 63
FEV1 FVC PRE REF: 92 %
FEV1 FVC REF: 77
FEV1 LLN: 1.42
FEV1 PRE REF: 83.7 %
FEV1 REF: 2.01
FIO2: 21
FRCPLETH LLN: 1.91
FRCPLETH PREREF: 95.5 %
FRCPLETH REF: 2.73
FVC LLN: 1.86
FVC PRE REF: 89.9 %
FVC REF: 2.63
HCO3 UR-SCNC: 22.3 MMOL/L (ref 24–28)
IVC PRE: 1.99 L (ref 1.86–3.4)
IVC SINGLE BREATH LLN: 1.86
IVC SINGLE BREATH PRE REF: 75.6 %
IVC SINGLE BREATH REF: 2.63
MODE: ABNORMAL
MVV LLN: 65
MVV PRE REF: 57.5 %
MVV REF: 77
PCO2 BLDA: 28.4 MMHG (ref 35–45)
PEF LLN: 3.38
PEF PRE REF: 92.2 %
PEF REF: 5.1
PH SMN: 7.5 [PH] (ref 7.35–7.45)
PO2 BLDA: 99 MMHG (ref 80–100)
POC BE: -1 MMOL/L (ref -2–2)
POC SATURATED O2: 98 % (ref 95–100)
PRE DLCO: 15.51 ML/(MIN*MMHG) (ref 14.63–26.1)
PRE ERV: 0.55 L (ref -16449.45–16450.55)
PRE FEF 25 75: 1.02 L/S (ref 0.94–3.74)
PRE FET 100: 10.64 SEC
PRE FEV1 FVC: 71.09 % (ref 62.95–91.54)
PRE FEV1: 1.68 L (ref 1.42–2.6)
PRE FRC PL: 2.6 L
PRE FVC: 2.37 L (ref 1.86–3.4)
PRE MVV: 44 L/MIN (ref 65.06–88.02)
PRE PEF: 4.7 L/S (ref 3.38–6.82)
PRE RV: 1.83 L (ref 1.61–2.76)
PRE TLC: 4.2 L (ref 3.98–5.96)
RAW LLN: 3.06
RAW PRE REF: 103 %
RAW PRE: 3.15 CMH2O*S/L (ref 3.06–3.06)
RAW REF: 3.06
RV LLN: 1.61
RV PRE REF: 83.8 %
RV REF: 2.18
RVTLC LLN: 36
RVTLC PRE REF: 96.6 %
RVTLC PRE: 43.6 % (ref 35.55–54.73)
RVTLC REF: 45
SAMPLE: ABNORMAL
SITE: ABNORMAL
TLC LLN: 3.98
TLC PRE REF: 84.4 %
TLC REF: 4.97
VA PRE: 3.31 L (ref 4.82–4.82)
VA SINGLE BREATH LLN: 4.82
VA SINGLE BREATH PRE REF: 68.6 %
VA SINGLE BREATH REF: 4.82
VC LLN: 1.86
VC PRE REF: 89.9 %
VC PRE: 2.37 L (ref 1.86–3.4)
VC REF: 2.63
VTGRAWPRE: 2.81 L

## 2025-06-03 PROCEDURE — 99214 OFFICE O/P EST MOD 30 MIN: CPT | Mod: S$GLB,,, | Performed by: PHYSICIAN ASSISTANT

## 2025-06-03 PROCEDURE — 99999 PR PBB SHADOW E&M-EST. PATIENT-LVL I: CPT | Mod: PBBFAC,,,

## 2025-06-03 PROCEDURE — 1159F MED LIST DOCD IN RCRD: CPT | Mod: CPTII,S$GLB,, | Performed by: PHYSICIAN ASSISTANT

## 2025-06-03 PROCEDURE — 82803 BLOOD GASES ANY COMBINATION: CPT | Mod: S$GLB,,, | Performed by: INTERNAL MEDICINE

## 2025-06-03 PROCEDURE — 3288F FALL RISK ASSESSMENT DOCD: CPT | Mod: CPTII,S$GLB,, | Performed by: PHYSICIAN ASSISTANT

## 2025-06-03 PROCEDURE — 36600 WITHDRAWAL OF ARTERIAL BLOOD: CPT | Mod: 59,S$GLB,, | Performed by: INTERNAL MEDICINE

## 2025-06-03 PROCEDURE — 94618 PULMONARY STRESS TESTING: CPT | Mod: S$GLB,,, | Performed by: INTERNAL MEDICINE

## 2025-06-03 PROCEDURE — 3074F SYST BP LT 130 MM HG: CPT | Mod: CPTII,S$GLB,, | Performed by: PHYSICIAN ASSISTANT

## 2025-06-03 PROCEDURE — 1101F PT FALLS ASSESS-DOCD LE1/YR: CPT | Mod: CPTII,S$GLB,, | Performed by: PHYSICIAN ASSISTANT

## 2025-06-03 PROCEDURE — 94729 DIFFUSING CAPACITY: CPT | Mod: S$GLB,,, | Performed by: INTERNAL MEDICINE

## 2025-06-03 PROCEDURE — 3078F DIAST BP <80 MM HG: CPT | Mod: CPTII,S$GLB,, | Performed by: PHYSICIAN ASSISTANT

## 2025-06-03 PROCEDURE — 94010 BREATHING CAPACITY TEST: CPT | Mod: 59,S$GLB,, | Performed by: INTERNAL MEDICINE

## 2025-06-03 PROCEDURE — 94726 PLETHYSMOGRAPHY LUNG VOLUMES: CPT | Mod: S$GLB,,, | Performed by: INTERNAL MEDICINE

## 2025-06-03 PROCEDURE — 1126F AMNT PAIN NOTED NONE PRSNT: CPT | Mod: CPTII,S$GLB,, | Performed by: PHYSICIAN ASSISTANT

## 2025-06-03 PROCEDURE — 1160F RVW MEDS BY RX/DR IN RCRD: CPT | Mod: CPTII,S$GLB,, | Performed by: PHYSICIAN ASSISTANT

## 2025-06-03 PROCEDURE — 99999 PR PBB SHADOW E&M-EST. PATIENT-LVL V: CPT | Mod: PBBFAC,,, | Performed by: PHYSICIAN ASSISTANT

## 2025-06-04 PROBLEM — R06.02 SOB (SHORTNESS OF BREATH): Status: ACTIVE | Noted: 2025-06-04

## 2025-06-05 ENCOUNTER — TELEPHONE (OUTPATIENT)
Dept: PHARMACY | Facility: CLINIC | Age: 80
End: 2025-06-05
Payer: MEDICARE

## 2025-06-05 ENCOUNTER — HOSPITAL ENCOUNTER (EMERGENCY)
Facility: HOSPITAL | Age: 80
Discharge: HOME | End: 2025-06-06
Attending: EMERGENCY MEDICINE
Payer: MEDICARE

## 2025-06-05 ENCOUNTER — TELEPHONE (OUTPATIENT)
Dept: INTERNAL MEDICINE | Facility: CLINIC | Age: 80
End: 2025-06-05
Payer: MEDICARE

## 2025-06-05 DIAGNOSIS — R03.0 ELEVATED BLOOD PRESSURE READING WITHOUT DIAGNOSIS OF HYPERTENSION: ICD-10-CM

## 2025-06-05 DIAGNOSIS — R07.9 CHEST PAIN: ICD-10-CM

## 2025-06-05 DIAGNOSIS — R29.818 ACUTE FOCAL NEUROLOGICAL DEFICIT: ICD-10-CM

## 2025-06-05 DIAGNOSIS — R42 DIZZINESS: Primary | ICD-10-CM

## 2025-06-05 LAB
ABSOLUTE EOSINOPHIL (OHS): 0.07 K/UL
ABSOLUTE MONOCYTE (OHS): 0.95 K/UL (ref 0.3–1)
ABSOLUTE NEUTROPHIL COUNT (OHS): 10.79 K/UL (ref 1.8–7.7)
ALBUMIN SERPL BCP-MCNC: 3.8 G/DL (ref 3.5–5.2)
ALP SERPL-CCNC: 48 UNIT/L (ref 40–150)
ALT SERPL W/O P-5'-P-CCNC: 16 UNIT/L (ref 10–44)
ANION GAP (OHS): 13 MMOL/L (ref 8–16)
AST SERPL-CCNC: 20 UNIT/L (ref 11–45)
BASOPHILS # BLD AUTO: 0.09 K/UL
BASOPHILS NFR BLD AUTO: 0.6 %
BILIRUB SERPL-MCNC: 0.3 MG/DL (ref 0.1–1)
BNP SERPL-MCNC: <10 PG/ML (ref 0–99)
BUN SERPL-MCNC: 18 MG/DL (ref 8–23)
CALCIUM SERPL-MCNC: 9.6 MG/DL (ref 8.7–10.5)
CHLORIDE SERPL-SCNC: 105 MMOL/L (ref 95–110)
CO2 SERPL-SCNC: 19 MMOL/L (ref 23–29)
CREAT SERPL-MCNC: 0.9 MG/DL (ref 0.5–1.4)
ERYTHROCYTE [DISTWIDTH] IN BLOOD BY AUTOMATED COUNT: 13.8 % (ref 11.5–14.5)
GFR SERPLBLD CREATININE-BSD FMLA CKD-EPI: >60 ML/MIN/1.73/M2
GLUCOSE SERPL-MCNC: 107 MG/DL (ref 70–110)
HCT VFR BLD AUTO: 42.4 % (ref 37–48.5)
HGB BLD-MCNC: 14.1 GM/DL (ref 12–16)
HOLD SPECIMEN: NORMAL
IMM GRANULOCYTES # BLD AUTO: 0.07 K/UL (ref 0–0.04)
IMM GRANULOCYTES NFR BLD AUTO: 0.5 % (ref 0–0.5)
INR PPP: 0.9 (ref 0.8–1.2)
LYMPHOCYTES # BLD AUTO: 1.9 K/UL (ref 1–4.8)
MCH RBC QN AUTO: 30.5 PG (ref 27–31)
MCHC RBC AUTO-ENTMCNC: 33.3 G/DL (ref 32–36)
MCV RBC AUTO: 92 FL (ref 82–98)
NUCLEATED RBC (/100WBC) (OHS): 0 /100 WBC
PLATELET # BLD AUTO: 326 K/UL (ref 150–450)
PMV BLD AUTO: 9.7 FL (ref 9.2–12.9)
POCT GLUCOSE: 99 MG/DL (ref 70–110)
POTASSIUM SERPL-SCNC: 4.1 MMOL/L (ref 3.5–5.1)
PROT SERPL-MCNC: 7.6 GM/DL (ref 6–8.4)
PROTHROMBIN TIME: 10.3 SECONDS (ref 9–12.5)
RBC # BLD AUTO: 4.62 M/UL (ref 4–5.4)
RELATIVE EOSINOPHIL (OHS): 0.5 %
RELATIVE LYMPHOCYTE (OHS): 13.7 % (ref 18–48)
RELATIVE MONOCYTE (OHS): 6.8 % (ref 4–15)
RELATIVE NEUTROPHIL (OHS): 77.9 % (ref 38–73)
SODIUM SERPL-SCNC: 137 MMOL/L (ref 136–145)
TROPONIN I SERPL DL<=0.01 NG/ML-MCNC: <0.006 NG/ML
TSH SERPL-ACNC: 0.23 UIU/ML (ref 0.4–4)
WBC # BLD AUTO: 13.87 K/UL (ref 3.9–12.7)

## 2025-06-05 PROCEDURE — 27000221 HC OXYGEN, UP TO 24 HOURS

## 2025-06-05 PROCEDURE — 25000003 PHARM REV CODE 250: Performed by: EMERGENCY MEDICINE

## 2025-06-05 PROCEDURE — G0425 INPT/ED TELECONSULT30: HCPCS | Mod: 95,,, | Performed by: STUDENT IN AN ORGANIZED HEALTH CARE EDUCATION/TRAINING PROGRAM

## 2025-06-05 PROCEDURE — 83880 ASSAY OF NATRIURETIC PEPTIDE: CPT | Performed by: EMERGENCY MEDICINE

## 2025-06-05 PROCEDURE — 84443 ASSAY THYROID STIM HORMONE: CPT | Performed by: EMERGENCY MEDICINE

## 2025-06-05 PROCEDURE — 93010 ELECTROCARDIOGRAM REPORT: CPT | Mod: ,,, | Performed by: INTERNAL MEDICINE

## 2025-06-05 PROCEDURE — 93005 ELECTROCARDIOGRAM TRACING: CPT

## 2025-06-05 PROCEDURE — 25500020 PHARM REV CODE 255: Performed by: EMERGENCY MEDICINE

## 2025-06-05 PROCEDURE — 84436 ASSAY OF TOTAL THYROXINE: CPT | Performed by: EMERGENCY MEDICINE

## 2025-06-05 PROCEDURE — 84484 ASSAY OF TROPONIN QUANT: CPT | Performed by: EMERGENCY MEDICINE

## 2025-06-05 PROCEDURE — 99900035 HC TECH TIME PER 15 MIN (STAT)

## 2025-06-05 PROCEDURE — 36415 COLL VENOUS BLD VENIPUNCTURE: CPT | Performed by: EMERGENCY MEDICINE

## 2025-06-05 PROCEDURE — 85610 PROTHROMBIN TIME: CPT | Performed by: EMERGENCY MEDICINE

## 2025-06-05 PROCEDURE — 84075 ASSAY ALKALINE PHOSPHATASE: CPT | Performed by: EMERGENCY MEDICINE

## 2025-06-05 PROCEDURE — 94761 N-INVAS EAR/PLS OXIMETRY MLT: CPT

## 2025-06-05 PROCEDURE — 85025 COMPLETE CBC W/AUTO DIFF WBC: CPT | Performed by: EMERGENCY MEDICINE

## 2025-06-05 PROCEDURE — 99285 EMERGENCY DEPT VISIT HI MDM: CPT | Mod: 25

## 2025-06-05 PROCEDURE — 80061 LIPID PANEL: CPT | Performed by: EMERGENCY MEDICINE

## 2025-06-05 RX ORDER — ACETAMINOPHEN 325 MG/1
650 TABLET ORAL
Status: COMPLETED | OUTPATIENT
Start: 2025-06-05 | End: 2025-06-05

## 2025-06-05 RX ORDER — MECLIZINE HYDROCHLORIDE 25 MG/1
25 TABLET ORAL
Status: COMPLETED | OUTPATIENT
Start: 2025-06-05 | End: 2025-06-05

## 2025-06-05 RX ADMIN — IOHEXOL 100 ML: 350 INJECTION, SOLUTION INTRAVENOUS at 07:06

## 2025-06-05 RX ADMIN — MECLIZINE HYDROCHLORIDE 25 MG: 25 TABLET ORAL at 11:06

## 2025-06-05 RX ADMIN — ACETAMINOPHEN 650 MG: 325 TABLET ORAL at 10:06

## 2025-06-06 VITALS
DIASTOLIC BLOOD PRESSURE: 59 MMHG | BODY MASS INDEX: 21.18 KG/M2 | RESPIRATION RATE: 19 BRPM | HEIGHT: 66 IN | WEIGHT: 131.81 LBS | SYSTOLIC BLOOD PRESSURE: 116 MMHG | OXYGEN SATURATION: 99 % | HEART RATE: 86 BPM | TEMPERATURE: 99 F

## 2025-06-06 LAB
CHOLEST SERPL-MCNC: 221 MG/DL (ref 120–199)
CHOLEST/HDLC SERPL: 3 {RATIO} (ref 2–5)
HDLC SERPL-MCNC: 73 MG/DL (ref 40–75)
HDLC SERPL: 33 % (ref 20–50)
LDLC SERPL CALC-MCNC: 130.2 MG/DL (ref 63–159)
NONHDLC SERPL-MCNC: 148 MG/DL
OHS QRS DURATION: 78 MS
OHS QTC CALCULATION: 428 MS
T4 SERPL-MCNC: 7.4 UG/DL (ref 4.5–11.5)
TRIGL SERPL-MCNC: 89 MG/DL (ref 30–150)
TROPONIN I SERPL DL<=0.01 NG/ML-MCNC: <0.006 NG/ML

## 2025-06-06 RX ORDER — PROGESTERONE 100 MG/1
200 CAPSULE ORAL NIGHTLY
Qty: 180 CAPSULE | Refills: 0 | OUTPATIENT
Start: 2025-06-06

## 2025-06-06 RX ORDER — MECLIZINE HYDROCHLORIDE 25 MG/1
25 TABLET ORAL 3 TIMES DAILY PRN
Qty: 20 TABLET | Refills: 0 | Status: SHIPPED | OUTPATIENT
Start: 2025-06-06

## 2025-06-06 NOTE — ED PROVIDER NOTES
"Emergency Medicine Provider Note - 6/5/2025       CRIBE NOTE: I, Leni Robb, am scribing for, and in the presence of Sayra Quintanilla DO, FACEP. I have scribed HPI, ROS, and PEx.     SCRIBE #2 NOTE: I, Walter Juarez, am scribing for, and in the presence of,  Mir Norwood MD. I have scribed the remaining portions of the note not scribed by Scribe #1.      History     Chief Complaint   Patient presents with    Dizziness     Pt reports a wave of lightheadedness and dizziness that occurred while she was lying down at home and talking on the phone.  She took her BP shortly thereafter, and it was higher than usual.  Pt reports her dizziness has partially resolved at this time, and she continues to feel nauseated.       Allergies:  Review of patient's allergies indicates:   Allergen Reactions    Oxycodone-acetaminophen Anaphylaxis     HALLUCINATIONS    Penicillins Hives     ITCHING, SWELLING.  Patient states "can take Keflex without any problems".  Other reaction(s): hives, swelling     Codeine Other (See Comments)     hallucinations    Erythromycin Other (See Comments)     "odd thinking patterns, black dots in front of eyes"...."mycins"       History of Present Illness   [facility]    6/5/2025,07:09  PM  The history is provided by the old chart, spouse  and patient    Jannet Hayes is a 79 y.o. female presenting to the ED for lightheadedness and dizziness.  Past medical history: Bilateral carotid stenosis (US 2/6/2024 40-49% stenosis bilaterally), memory loss, irritable bowel, and transient global amnesia.      Main history is provided by her spouse: She was in her usual state of health until this afternoon.  At that time she had a wave of lightheadedness and dizziness that came up from her pelvis while she was at home talking on the phone.  He had taken her blood pressure and noted that it was higher than usual.  He notes that she normally has normal blood pressure but he is getting systolics in the 140s to 160s.  " Apparently she had felt dizzy at that time and was nauseous.  He reports that while they were in the waiting room after being roomed to the ED room, patient started having memory problems.  He noted that she was having difficulty forming words and speaking words.  The onset was at 6:30 a.m. this afternoon.  He notes that at the evaluation currently in the ED, her memory appears to be much better.  He was not aware of any facial droop, or weakness to 1 side of the body.  Last known well at 6:30 p.m..    Patient currently denies any chest pain, chest pressure, shortness of breath, difficulty breathing, fever, chills, numbness or weakness to 1 side, or vision changes.      Arrival mode: Private Vehicle     PCP: González Aburto MD     Past Medical History:  Past Medical History:   Diagnosis Date    Amnesia, global, transient 2019    Chronic constipation     Concussion 2020    History of IBS 2016    Skin cancer     Tremors of nervous system 2020    Vertigo 2020       Past Surgical History:  Past Surgical History:   Procedure Laterality Date    ADENOIDECTOMY      APPENDECTOMY      AUGMENTATION OF BREAST  2008    silicone    COSMETIC SURGERY      EYE SURGERY      LUMBAR LAMINECTOMY WITH SURGICAL REMOVAL OF LESION OF SPINAL CORD BY POSTERIOR APPROACH  1999         Family History:  Family History   Problem Relation Name Age of Onset    Diabetes Mother Roz     Stroke Mother Roz     Lymphoma Mother Roz     Arthritis Mother Roz     Cancer Mother Roz     Vision loss Mother Roz     Heart disease Father Darron        Social History:  Social History     Tobacco Use    Smoking status: Never    Smokeless tobacco: Never   Substance and Sexual Activity    Alcohol use: Yes     Alcohol/week: 1.0 standard drink of alcohol     Types: 1 Glasses of wine per week     Comment: occas    Drug use: Never    Sexual activity: Not Currently     Partners: Male     Birth control/protection: Post-menopausal       I have reviewed  the Past Medical History, Past Surgical History, Family History and Social History as documented above.      Review of Systems   Review of Systems   Constitutional:  Negative for fever.   HENT:  Negative for sore throat.    Eyes:  Negative for visual disturbance.   Respiratory:  Negative for shortness of breath.    Cardiovascular:  Negative for chest pain.   Gastrointestinal:  Positive for nausea. Negative for abdominal pain and vomiting.   Genitourinary:  Negative for dysuria.   Musculoskeletal:  Negative for back pain.   Skin:  Negative for rash.   Neurological:  Positive for dizziness and speech difficulty (Onset at 6:30 p.m..). Negative for facial asymmetry, weakness, light-headedness, numbness and headaches.   Hematological:  Does not bruise/bleed easily.      Physical Exam     Initial Vitals [06/05/25 1731]   BP Pulse Resp Temp SpO2   (!) 141/79 109 20 98 °F (36.7 °C) 98 %      MAP       --          Physical Exam    Nursing Notes and Vital Signs Reviewed.  Constitutional: Patient is in no apparent distress. Well-developed and well-nourished.  Head: Atraumatic. Normocephalic.  Eyes: PERRL. EOM intact. Conjunctivae are not pale. No scleral icterus.  ENT: Mucous membranes are moist. Oropharynx is clear and symmetric.    Neck: Supple. Full ROM. No lymphadenopathy.  No carotid bruits.  Cardiovascular: Regular rate. Regular rhythm. No murmurs, rubs, or gallops. Distal pulses are 2+ and symmetric.  Pulmonary/Chest: No respiratory distress. Clear to auscultation bilaterally. No wheezing or rales.  Abdominal: Soft and non-distended.  There is no tenderness.  No rebound, guarding, or rigidity. Good bowel sounds.  Genitourinary: n/a  Musculoskeletal: Moves all extremities. No obvious deformities. No edema. No calf tenderness.  Finger-to-nose intact.  Negative pass pointing.  Negative pronator drift.  Negative heel drop.  Sensation intact.  Smile symmetric.  Slight dysarthria noted.  Skin: Warm and dry.  Neurological:   Alert, awake, and appropriate.  Normal speech.  No acute focal neurological deficits are appreciated.  Psychiatric: Normal affect. Good eye contact. Appropriate in content.     ED Course   ED Procedures:  Procedures    ED Vital Signs:  Vitals:    06/05/25 1948 06/05/25 1949 06/05/25 1951 06/05/25 2015   BP: (!) 157/68 (!) 157/69 (!) 159/64 (!) 157/69   Pulse: 98 100 100 105   Resp: (!) 23 18 (!) 22 17   Temp:       TempSrc:       SpO2: 99% 98% 98% 99%   Weight:       Height:        06/05/25 2202 06/05/25 2215 06/05/25 2230 06/05/25 2245   BP: (!) 179/81 (!) 161/80 130/63 (!) 158/72   Pulse: 99 95 93 92   Resp:  15 15 19   Temp:       TempSrc:       SpO2: 99% 97% 97% 99%   Weight:       Height:        06/05/25 2248 06/05/25 2251 06/05/25 2307 06/05/25 2330   BP: (!) 144/63 (!) 156/67 (!) 146/66 134/68   Pulse: 92 102 91 89   Resp: 17 (!) 41 (!) 24 13   Temp:       TempSrc:       SpO2: 98% 100%  98%   Weight:       Height:        06/06/25 0000 06/06/25 0030 06/06/25 0100   BP: 137/63 133/65 (!) 116/59   Pulse: 86 87 86   Resp: 20 12 19   Temp:      TempSrc:      SpO2: 97% 99% 99%   Weight:      Height:          All Lab Results:  Results for orders placed or performed during the hospital encounter of 06/05/25   POCT glucose    Collection Time: 06/05/25  7:51 PM   Result Value Ref Range    POCT Glucose 99 70 - 110 mg/dL   BNP    Collection Time: 06/05/25  7:58 PM   Result Value Ref Range    BNP <10 0 - 99 pg/mL   CBC with Differential    Collection Time: 06/05/25  7:58 PM   Result Value Ref Range    WBC 13.87 (H) 3.90 - 12.70 K/uL    RBC 4.62 4.00 - 5.40 M/uL    HGB 14.1 12.0 - 16.0 gm/dL    HCT 42.4 37.0 - 48.5 %    MCV 92 82 - 98 fL    MCH 30.5 27.0 - 31.0 pg    MCHC 33.3 32.0 - 36.0 g/dL    RDW 13.8 11.5 - 14.5 %    Platelet Count 326 150 - 450 K/uL    MPV 9.7 9.2 - 12.9 fL    Nucleated RBC 0 <=0 /100 WBC    Neut % 77.9 (H) 38 - 73 %    Lymph % 13.7 (L) 18 - 48 %    Mono % 6.8 4 - 15 %    Eos % 0.5 <=8 %    Basophil  % 0.6 <=1.9 %    Imm Grans % 0.5 0.0 - 0.5 %    Neut # 10.79 (H) 1.8 - 7.7 K/uL    Lymph # 1.90 1 - 4.8 K/uL    Mono # 0.95 0.3 - 1 K/uL    Eos # 0.07 <=0.5 K/uL    Baso # 0.09 <=0.2 K/uL    Imm Grans # 0.07 (H) 0.00 - 0.04 K/uL   Comprehensive metabolic panel    Collection Time: 06/05/25  8:05 PM   Result Value Ref Range    Sodium 137 136 - 145 mmol/L    Potassium 4.1 3.5 - 5.1 mmol/L    Chloride 105 95 - 110 mmol/L    CO2 19 (L) 23 - 29 mmol/L    Glucose 107 70 - 110 mg/dL    BUN 18 8 - 23 mg/dL    Creatinine 0.9 0.5 - 1.4 mg/dL    Calcium 9.6 8.7 - 10.5 mg/dL    Protein Total 7.6 6.0 - 8.4 gm/dL    Albumin 3.8 3.5 - 5.2 g/dL    Bilirubin Total 0.3 0.1 - 1.0 mg/dL    ALP 48 40 - 150 unit/L    AST 20 11 - 45 unit/L    ALT 16 10 - 44 unit/L    Anion Gap 13 8 - 16 mmol/L    eGFR >60 >60 mL/min/1.73/m2   Troponin I #1    Collection Time: 06/05/25  8:05 PM   Result Value Ref Range    Troponin-I <0.006 <=0.026 ng/mL   Gold Top Hold    Collection Time: 06/05/25  8:06 PM   Result Value Ref Range    Extra Tube Hold for add-ons.    Light Green Top Hold    Collection Time: 06/05/25  8:07 PM   Result Value Ref Range    Extra Tube Hold for add-ons.    Gold Top Hold    Collection Time: 06/05/25  8:07 PM   Result Value Ref Range    Extra Tube Hold for add-ons.    TSH    Collection Time: 06/05/25  8:07 PM   Result Value Ref Range    TSH 0.228 (L) 0.400 - 4.000 uIU/mL   Light Blue Top Hold    Collection Time: 06/05/25  8:19 PM   Result Value Ref Range    Extra Tube Hold for add-ons.    Protime-INR    Collection Time: 06/05/25  8:19 PM   Result Value Ref Range    PT 10.3 9.0 - 12.5 seconds    INR 0.9 0.8 - 1.2   LDL - Lipid Panel    Collection Time: 06/05/25  8:52 PM   Result Value Ref Range    Cholesterol Total 221 (H) 120 - 199 mg/dL    Triglyceride 89 30 - 150 mg/dL    HDL Cholesterol 73 40 - 75 mg/dL    LDL Cholesterol 130.2 63.0 - 159.0 mg/dL    HDL/Cholesterol Ratio 33.0 20.0 - 50.0 %    Cholesterol/HDL Ratio 3.0 2.0 - 5.0     Non HDL Cholesterol 148 mg/dL   T4    Collection Time: 06/05/25  8:52 PM   Result Value Ref Range    T4, Thyroxine 7.4 4.5 - 11.5 ug/dL   Troponin I #2    Collection Time: 06/05/25 11:37 PM   Result Value Ref Range    Troponin-I <0.006 <=0.026 ng/mL         The EKG was ordered, reviewed, and independently interpreted by the ED provider:  Sinus tachycardia.  Rate 104.  Normal axis.  No ST segment elevation.  No STEMI.        Imaging Results:  Imaging Results              MRI Brain Without Contrast (Final result)  Result time 06/05/25 21:27:26      Final result by Gulshan Burch DO (06/05/25 21:27:26)                   Impression:      No acute intracranial abnormality.    Finalized on: 6/5/2025 9:27 PM By:  Gulshan Burch  [facility]# [id number]      2025-06-05 21:29:35.683     [facility]               Narrative:    EXAM: MRI BRAIN WITHOUT CONTRAST    CLINICAL HISTORY: Stroke, follow up;    TECHNIQUE: Multiplanar, multisequence MR images of the brain obtained without contrast.    COMPARISON: CTA head and neck same date    FINDINGS:  T1-weighted images demonstrate a normal anatomical appearance to the cerebrum, cerebellum and brainstem.    No areas of restricted diffusion to suggest acute ischemia.  Expected vascular flow voids are present.    No intra or extra-axial hemorrhage.  No shift of the midline structures. Multifocal areas of T2 hyperintensity within the corona radiata and centrum semi-ovale consistent with small vessel ischemic disease.    Visualized paranasal sinuses and mastoid air cells are clear.                                         X-Ray Chest AP Portable (Final result)  Result time 06/05/25 20:30:37      Final result by Chung Lind MD (06/05/25 20:30:37)                   Impression:        Negative portable chest radiograph.      Finalized on: 6/5/2025 8:30 PM By:  ADITYA Lind MD, MD  [facility]# [id number]      2025-06-05 20:32:42.595     [facility]               Narrative:    EXAM:   XR CHEST AP  PORTABLE    CLINICAL HISTORY: Chest pain    COMPARISON: None.    FINDINGS:  The lungs are clear.   No pneumothorax.  The cardiac silhouette size and contour is within normal limits.                                             CTA Head and Neck (xpd) (Final result)  Result time 06/05/25 20:24:17      Final result by Gulshan Burch DO (06/05/25 20:24:17)                   Impression:      1. No acute intracranial abnormality.  2. No large vessel occlusion.  3.  Results were communicated to the Emergency Room physician on 6/5/2025 8:20 PM by MAI [email or web address].    % stenosis derived by comparing the narrowest segment with the distal luminal diameter as related to the reported measure of arterial narrowing.      All CT scans at [this location] are performed using dose modulation techniques as appropriate to a performed exam including the following: automated exposure control; adjustment of the mA and/or kV according to patient size (this includes techniques or standardized protocols for targeted exams where dose is matched to indication / reason for exam; i.e. extremities or head); use of iterative reconstruction technique.        Finalized on: 6/5/2025 8:24 PM By:  Gulshan Burch  [facility]# [id number]      2025-06-05 20:26:22.312     [facility]               Narrative:    Exam: CTA HEAD AND NECK (XPD)    Comparison:  None    Clinical Indication:  Neuro deficit    TECHNIQUE: Axial noncontrast images are acquired from the foramen magnum through the vertex.  After the uneventful administration of IV contrast, CT angiographic images are acquired from the aortic arch through the vertex. Sagittal, coronal and MIP images were also provided.    FINDINGS:    Noncontrast head CT: No intra-extra-axial hemorrhage.  No shift of the midline structures.  Gray-white differentiation is preserved throughout both cerebral hemispheres.  No hyperdense vessels.    Paranasal sinuses and mastoid air cells are clear.    CTA head and  neck:    Anatomy:  Neck and cervical vessels demonstrate normal vascular anatomy.    Right carotid:  No aneurysms, dissections, thromboses or hemodynamically significant stenoses.    Left carotid:  No aneurysm, dissection, thromboses or hemodynamically significant stenosis.    Right and left vertebral arteries:Patent, without aneurysm or dissection.    Posterior circulation:  PICA:Patent without significant stenosis.  Distal vertebral arteries:Patent without significant stenosis  Basilar artery:Patent without significant stenosis or aneurysm.  Posterior cerebral arteries:Patent without significant stenosis.  Posterior communicating arteries:Not clearly visualized.    Anterior circulation:     Internal carotid arteries:  Patent without significant stenosis.     Middle cerebral arteries:  Patent without significant stenosis.     A1 segments:  Patent without significant stenosis.  Hypoplastic A1 segment on the right.  Right A2 segment is originating from the left A1 segment.    Anterior cerebral arteries:  Patent without significant stenosis.    Anterior communicating artery:  Not visualized      Major dural venous sinuses are patent.    NECK:  Visualized soft tissues of the neck are unremarkable. No lymphadenopathy.    Lungs/mediastinum:  Unremarkable.    Osseous structures:  No concerning lytic or sclerotic bony lesions.                                              The Emergency Provider reviewed the vital signs and test results, which are outlined above.     ED Discussion   ED Medication(s):  Medications   iohexoL (OMNIPAQUE 350) injection 100 mL (100 mLs Intravenous Given 6/5/25 1955)   acetaminophen tablet 650 mg (650 mg Oral Given 6/5/25 2206)   meclizine tablet 25 mg (25 mg Oral Given 6/5/25 2335)       ED Course as of 06/06/25 0611   Thu Jun 05, 2025 2248 MRI Brain Without Contrast  Impression:     No acute intracranial abnormality   [LB]      ED Course User Index  [LB] Sayra Quintanilla DO       8:00 PM:  Dr. Quintanilla transfers care of patient to Dr. Norwood pending lab and imaging results.     12:39 AM: Reassessed pt at this time. Discussed with patient and/or family/caretaker all pertinent ED information and results. Discussed pt dx and plan of tx. Gave the patient all f/u and return to the ED instructions. All questions and concerns were addressed at this time. Patient and/or family/caretaker expresses understanding of information and instructions, and is comfortable with plan to discharge. Pt is stable for discharge.     I discussed with patient and/or family/caretaker that evaluation in the ED does not suggest any emergent or life threatening medical conditions requiring immediate intervention beyond what was provided in the ED, and I believe patient is safe for discharge.  Regardless, an unremarkable evaluation in the ED does not preclude the development or presence of a serious of life threatening condition. As such, I instructed that the patient is to return immediately for any worsening or change in current symptoms.      MIPS Measures     Smoker? No     Hypertension: Blood pressure was > 120/80.  Patient was informed that they may be developing hypertension.  They were advised to keep a log of their blood pressure and follow up with their primary care physician.       Medical Decision Making           Additional MDM:     NIH Stroke Scale:   Interval = baseline (upon arrival/admit)  Level of consciousness = 0 - alert  LOC questions = 0 - answers both correctly  LOC commands = 0 - performs both correctly  Best gaze = 0 - normal  Visual = 0 - no visual loss  Facial palsy = 0 - normal  Motor left arm =  0 - no drift  Motor right arm =  0 - no drift  Motor left leg = 0 - no drift  Motor right leg =  0 - no drift  Limb ataxia = 0 - absent  Sensory = 0 - normal  Best language = 0 - no aphasia  Dysarthria = 1 - mild to moderate dysarthria  Extinction and inattention = 0 - no neglect  NIH Stroke Scale Total =  1           Medical Decision Making  Amount and/or Complexity of Data Reviewed  Labs: ordered.  Radiology: ordered. Decision-making details documented in ED Course.    Risk  OTC drugs.  Prescription drug management.        Prescription Management: I performed a review of the patient's current Rx medication list as input by nursing staff.    Discharge Medication List as of 6/6/2025 12:38 AM        START taking these medications    Details   meclizine (ANTIVERT) 25 mg tablet Take 1 tablet (25 mg total) by mouth 3 (three) times daily as needed for Dizziness or Nausea., Starting Fri 6/6/2025, Print           CONTINUE these medications which have NOT CHANGED    Details   acetaminophen (TYLENOL) 500 MG tablet Take 500 mg by mouth as needed., Historical Med      acetylcysteine (NAC ORAL) Take 1,000 mg by mouth Daily., Historical Med      antiarthritic combination no.2 900 mg Tab Take 2 tablets by mouth once., Historical Med      ashwagandha root extract 300 mg Cap Take by mouth., Starting Fri 5/27/2022, Historical Med      aspirin (ECOTRIN) 81 MG EC tablet Take 1 tablet (81 mg total) by mouth once daily., Starting Thu 3/20/2025, Until Fri 3/20/2026, Normal      astaxanthin 12 mg Cap Take 12 mg by mouth Daily., Historical Med      bimatoprost (LATISSE) 0.03 % ophthalmic solution Place one drop on applicator and apply evenly along the skin of the upper eyelid at base of eyelashes once daily at bedtime; repeat procedure for second eye (use a clean applicator)., Normal      BLUEBERRY FLAVOR MISC by Misc.(Non-Drug; Combo Route) route., Historical Med      cholecalciferol, vitamin D3, (VITAMIN D3) 25 mcg (1,000 unit) capsule Take by mouth., Starting Fri 5/27/2022, Historical Med      co-enzyme Q-10 50 mg capsule Take 100 mg by mouth once daily., Historical Med      COCONUT OIL ORAL Take by mouth., Historical Med      DHA-PHOSPHATIDYLSERINE ORAL Take 300 capsules by mouth., Starting Fri 5/27/2022, Historical Med      estradiol  0.05 mg/24 hr td ptsw (VIVELLE-DOT) 0.05 mg/24 hr Place one patch twice weekly, Normal      GARLIC ORAL Take by mouth., Historical Med      GLYCINE ORAL Take 1,000 mg by mouth Daily., Historical Med      ivabradine (CORLANOR) 7.5 mg Tab Take 2 tablets (15 mg total) by mouth On call Procedure (take 2-hours prior to your cardiac CTA)., Starting Tue 4/8/2025, Normal      magnesium hydroxide 1,200 mg Chew Take by mouth., Starting Fri 5/27/2022, Historical Med      magnesium hydroxide 400 mg/5 ml (MILK OF MAGNESIA) 400 mg/5 mL Susp Take 30 mLs by mouth once daily., Historical Med      metoprolol tartrate (LOPRESSOR) 50 MG tablet Take 1 tablet (50 mg total) by mouth as needed (for heart rate over 60)., Starting Thu 3/20/2025, Until Fri 3/20/2026 at 2359, Normal      multivit with minerals/lutein (MULTIVITAMIN 50 PLUS ORAL) Take 1 tablet by mouth., Starting Fri 5/27/2022, Historical Med      multivit-min/folic ac/collagen (WOMEN'S MULTIVITAMIN COLLAGEN ORAL) Take by mouth., Historical Med      nitroGLYCERIN (NITROSTAT) 0.4 MG SL tablet Place 1 tablet (0.4 mg total) under the tongue every 5 (five) minutes as needed for Chest pain. After 2nd dose recommend ER eval - call EMS, Starting Thu 3/20/2025, Until Fri 3/20/2026 at 2359, Normal      olive oil external oil Apply topically as needed., Historical Med      omega 3-dha-epa-fish oil 100-150-750 mg Cap Take 1 capsule by mouth., Starting Fri 5/27/2022, Historical Med      PASSION FLOWER ORAL Take by mouth., Historical Med      PHOSPHATIDYL SERINE, BULK, MISC 300 mg by Misc.(Non-Drug; Combo Route) route 2 (two) times a day., Historical Med      pravastatin (PRAVACHOL) 20 MG tablet Take 1 tablet (20 mg total) by mouth every evening., Starting Thu 3/20/2025, Until Fri 3/20/2026, Normal      progesterone (PROMETRIUM) 100 MG capsule Take 200 mg by mouth nightly., Starting Fri 5/27/2022, Historical Med      propranoloL (INDERAL LA) 60 MG 24 hr capsule Take 1 capsule (60 mg total)  "by mouth every evening., Starting Thu 3/20/2025, Until Fri 3/20/2026, Normal      soybean, fermented (NATTOKINASE ORAL) Take 200 mg by mouth 2 (two) times a day., Historical Med      taurine 1,000 mg Cap Take 1,000 mg by mouth 2 (two) times a day., Historical Med      turmeric 400 mg Cap Take by mouth., Starting Fri 5/27/2022, Historical Med      !! UNABLE TO FIND Take 1,000 mg by mouth Daily. Lion's pooja, Historical Med      !! UNABLE TO FIND Take 1,250 mg by mouth Daily. Black Seed Oil, Historical Med      !! UNABLE TO FIND Sulforaphane, Historical Med      !! UNABLE TO FIND Advanced Memory Formula, Historical Med      !! UNABLE TO FIND Take 200 mg by mouth 2 (two) times a day. L-theanine, Historical Med      vit B complex 100 no.2/herbs (VITAMIN B COMPLEX 100  2-HERBS ORAL) Take 1 tablet by mouth., Starting Fri 5/27/2022, Historical Med      vitamin D (VITAMIN D3) 1000 units Tab Take by mouth., Historical Med      VITAMIN K2 ORAL Take by mouth., Historical Med       !! - Potential duplicate medications found. Please discuss with provider.           Referrals:  No orders of the defined types were placed in this encounter.    Portions of this note may have been created with voice recognition software. Occasional "wrong-word" or "sound-a-like" substitutions may have occurred due to the inherent limitations of voice recognition software. Please, read the note carefully and recognize, using context, where substitutions have occurred.          Clinical Impression       ICD-10-CM ICD-9-CM   1. Dizziness  R42 780.4   2. Chest pain  R07.9 786.50   3. Acute focal neurological deficit  R29.818 781.99   4. Elevated blood pressure reading without diagnosis of hypertension  R03.0 796.2         ED Disposition  Disposition:   Disposition: Discharged  Condition: Stable    Discharge to home  Patient condition: Stable    ED Follow-up   Follow-up Information       González Aburto MD In 3 days.    Specialty: Internal " Medicine  Contact information:  [address] [facility] [id number] [phone]               [facility]    Specialty: Emergency Medicine  Why: As needed, If symptoms worsen  Contact information:  [address] [facility] [address] [phone]                             Scribe Attestation:   Scribe #1: I, Leni Robb, performed the above scribed service and the documentation accurately describes the services I performed. I attest to the accuracy of the note.     Attending:   Physician Attestation Statement for Scribe #1: I, Sayra Quintanilla, , FACEP, personally performed the services described in this documentation, as scribed by Leni Robb, in my presence, and it is both accurate and complete.       Scribe Attestation:   Scribe #2: I performed the above scribed service and the documentation accurately describes the services I performed. I attest to the accuracy of the note.    Attending Attestation:           Physician Attestation for Scribe:    Physician Attestation Statement for Scribe #2: I, Mir Norwood MD, reviewed documentation, as scribed by Walter Juarez in my presence, and it is both accurate and complete. I also acknowledge and confirm the content of the note done by Hal #1.                   Mir Norwood MD  06/06/25 0655

## 2025-06-06 NOTE — TELEMEDICINE CONSULT
[facility]  Vascular Neurology  Comprehensive Stroke Center  TeleVascular Neurology Acute Consultation Note        Consult Information  Consult to Telemedicine - Acute Stroke  Consult performed by: Josiah Shoemaker MD  Consult ordered by: Sayra Cade DO          Consulting Provider: SAYRA CADE   Current Providers  No providers found    Patient Location:  [facility] EMERGENCY DEPARTMENT Emergency Department    Spoke hospital nurse at bedside with patient assisting consultant.  Patient information was obtained from patient, spouse/SO, past medical records, and primary team.       Stroke Documentation  Acute Stroke Times   Last Known Normal Date: 06/05/25  Last Known Normal Time: 1830  Stroke Team Called Date: 06/05/25  Stroke Team Called Time: 1951  Stroke Team Arrival Date: 06/05/25  Stroke Team Arrival Time: 2013  CT Interpretation Time: 2003  Thrombolytic Therapy Recommended: No  CTA Interpretation Time: 2020  Thrombectomy Recommended: No    NIH Scale:  1a. Level of Consciousness: 0-->Alert, keenly responsive  1b. LOC Questions: 0-->Answers both questions correctly  1c. LOC Commands: 0-->Performs both tasks correctly  2. Best Gaze: 0-->Normal  3. Visual: 0-->No visual loss  4. Facial Palsy: 0-->Normal symmetrical movements  5a. Motor Arm, Left: 0-->No drift, limb holds 90 (or 45) degrees for full 10 secs  5b. Motor Arm, Right: 0-->No drift, limb holds 90 (or 45) degrees for full 10 secs  6a. Motor Leg, Left: 0-->No drift, leg holds 30 degree position for full 5 secs  6b. Motor Leg, Right: 0-->No drift, leg holds 30 degree position for full 5 secs  7. Limb Ataxia: 0-->Absent  8. Sensory: 0-->Normal, no sensory loss  9. Best Language: 0-->No aphasia, normal  10. Dysarthria: 0-->Normal  11. Extinction and Inattention (formerly Neglect): 0-->No abnormality  Total (NIH Stroke Scale): 0      Modified [place] Baseline:    Modified [place] Discharge:    [place] Coma Scale:     ABCD2  "Score:    [id number] Score:    HAS -BLED Score:    ICH Score:    Hunt & Mosley Classification:      Blood pressure (!) 157/69, pulse 105, temperature 98.7 °F (37.1 °C), temperature source Oral, resp. rate 17, height 5' 6" (1.676 m), weight 59.8 kg (131 lb 13.4 oz), SpO2 99%.      In my opinion, this was a: Tier 1; VAN Stroke Assessment: Negative     Medical Decision Making  HPI:  79 y.o. female with a history of IBS, who  initially presented to the er with elevated BP,  nausea and was reportedly feeling sick to her stomach.    While in the ER, she was noted to have acute onset memory loss.  Per the , she had a blank look on her face.   Stroke code was called for the same..    Similar episode in 2019.  MRI showed no acute infarct.  Diagnosed with transient global amnesia.  Per the , she was under a lot of stress at the time      /79       Images personally reviewed and interpreted:  Study: Head CT and CTA Head & Neck  Study Interpretation:   nonspecific hypodensity in the left anterior lentiform nucleus/CR along with a suspected perivascular space in the left basal ganglia - Both present on prior CT from September 2023     Assessment and plan:  78 y/o  female with acute onset memory loss.    Exam ->  Alert and Ox3.  Follows commands.   No dysarthria or facial weakness.  No drift in the upper or lower extremities.   Finger-nose intact.      Memory loss is not a typical symptom of an acute stroke.   Presentation possibly related to TGA/stress   Reasonable to obtain MRI of the brain to rule out any acute ischemia.   If acute ischemia noted, Neurology consult for further recs      Lytics recommendation: Thrombolytic therapy not recommended due to Suspected stroke mimic , Patient back to neurological baseline, and Mild Non-Disabling Symptoms    Thrombectomy recommendation: No; No large vessel occlusion identified on imaging   Placement recommendation: pending further studies               ROS  Physical " Exam  Past Medical History:   Diagnosis Date    Amnesia, global, transient 2019    Chronic constipation     Concussion 2020    History of IBS 2016    Skin cancer     Tremors of nervous system 2020    Vertigo 2020     Past Surgical History:   Procedure Laterality Date    ADENOIDECTOMY      APPENDECTOMY      AUGMENTATION OF BREAST  2008    silicone    COSMETIC SURGERY      EYE SURGERY      LUMBAR LAMINECTOMY WITH SURGICAL REMOVAL OF LESION OF SPINAL CORD BY POSTERIOR APPROACH  1999     Family History   Problem Relation Name Age of Onset    Diabetes Mother Roz     Stroke Mother Roz     Lymphoma Mother Roz     Arthritis Mother Roz     Cancer Mother Roz     Vision loss Mother Roz     Heart disease Father Darron        Diagnoses  Confusion/Encephalopathy    Josiah Shoemaker MD      Emergent/Acute neurological consultation requested by spoke provider due to critical concerns for possible cerebrovascular event that could result in permanent loss of neurologic/bodily function, severe disability or death of this patient.  Immediate/timely evaluation by a highly prepared expert is paramount for optimal outcomes  High risk for neurological deterioration if not properly diagnosed  High risk for neurological deterioration if not treated promplty/as soon as possible  Complex diagnostic evaluation may be required (advanced imaging)  High risk treatment options (thrombolytics and/or thrombectomy)    Patient care was coordinated with spoke provider, including but not limted to    Discussing likely diagnosis/etiology of symptoms  Making recommendations for further diagnostic studies  Making recommendations for intravenous thrombolytics or other advanced therapies  Making recommendations for disposition (admission/transfer for higher level of care)      Neurology consultation requested by spoke provider. Audiovisual encounter with the patient performed using a secure connection.  Results and impressions from the  visit are documented on this note and were communicated to the consulting provider/team via direct communication. The note has been shared for addition to the patients electronic medical record.

## 2025-06-06 NOTE — ED PROVIDER NOTES
"Emergency Medicine Provider Note - 6/5/2025    SCRIBE NOTE: I, Leni Robb, am scribing for, and in the presence of Sayra Quintanilla DO, FACEP    SCRIBE #2 NOTE: I, Walter Juarez, am scribing for, and in the presence of,  Mir Norwood MD. I have scribed the remaining portions of the note not scribed by Scribe #1.      History     Chief Complaint   Patient presents with    Dizziness     Pt reports a wave of lightheadedness and dizziness that occurred while she was lying down at home and talking on the phone.  She took her BP shortly thereafter, and it was higher than usual.  Pt reports her dizziness has partially resolved at this time, and she continues to feel nauseated.       Allergies:  Review of patient's allergies indicates:   Allergen Reactions    Oxycodone-acetaminophen Anaphylaxis     HALLUCINATIONS    Penicillins Hives     ITCHING, SWELLING.  Patient states "can take Keflex without any problems".  Other reaction(s): hives, swelling     Codeine Other (See Comments)     hallucinations    Erythromycin Other (See Comments)     "odd thinking patterns, black dots in front of eyes"...."mycins"       History of Present Illness   HPI    6/5/2025, 7:09 PM  The history is provided by the  and patient    Main Hx provided by :  Jannet Hayes is a 79 y.o. female patient with a PMHx of transient global amnesia, tremors, IBS, and melanoma who presents to the Emergency Department for the initial Chief Complaint of hypertensive BP. Now,  is highly concerned about the pt's mental status change that started at 6:30pm today and is worried the pt is having another episode of transient global amnesia. With regard to pt's BP,  reports the pt felt dizzy/light-headed while ambulating. He checked her BP shortly after and noticed it was higher than usual. He notes pt has had several instances of elevated BP in the past and that her BP is normally hypotensive. He notes pt has had a Holter monitor as well " as a stress test, and both indicated unremarkable results. Associated sxs include confusion, memory difficulty, tremors, diarrhea, and emotional stress.  denies slurred speech.    Additional Hx from pt regarding associated sxs:  Pt reports having a headache earlier, but it has spontaneously resolved now. Pt denies dysuria, numbness, CP, and SOB.     Arrival mode: Private Vehicle     PCP: González Aburto MD     Past Medical History:  Past Medical History:   Diagnosis Date    Amnesia, global, transient 2019    Chronic constipation     Concussion 2020    History of IBS 2016    Skin cancer     Tremors of nervous system 2020    Vertigo 2020       Past Surgical History:  Past Surgical History:   Procedure Laterality Date    ADENOIDECTOMY      APPENDECTOMY      AUGMENTATION OF BREAST  2008    silicone    COSMETIC SURGERY      EYE SURGERY      LUMBAR LAMINECTOMY WITH SURGICAL REMOVAL OF LESION OF SPINAL CORD BY POSTERIOR APPROACH  1999         Family History:  Family History   Problem Relation Name Age of Onset    Diabetes Mother Roz     Stroke Mother Roz     Lymphoma Mother Roz     Arthritis Mother Roz     Cancer Mother Roz     Vision loss Mother Roz     Heart disease Father Darron        Social History:  Social History     Tobacco Use    Smoking status: Never    Smokeless tobacco: Never   Substance and Sexual Activity    Alcohol use: Yes     Alcohol/week: 1.0 standard drink of alcohol     Types: 1 Glasses of wine per week     Comment: occas    Drug use: Never    Sexual activity: Not Currently     Partners: Male     Birth control/protection: Post-menopausal       I have reviewed the Past Medical History, Past Surgical History, Family History and Social History as documented above.      Review of Systems   Review of Systems   Respiratory:  Negative for shortness of breath.    Cardiovascular:  Negative for chest pain.   Gastrointestinal:  Positive for diarrhea.   Genitourinary:  Negative for  "dysuria.   Neurological:  Positive for tremors. Negative for numbness.   Psychiatric/Behavioral:  Positive for confusion.         (+) memory difficulty  (+) emotional stress       ***   Physical Exam     Initial Vitals [06/05/25 1731]   BP Pulse Resp Temp SpO2   (!) 141/79 109 20 98 °F (36.7 °C) 98 %      MAP       --          Physical Exam    Nursing Notes and Vital Signs Reviewed.  Constitutional: Patient is in {DISTRESS LEVEL:46928}. Well-developed and well-nourished.  Head: Atraumatic. Normocephalic.  Eyes: PERRL. EOM intact. Conjunctivae are not pale. No scleral icterus.  ENT: Mucous membranes are moist. Oropharynx is clear and symmetric***.    Neck: Supple. Full ROM. No lymphadenopathy.  Cardiovascular: Regular rate***. Regular rhythm***. No murmurs, rubs, or gallops. Distal pulses are 2+ and symmetric***.  Pulmonary/Chest: No r.  espiratory distress. Clear to auscultation bilaterally***. No wheezing or rales.  Abdominal: Soft and non-distended.  There is no tenderness.  No rebound, guarding, or rigidity. Good bowel sounds***.  Genitourinary: {Choices of Genitourinary (Optional):58155::"N/A"} ***  Musculoskeletal: Moves all extremities. No obvious deformities. No edema. No calf tenderness***.  Skin: Warm and dry.  Neurological:  Alert, awake, and appropriate.  Slurred speech.  No acute focal neurological deficits are appreciated.  Psychiatric: Normal affect. Good eye contact. Appropriate in content.     ED Course   ED Procedures:  Procedures    ED Vital Signs:  Vitals:    06/05/25 1731 06/05/25 1749 06/05/25 1845 06/05/25 1847   BP: (!) 141/79 (!) 141/75     Pulse: 109 109 100 99   Resp: 20 18     Temp: 98 °F (36.7 °C) 98.7 °F (37.1 °C)     TempSrc: Oral Oral     SpO2: 98% 99%     Weight:  63.4 kg (139 lb 12.8 oz)     Height:        06/05/25 1852 06/05/25 1948 06/05/25 1949 06/05/25 1951   BP: (!) 168/72 (!) 157/68 (!) 157/69 (!) 159/64   Pulse: 100 98 100 100   Resp: 16 (!) 23 18 (!) 22   Temp: 98.7 °F (37.1 " "°C)      TempSrc: Oral      SpO2: 98% 99% 98% 98%   Weight: 59.8 kg (131 lb 13.4 oz)      Height: 5' 6" (1.676 m)       06/05/25 2015   BP: (!) 157/69   Pulse: 105   Resp: 17   Temp:    TempSrc:    SpO2: 99%   Weight:    Height:        All Lab Results:  Results for orders placed or performed during the hospital encounter of 06/05/25   POCT glucose    Collection Time: 06/05/25  7:51 PM   Result Value Ref Range    POCT Glucose 99 70 - 110 mg/dL   BNP    Collection Time: 06/05/25  7:58 PM   Result Value Ref Range    BNP <10 0 - 99 pg/mL   CBC with Differential    Collection Time: 06/05/25  7:58 PM   Result Value Ref Range    WBC 13.87 (H) 3.90 - 12.70 K/uL    RBC 4.62 4.00 - 5.40 M/uL    HGB 14.1 12.0 - 16.0 gm/dL    HCT 42.4 37.0 - 48.5 %    MCV 92 82 - 98 fL    MCH 30.5 27.0 - 31.0 pg    MCHC 33.3 32.0 - 36.0 g/dL    RDW 13.8 11.5 - 14.5 %    Platelet Count 326 150 - 450 K/uL    MPV 9.7 9.2 - 12.9 fL    Nucleated RBC 0 <=0 /100 WBC    Neut % 77.9 (H) 38 - 73 %    Lymph % 13.7 (L) 18 - 48 %    Mono % 6.8 4 - 15 %    Eos % 0.5 <=8 %    Basophil % 0.6 <=1.9 %    Imm Grans % 0.5 0.0 - 0.5 %    Neut # 10.79 (H) 1.8 - 7.7 K/uL    Lymph # 1.90 1 - 4.8 K/uL    Mono # 0.95 0.3 - 1 K/uL    Eos # 0.07 <=0.5 K/uL    Baso # 0.09 <=0.2 K/uL    Imm Grans # 0.07 (H) 0.00 - 0.04 K/uL   Comprehensive metabolic panel    Collection Time: 06/05/25  8:05 PM   Result Value Ref Range    Sodium 137 136 - 145 mmol/L    Potassium 4.1 3.5 - 5.1 mmol/L    Chloride 105 95 - 110 mmol/L    CO2 19 (L) 23 - 29 mmol/L    Glucose 107 70 - 110 mg/dL    BUN 18 8 - 23 mg/dL    Creatinine 0.9 0.5 - 1.4 mg/dL    Calcium 9.6 8.7 - 10.5 mg/dL    Protein Total 7.6 6.0 - 8.4 gm/dL    Albumin 3.8 3.5 - 5.2 g/dL    Bilirubin Total 0.3 0.1 - 1.0 mg/dL    ALP 48 40 - 150 unit/L    AST 20 11 - 45 unit/L    ALT 16 10 - 44 unit/L    Anion Gap 13 8 - 16 mmol/L    eGFR >60 >60 mL/min/1.73/m2   Troponin I #1    Collection Time: 06/05/25  8:05 PM   Result Value Ref Range "    Troponin-I <0.006 <=0.026 ng/mL       Reviewed Prior Labs:  N/A      The EKG was ordered, reviewed, and independently interpreted by the ED provider.  Interpretation time: ***  Rate: *** BPM  Rhythm: {rhythm:67424}  Interpretation: ***. No STEMI.  When compared to EKG performed on ***, there are no significant changes.      Imaging Results:  Imaging Results              X-Ray Chest AP Portable (Final result)  Result time 06/05/25 20:30:37      Final result by Chung Lind MD (06/05/25 20:30:37)                   Impression:        Negative portable chest radiograph.      Finalized on: 6/5/2025 8:30 PM By:  ADITYA Lind MD, MD  [facility]# [id number]      2025-06-05 20:32:42.595     [facility]               Narrative:    EXAM:   XR CHEST AP PORTABLE    CLINICAL HISTORY: Chest pain    COMPARISON: None.    FINDINGS:  The lungs are clear.   No pneumothorax.  The cardiac silhouette size and contour is within normal limits.                                             CTA Head and Neck (xpd) (Final result)  Result time 06/05/25 20:24:17      Final result by Gulshan Burch DO (06/05/25 20:24:17)                   Impression:      1. No acute intracranial abnormality.  2. No large vessel occlusion.  3.  Results were communicated to the Emergency Room physician on 6/5/2025 8:20 PM by MAI Mcgraw,D.O.    % stenosis derived by comparing the narrowest segment with the distal luminal diameter as related to the reported measure of arterial narrowing.      All CT scans at [this location] are performed using dose modulation techniques as appropriate to a performed exam including the following: automated exposure control; adjustment of the mA and/or kV according to patient size (this includes techniques or standardized protocols for targeted exams where dose is matched to indication / reason for exam; i.e. extremities or head); use of iterative reconstruction technique.        Finalized on: 6/5/2025 8:24 PM By:  Gulshan  Kiersten  [facility]# [id number]      2025-06-05 20:26:22.312     [facility]               Narrative:    Exam: CTA HEAD AND NECK (XPD)    Comparison:  None    Clinical Indication:  Neuro deficit    TECHNIQUE: Axial noncontrast images are acquired from the foramen magnum through the vertex.  After the uneventful administration of IV contrast, CT angiographic images are acquired from the aortic arch through the vertex. Sagittal, coronal and MIP images were also provided.    FINDINGS:    Noncontrast head CT: No intra-extra-axial hemorrhage.  No shift of the midline structures.  Gray-white differentiation is preserved throughout both cerebral hemispheres.  No hyperdense vessels.    Paranasal sinuses and mastoid air cells are clear.    CTA head and neck:    Anatomy:  Neck and cervical vessels demonstrate normal vascular anatomy.    Right carotid:  No aneurysms, dissections, thromboses or hemodynamically significant stenoses.    Left carotid:  No aneurysm, dissection, thromboses or hemodynamically significant stenosis.    Right and left vertebral arteries:Patent, without aneurysm or dissection.    Posterior circulation:  PICA:Patent without significant stenosis.  Distal vertebral arteries:Patent without significant stenosis  Basilar artery:Patent without significant stenosis or aneurysm.  Posterior cerebral arteries:Patent without significant stenosis.  Posterior communicating arteries:Not clearly visualized.    Anterior circulation:     Internal carotid arteries:  Patent without significant stenosis.     Middle cerebral arteries:  Patent without significant stenosis.     A1 segments:  Patent without significant stenosis.  Hypoplastic A1 segment on the right.  Right A2 segment is originating from the left A1 segment.    Anterior cerebral arteries:  Patent without significant stenosis.    Anterior communicating artery:  Not visualized      Major dural venous sinuses are patent.    NECK:  Visualized soft tissues of the neck are  unremarkable. No lymphadenopathy.    Lungs/mediastinum:  Unremarkable.    Osseous structures:  No concerning lytic or sclerotic bony lesions.                                              The Emergency Provider reviewed the vital signs and test results, which are outlined above.     ED Discussion   ED Medication(s):  Medications   iohexoL (OMNIPAQUE 350) injection 100 mL (100 mLs Intravenous Given 6/5/25 1955)            7:47 PM: Code Stroke start. See nursing notes in care timeline.    ***:*** PM/AM: Code Stroke end. See nursing notes in care timeline.    {Dwight Plan Options:36168}    {PLAN:80053}      MIPS Measures     Smoker? No     Hypertension: Blood pressure was > 120/80.  Patient was informed that they may be developing hypertension.  They were advised to keep a log of their blood pressure and follow up with their primary care physician.     Medical Decision Making                 Medical Decision Making  Amount and/or Complexity of Data Reviewed  Independent Historian: spouse     Details:  provided significant Hx.  Labs: ordered. Decision-making details documented in ED Course.  Radiology: ordered. Decision-making details documented in ED Course.    Risk  Prescription drug management.  Decision regarding hospitalization.        Prescription Management: I performed a review of the patient's current Rx medication list as input by nursing staff.    Patient's Medications   New Prescriptions    No medications on file   Previous Medications    ACETAMINOPHEN (TYLENOL) 500 MG TABLET    Take 500 mg by mouth as needed.    ACETYLCYSTEINE (NAC ORAL)    Take 1,000 mg by mouth Daily.    ANTIARTHRITIC COMBINATION NO.2 900 MG TAB    Take 2 tablets by mouth once.    ASHWAGANDHA ROOT EXTRACT 300 MG CAP    Take by mouth.    ASPIRIN (ECOTRIN) 81 MG EC TABLET    Take 1 tablet (81 mg total) by mouth once daily.    ASTAXANTHIN 12 MG CAP    Take 12 mg by mouth Daily.    BIMATOPROST (LATISSE) 0.03 % OPHTHALMIC SOLUTION     Place one drop on applicator and apply evenly along the skin of the upper eyelid at base of eyelashes once daily at bedtime; repeat procedure for second eye (use a clean applicator).    BLUEBERRY FLAVOR MISC    by Misc.(Non-Drug; Combo Route) route.    CHOLECALCIFEROL, VITAMIN D3, (VITAMIN D3) 25 MCG (1,000 UNIT) CAPSULE    Take by mouth.    CO-ENZYME Q-10 50 MG CAPSULE    Take 100 mg by mouth once daily.    COCONUT OIL ORAL    Take by mouth.    DHA-PHOSPHATIDYLSERINE ORAL    Take 300 capsules by mouth.    ESTRADIOL 0.05 MG/24 HR TD PTSW (VIVELLE-DOT) 0.05 MG/24 HR    Place one patch twice weekly    GARLIC ORAL    Take by mouth.    GLYCINE ORAL    Take 1,000 mg by mouth Daily.    IVABRADINE (CORLANOR) 7.5 MG TAB    Take 2 tablets (15 mg total) by mouth On call Procedure (take 2-hours prior to your cardiac CTA).    MAGNESIUM HYDROXIDE 1,200 MG CHEW    Take by mouth.    MAGNESIUM HYDROXIDE 400 MG/5 ML (MILK OF MAGNESIA) 400 MG/5 ML SUSP    Take 30 mLs by mouth once daily.    METOPROLOL TARTRATE (LOPRESSOR) 50 MG TABLET    Take 1 tablet (50 mg total) by mouth as needed (for heart rate over 60).    MULTIVIT WITH MINERALS/LUTEIN (MULTIVITAMIN 50 PLUS ORAL)    Take 1 tablet by mouth.    MULTIVIT-MIN/FOLIC AC/COLLAGEN (WOMEN'S MULTIVITAMIN COLLAGEN ORAL)    Take by mouth.    NITROGLYCERIN (NITROSTAT) 0.4 MG SL TABLET    Place 1 tablet (0.4 mg total) under the tongue every 5 (five) minutes as needed for Chest pain. After 2nd dose recommend ER eval - call EMS    OLIVE OIL EXTERNAL OIL    Apply topically as needed.    OMEGA 3-DHA-EPA-FISH -150-750 MG CAP    Take 1 capsule by mouth.    PASSION FLOWER ORAL    Take by mouth.    PHOSPHATIDYL SERINE, BULK, MISC    300 mg by Misc.(Non-Drug; Combo Route) route 2 (two) times a day.    PRAVASTATIN (PRAVACHOL) 20 MG TABLET    Take 1 tablet (20 mg total) by mouth every evening.    PROGESTERONE (PROMETRIUM) 100 MG CAPSULE    Take 200 mg by mouth nightly.    PROPRANOLOL (INDERAL  "LA) 60 MG 24 HR CAPSULE    Take 1 capsule (60 mg total) by mouth every evening.    SOYBEAN, FERMENTED (NATTOKINASE ORAL)    Take 200 mg by mouth 2 (two) times a day.    TAURINE 1,000 MG CAP    Take 1,000 mg by mouth 2 (two) times a day.    TURMERIC 400 MG CAP    Take by mouth.    UNABLE TO FIND    Take 1,000 mg by mouth Daily. Lion's pooja    UNABLE TO FIND    Take 1,250 mg by mouth Daily. Black Seed Oil    UNABLE TO FIND    Sulforaphane    UNABLE TO FIND    Advanced Memory Formula    UNABLE TO FIND    Take 200 mg by mouth 2 (two) times a day. L-theanine    VIT B COMPLEX 100 NO.2/HERBS (VITAMIN B COMPLEX 100  2-HERBS ORAL)    Take 1 tablet by mouth.    VITAMIN D (VITAMIN D3) 1000 UNITS TAB    Take by mouth.    VITAMIN K2 ORAL    Take by mouth.   Modified Medications    No medications on file   Discontinued Medications    No medications on file        Discussed case verbally with:{Specialist List (Optional):75406}  ***    Referrals:  No orders of the defined types were placed in this encounter.    Portions of this note may have been created with voice recognition software. Occasional "wrong-word" or "sound-a-like" substitutions may have occurred due to the inherent limitations of voice recognition software. Please, read the note carefully and recognize, using context, where substitutions have occurred.          Clinical Impression       ICD-10-CM ICD-9-CM   1. Chest pain  R07.9 786.50   2. Acute focal neurological deficit  R29.818 781.99         ED Disposition     Disposition: {Plan; disposition:14136}  Patient condition: {Condition:19972::"Good"}    ED Follow-up        Scribe Attestation:   Scribe #1: ILeni, performed the above scribed service and the documentation accurately describes the services I performed. I attest to the accuracy of the note.     Attending:   Physician Attestation Statement for Scribe #1: Sayra CHILDS DO, FACEP, personally performed the services described in this " documentation, as scribed by Leni Robb, in my presence, and it is both accurate and complete.             Scribe Attestation:   Scribe #2: I performed the above scribed service and the documentation accurately describes the services I performed. I attest to the accuracy of the note.    Attending Attestation:           Physician Attestation for Scribe:    Physician Attestation Statement for Scribe #2: I, Mir Norwood MD, reviewed documentation, as scribed by Walter Juarez in my presence, and it is both accurate and complete. I also acknowledge and confirm the content of the note done by Scribe #1.

## 2025-06-06 NOTE — SUBJECTIVE & OBJECTIVE
HPI:  79 y.o. female with a history of IBS, who  initially presented to the er with elevated BP,  nausea and was reportedly feeling sick to her stomach.    While in the ER, she was noted to have acute onset memory loss.  Per the , she had a blank look on her face.   Stroke code was called for the same..    Similar episode in 2019.  MRI showed no acute infarct.  Diagnosed with transient global amnesia.  Per the , she was under a lot of stress at the time      /79       Images personally reviewed and interpreted:  Study: Head CT and CTA Head & Neck  Study Interpretation:   nonspecific hypodensity in the left anterior lentiform nucleus/CR along with a suspected perivascular space in the left basal ganglia - Both present on prior CT from September 2023     Assessment and plan:  80 y/o  female with acute onset memory loss.    Exam ->  Alert and Ox3.  Follows commands.   No dysarthria or facial weakness.  No drift in the upper or lower extremities.   Finger-nose intact.      Memory loss is not a typical symptom of an acute stroke.   Presentation possibly related to TGA/stress   Reasonable to obtain MRI of the brain to rule out any acute ischemia.   If acute ischemia noted, Neurology consult for further recs      Lytics recommendation: Thrombolytic therapy not recommended due to Suspected stroke mimic , Patient back to neurological baseline, and Mild Non-Disabling Symptoms    Thrombectomy recommendation: No; No large vessel occlusion identified on imaging   Placement recommendation: pending further studies

## 2025-06-09 ENCOUNTER — TELEPHONE (OUTPATIENT)
Dept: OBSTETRICS AND GYNECOLOGY | Facility: CLINIC | Age: 80
End: 2025-06-09
Payer: MEDICARE

## 2025-06-09 DIAGNOSIS — Z79.890 POST-MENOPAUSE ON HRT (HORMONE REPLACEMENT THERAPY): Primary | ICD-10-CM

## 2025-06-09 NOTE — TELEPHONE ENCOUNTER
Copied from CRM #[id number]. Topic: Call Transfer - Departmental Transfer  >> Jun 9, 2025 10:50 AM Jessie wrote:  Type:  Patient Returning Call    Who Called:Jannet Hayes    Who Left Message for Patient: nurse  Does the patient know what this is regarding?:-  Would the patient rather a call back or a response via [facility]? -  Best Call Back Number:[phone]    Additional Information: RTC she missed

## 2025-06-09 NOTE — TELEPHONE ENCOUNTER
Sammi Schmitz, NP Nurse Practitioner Signed2:32 PM          Pt requesting prometrium refill - they make a 200 mg pill - would she like the one pill vs having to take 2 100 mg tablets        Patient would like 200 mg. pills

## 2025-06-10 ENCOUNTER — LAB VISIT (OUTPATIENT)
Dept: LAB | Facility: HOSPITAL | Age: 80
End: 2025-06-10
Attending: FAMILY MEDICINE
Payer: MEDICARE

## 2025-06-10 ENCOUNTER — OFFICE VISIT (OUTPATIENT)
Dept: INTERNAL MEDICINE | Facility: CLINIC | Age: 80
End: 2025-06-10
Payer: MEDICARE

## 2025-06-10 VITALS
SYSTOLIC BLOOD PRESSURE: 112 MMHG | WEIGHT: 140.44 LBS | HEIGHT: 66 IN | OXYGEN SATURATION: 97 % | DIASTOLIC BLOOD PRESSURE: 78 MMHG | HEART RATE: 93 BPM | TEMPERATURE: 98 F | BODY MASS INDEX: 22.57 KG/M2

## 2025-06-10 DIAGNOSIS — D72.829 LEUKOCYTOSIS, UNSPECIFIED TYPE: ICD-10-CM

## 2025-06-10 DIAGNOSIS — R47.9 DIFFICULTY WITH SPEECH: ICD-10-CM

## 2025-06-10 DIAGNOSIS — E87.3 METABOLIC ALKALOSIS: ICD-10-CM

## 2025-06-10 DIAGNOSIS — E87.3 METABOLIC ALKALOSIS: Primary | ICD-10-CM

## 2025-06-10 DIAGNOSIS — K59.00 CONSTIPATION, UNSPECIFIED CONSTIPATION TYPE: ICD-10-CM

## 2025-06-10 DIAGNOSIS — R74.01 ELEVATED AST (SGOT): ICD-10-CM

## 2025-06-10 DIAGNOSIS — K76.9 LIVER LESION: ICD-10-CM

## 2025-06-10 DIAGNOSIS — R42 DIZZINESS: ICD-10-CM

## 2025-06-10 LAB
ABSOLUTE EOSINOPHIL (OHS): 0.2 K/UL
ABSOLUTE MONOCYTE (OHS): 0.89 K/UL (ref 0.3–1)
ABSOLUTE NEUTROPHIL COUNT (OHS): 4.75 K/UL (ref 1.8–7.7)
ALBUMIN SERPL BCP-MCNC: 3.9 G/DL (ref 3.5–5.2)
ALP SERPL-CCNC: 44 UNIT/L (ref 40–150)
ALT SERPL W/O P-5'-P-CCNC: 14 UNIT/L (ref 10–44)
ANION GAP (OHS): 9 MMOL/L (ref 8–16)
AST SERPL-CCNC: 18 UNIT/L (ref 11–45)
BASOPHILS # BLD AUTO: 0.08 K/UL
BASOPHILS NFR BLD AUTO: 1 %
BILIRUB SERPL-MCNC: 0.3 MG/DL (ref 0.1–1)
BILIRUB SERPL-MCNC: NEGATIVE MG/DL
BLOOD URINE, POC: NORMAL
BUN SERPL-MCNC: 16 MG/DL (ref 8–23)
CALCIUM SERPL-MCNC: 9.1 MG/DL (ref 8.7–10.5)
CHLORIDE SERPL-SCNC: 103 MMOL/L (ref 95–110)
CLARITY, POC UA: NORMAL
CO2 SERPL-SCNC: 27 MMOL/L (ref 23–29)
COLOR, POC UA: NORMAL
CREAT SERPL-MCNC: 0.8 MG/DL (ref 0.5–1.4)
ERYTHROCYTE [DISTWIDTH] IN BLOOD BY AUTOMATED COUNT: 14.4 % (ref 11.5–14.5)
GFR SERPLBLD CREATININE-BSD FMLA CKD-EPI: >60 ML/MIN/1.73/M2
GLUCOSE SERPL-MCNC: 96 MG/DL (ref 70–110)
GLUCOSE UR QL STRIP: NEGATIVE
HCT VFR BLD AUTO: 43.6 % (ref 37–48.5)
HGB BLD-MCNC: 13.9 GM/DL (ref 12–16)
IMM GRANULOCYTES # BLD AUTO: 0.04 K/UL (ref 0–0.04)
IMM GRANULOCYTES NFR BLD AUTO: 0.5 % (ref 0–0.5)
KETONES UR QL STRIP: NEGATIVE
LEUKOCYTE ESTERASE URINE, POC: NEGATIVE
LYMPHOCYTES # BLD AUTO: 2.32 K/UL (ref 1–4.8)
MCH RBC QN AUTO: 30 PG (ref 27–31)
MCHC RBC AUTO-ENTMCNC: 31.9 G/DL (ref 32–36)
MCV RBC AUTO: 94 FL (ref 82–98)
NITRITE, POC UA: NEGATIVE
NUCLEATED RBC (/100WBC) (OHS): 0 /100 WBC
PH, POC UA: 6.5
PLATELET # BLD AUTO: 340 K/UL (ref 150–450)
PMV BLD AUTO: 10.4 FL (ref 9.2–12.9)
POTASSIUM SERPL-SCNC: 4.1 MMOL/L (ref 3.5–5.1)
PROT SERPL-MCNC: 7.2 GM/DL (ref 6–8.4)
PROTEIN, POC: NORMAL
RBC # BLD AUTO: 4.63 M/UL (ref 4–5.4)
RELATIVE EOSINOPHIL (OHS): 2.4 %
RELATIVE LYMPHOCYTE (OHS): 28 % (ref 18–48)
RELATIVE MONOCYTE (OHS): 10.7 % (ref 4–15)
RELATIVE NEUTROPHIL (OHS): 57.4 % (ref 38–73)
SODIUM SERPL-SCNC: 139 MMOL/L (ref 136–145)
SPECIFIC GRAVITY, POC UA: 1.01
UROBILINOGEN, POC UA: 0.2
WBC # BLD AUTO: 8.28 K/UL (ref 3.9–12.7)

## 2025-06-10 PROCEDURE — 1101F PT FALLS ASSESS-DOCD LE1/YR: CPT | Mod: CPTII,S$GLB,, | Performed by: FAMILY MEDICINE

## 2025-06-10 PROCEDURE — 99214 OFFICE O/P EST MOD 30 MIN: CPT | Mod: S$GLB,,, | Performed by: FAMILY MEDICINE

## 2025-06-10 PROCEDURE — 3288F FALL RISK ASSESSMENT DOCD: CPT | Mod: CPTII,S$GLB,, | Performed by: FAMILY MEDICINE

## 2025-06-10 PROCEDURE — 36415 COLL VENOUS BLD VENIPUNCTURE: CPT | Mod: PO

## 2025-06-10 PROCEDURE — 80053 COMPREHEN METABOLIC PANEL: CPT

## 2025-06-10 PROCEDURE — 3074F SYST BP LT 130 MM HG: CPT | Mod: CPTII,S$GLB,, | Performed by: FAMILY MEDICINE

## 2025-06-10 PROCEDURE — 85025 COMPLETE CBC W/AUTO DIFF WBC: CPT

## 2025-06-10 PROCEDURE — 3078F DIAST BP <80 MM HG: CPT | Mod: CPTII,S$GLB,, | Performed by: FAMILY MEDICINE

## 2025-06-10 PROCEDURE — 1126F AMNT PAIN NOTED NONE PRSNT: CPT | Mod: CPTII,S$GLB,, | Performed by: FAMILY MEDICINE

## 2025-06-10 PROCEDURE — G2211 COMPLEX E/M VISIT ADD ON: HCPCS | Mod: S$GLB,,, | Performed by: FAMILY MEDICINE

## 2025-06-10 PROCEDURE — 99999 PR PBB SHADOW E&M-EST. PATIENT-LVL V: CPT | Mod: PBBFAC,,, | Performed by: FAMILY MEDICINE

## 2025-06-10 PROCEDURE — 1159F MED LIST DOCD IN RCRD: CPT | Mod: CPTII,S$GLB,, | Performed by: FAMILY MEDICINE

## 2025-06-10 PROCEDURE — 81002 URINALYSIS NONAUTO W/O SCOPE: CPT | Mod: S$GLB,,, | Performed by: FAMILY MEDICINE

## 2025-06-11 ENCOUNTER — RESULTS FOLLOW-UP (OUTPATIENT)
Dept: INTERNAL MEDICINE | Facility: CLINIC | Age: 80
End: 2025-06-11

## 2025-06-11 RX ORDER — PROGESTERONE 200 MG/1
200 CAPSULE ORAL NIGHTLY
Qty: 90 CAPSULE | Refills: 3 | Status: SHIPPED | OUTPATIENT
Start: 2025-06-11 | End: 2026-06-11

## 2025-06-11 NOTE — PROGRESS NOTES
Subjective:      Patient ID: Jannet Hayes is a 79 y.o. female.    Chief Complaint: Hospital Follow Up      History of Present Illness    CHIEF COMPLAINT:  Ms. Hayes presents today for follow up of ER visit - a recent episode of dizziness and sensation that washed over her.  Had had previously scheduled appt today for evaluation of metabolic alkalosis found on testing by Pulmonology.    NEUROLOGICAL EPISODE:  She experienced an unusual sensation beginning in her legs that progressed upward over her head, followed by feeling unwell while lying down. Blood pressure was elevated to 179/unknown during the event. She had brief difficulty with speech articulation in the waiting room which resolved prior to physician evaluation. She experienced nausea without vomiting in the emergency department. Her past medical history is notable for transient global amnesia 4-5 years ago in [place] following emotional stress from an unexpected lawsuit.    CARDIOVASCULAR:  She has moderate carotid artery stenosis.    RESPIRATORY:  She reports a productive chronic cough for the past 3-4 years.    ENT:  She experiences right-sided nasal congestion at night with rhinorrhea.    GASTROINTESTINAL:  She has lifelong chronic constipation with history of alternating between constipation and diarrhea prior to starting pre/probiotic regimen. She experiences chronic bloating and abdominal pain, with consideration for possible SIBO or IBS as underlying causes.    GENITOURINARY:  She reports urinary frequency with 8-10 daily episodes without dysuria or urgency, noting regular consumption of coffee and tea.    LABS AND IMAGING:  White blood cell count and neutrophils were elevated. Thyroid labs revealed hyperthyroidism. CT of chest showed a ground glass nodule in the lung, possibly inflammatory. Imaging also identified an upper left hepatic lobe cyst.    CURRENT MEDICATIONS:  She takes milk of magnesia daily, prebiotic and probiotic supplements  "(Dr. Dewayne trevino), and uses an estradiol patch.        Past Medical History:   Diagnosis Date    Amnesia, global, transient 2019    Chronic constipation     Concussion 2020    History of IBS 2016    Skin cancer     Tremors of nervous system 2020    Vertigo 2020          Past Surgical History:   Procedure Laterality Date    ADENOIDECTOMY      APPENDECTOMY      AUGMENTATION OF BREAST  2008    silicone    COSMETIC SURGERY      EYE SURGERY      LUMBAR LAMINECTOMY WITH SURGICAL REMOVAL OF LESION OF SPINAL CORD BY POSTERIOR APPROACH  1999     Family History   Problem Relation Name Age of Onset    Diabetes Mother Roz     Stroke Mother Roz     Lymphoma Mother Roz     Arthritis Mother Roz     Cancer Mother Roz     Vision loss Mother Roz     Heart disease Father Darron      Social History[1]  Review of patient's allergies indicates:   Allergen Reactions    Oxycodone-acetaminophen Anaphylaxis     HALLUCINATIONS    Penicillins Hives     ITCHING, SWELLING.  Patient states "can take Keflex without any problems".  Other reaction(s): hives, swelling     Codeine Other (See Comments)     hallucinations    Erythromycin Other (See Comments)     "odd thinking patterns, black dots in front of eyes"...."mycins"       Review of Systems   Constitutional:  Positive for malaise/fatigue.   Respiratory:  Positive for cough and shortness of breath.    Gastrointestinal:  Positive for constipation. Negative for abdominal pain, diarrhea, nausea and vomiting.     Objective:       /78 (BP Location: Left arm, Patient Position: Sitting)   Pulse 93   Temp 98.1 °F (36.7 °C) (Tympanic)   Ht 5' 6" (1.676 m)   Wt 63.7 kg (140 lb 6.9 oz)   SpO2 97%   BMI 22.67 kg/m²   Physical Exam             Physical Exam  Constitutional:       General: She is not in acute distress.     Appearance: Normal appearance. She is well-developed. She is not ill-appearing or diaphoretic.   Cardiovascular:      Rate and Rhythm: Normal rate and regular " rhythm.      Heart sounds: Normal heart sounds.   Pulmonary:      Effort: Pulmonary effort is normal.      Breath sounds: Normal breath sounds.   Abdominal:      General: There is no distension.      Palpations: Abdomen is soft.   Neurological:      Mental Status: She is alert and oriented to person, place, and time.   Psychiatric:         Mood and Affect: Mood normal.         Behavior: Behavior normal.         Thought Content: Thought content normal.         Judgment: Judgment normal.         Assessment:     1. Metabolic alkalosis    2. Dizziness    3. Difficulty with speech    4. Leukocytosis, unspecified type    5. Constipation, unspecified constipation type    6. Elevated AST (SGOT)    7. Liver lesion      Plan:   Assessment & Plan    MEDICAL DECISION MAKING:  - Evaluated recent episode of dizziness and BP spike.  - Noted elevated WBC count and neutrophils on recent labs, indicating possible infection.  - Considered ground glass nodule in lung found on previous imaging as potential source of infection.  - Assessed chronic cough and nasal congestion.  - Evaluated hepatic cyst found on previous imaging.  - Determined daily Milk of Magnesia use for chronic constipation is likely causing metabolic alkalosis, patient wanting to continue current regimen.    PATIENT EDUCATION:  - Explained the difference between infection and inflammation.  - Discussed that bioidentical hormones have similar risks to synthetic hormones.  - Educated on the potential risks of chronic alkalosis from daily Milk of Magnesia use.    MEDICATIONS:  - Milk of Magnesia: Instructed to reduce current daily intake.    ORDERS:  - Ordered urinalysis.  - Ordered labs to recheck WBC count and liver enzymes.  - Undergo scheduled CT Lungs.  - Complete labs in August.    REFERRALS:  - Referred to Gastroenterology for evaluation and management of chronic constipation.  - Referred to Hepatology for evaluation of hepatic cyst.    FOLLOW UP:  - Follow up for  routine appointment with Dr. Harkins on July 1st.  - Follow up in September with Ms. Noonantanika.        Metabolic alkalosis  -     CBC Auto Differential; Future; Expected date: 06/10/2025  -     Comprehensive Metabolic Panel; Future; Expected date: 12/07/2025    Dizziness    Difficulty with speech    Leukocytosis, unspecified type  -     POCT urine dipstick without microscope  -     CBC Auto Differential; Future; Expected date: 06/10/2025  -     Comprehensive Metabolic Panel; Future; Expected date: 12/07/2025    Constipation, unspecified constipation type  -     Ambulatory referral/consult to Gastroenterology; Future; Expected date: 06/17/2025    Elevated AST (SGOT)  -     Ambulatory referral/consult to Hepatology; Future; Expected date: 06/17/2025    Liver lesion  -     Ambulatory referral/consult to Hepatology; Future; Expected date: 06/17/2025      Medication List with Changes/Refills   Current Medications    ACETAMINOPHEN (TYLENOL) 500 MG TABLET    Take 500 mg by mouth as needed.    ACETYLCYSTEINE (NAC ORAL)    Take 1,000 mg by mouth Daily.    ANTIARTHRITIC COMBINATION NO.2 900 MG TAB    Take 2 tablets by mouth once.    ASHWAGANDHA ROOT EXTRACT 300 MG CAP    Take by mouth.    ASPIRIN (ECOTRIN) 81 MG EC TABLET    Take 1 tablet (81 mg total) by mouth once daily.    ASTAXANTHIN 12 MG CAP    Take 12 mg by mouth Daily.    BIMATOPROST (LATISSE) 0.03 % OPHTHALMIC SOLUTION    Place one drop on applicator and apply evenly along the skin of the upper eyelid at base of eyelashes once daily at bedtime; repeat procedure for second eye (use a clean applicator).    BLUEBERRY FLAVOR MISC    by Misc.(Non-Drug; Combo Route) route.    CHOLECALCIFEROL, VITAMIN D3, (VITAMIN D3) 25 MCG (1,000 UNIT) CAPSULE    Take by mouth.    CO-ENZYME Q-10 50 MG CAPSULE    Take 100 mg by mouth once daily.    COCONUT OIL ORAL    Take by mouth.    DHA-PHOSPHATIDYLSERINE ORAL    Take 300 capsules by mouth.    ESTRADIOL 0.05 MG/24 HR TD PTSW (VIVELLE-DOT)  0.05 MG/24 HR    Place one patch twice weekly    GARLIC ORAL    Take by mouth.    GLYCINE ORAL    Take 1,000 mg by mouth Daily.    IVABRADINE (CORLANOR) 7.5 MG TAB    Take 2 tablets (15 mg total) by mouth On call Procedure (take 2-hours prior to your cardiac CTA).    MAGNESIUM HYDROXIDE 1,200 MG CHEW    Take by mouth.    MAGNESIUM HYDROXIDE 400 MG/5 ML (MILK OF MAGNESIA) 400 MG/5 ML SUSP    Take 30 mLs by mouth once daily.    MECLIZINE (ANTIVERT) 25 MG TABLET    Take 1 tablet (25 mg total) by mouth 3 (three) times daily as needed for Dizziness or Nausea.    METOPROLOL TARTRATE (LOPRESSOR) 50 MG TABLET    Take 1 tablet (50 mg total) by mouth as needed (for heart rate over 60).    MULTIVIT WITH MINERALS/LUTEIN (MULTIVITAMIN 50 PLUS ORAL)    Take 1 tablet by mouth.    MULTIVIT-MIN/FOLIC AC/COLLAGEN (WOMEN'S MULTIVITAMIN COLLAGEN ORAL)    Take by mouth.    NITROGLYCERIN (NITROSTAT) 0.4 MG SL TABLET    Place 1 tablet (0.4 mg total) under the tongue every 5 (five) minutes as needed for Chest pain. After 2nd dose recommend ER eval - call EMS    OLIVE OIL EXTERNAL OIL    Apply topically as needed.    OMEGA 3-DHA-EPA-FISH -150-750 MG CAP    Take 1 capsule by mouth.    PASSION FLOWER ORAL    Take by mouth.    PHOSPHATIDYL SERINE, BULK, MISC    300 mg by Misc.(Non-Drug; Combo Route) route 2 (two) times a day.    PRAVASTATIN (PRAVACHOL) 20 MG TABLET    Take 1 tablet (20 mg total) by mouth every evening.    PROGESTERONE (PROMETRIUM) 100 MG CAPSULE    Take 200 mg by mouth nightly.    PROPRANOLOL (INDERAL LA) 60 MG 24 HR CAPSULE    Take 1 capsule (60 mg total) by mouth every evening.    SOYBEAN, FERMENTED (NATTOKINASE ORAL)    Take 200 mg by mouth 2 (two) times a day.    TAURINE 1,000 MG CAP    Take 1,000 mg by mouth 2 (two) times a day.    TURMERIC 400 MG CAP    Take by mouth.    UNABLE TO FIND    Take 1,000 mg by mouth Daily. Lion's pooja    UNABLE TO FIND    Take 1,250 mg by mouth Daily. Black Seed Oil    UNABLE TO FIND     Sulforaphane    UNABLE TO FIND    Advanced Memory Formula    UNABLE TO FIND    Take 200 mg by mouth 2 (two) times a day. L-theanine    VIT B COMPLEX 100 NO.2/HERBS (VITAMIN B COMPLEX 100  2-HERBS ORAL)    Take 1 tablet by mouth.    VITAMIN D (VITAMIN D3) 1000 UNITS TAB    Take by mouth.    VITAMIN K2 ORAL    Take by mouth.       This note was generated with the assistance of ambient listening technology. Verbal consent was obtained by the patient and accompanying visitor(s) for the recording of patient appointment to facilitate this note. I attest to having reviewed and edited the generated note for accuracy, though some syntax or spelling errors may persist. Please contact the author of this note for any clarification.            [1]   Social History  Socioeconomic History    Marital status:    Tobacco Use    Smoking status: Never    Smokeless tobacco: Never   Substance and Sexual Activity    Alcohol use: Yes     Alcohol/week: 1.0 standard drink of alcohol     Types: 1 Glasses of wine per week     Comment: occas    Drug use: Never    Sexual activity: Not Currently     Partners: Male     Birth control/protection: Post-menopausal

## 2025-06-20 ENCOUNTER — TELEPHONE (OUTPATIENT)
Dept: HEPATOLOGY | Facility: CLINIC | Age: 80
End: 2025-06-20
Payer: MEDICARE

## 2025-06-20 NOTE — TELEPHONE ENCOUNTER
Yessenia Uribe [facility] Hepatology Scheduling  Type:  Sooner Apoointment Request    Caller is requesting a sooner appointment.  Caller declined first available appointment listed below.  Caller will not accept being placed on the waitlist and is requesting a message be sent to doctor.  Name of Caller:patient  When is the first available appointment?no appt  Symptoms:  Elevated AST (SGOT) [R74.01]  Liver lesion [K76.9]  Would the patient rather a call back or a response via [facility]? Call back  Best Call Back Number:[phone]  Additional Information: patient is ready to schedule appt/Thanks

## 2025-06-20 NOTE — TELEPHONE ENCOUNTER
Called the patient to schedule a hepatology consult from referral.  No answer, left message with call back # [phone].

## 2025-07-01 ENCOUNTER — OFFICE VISIT (OUTPATIENT)
Dept: CARDIOLOGY | Facility: CLINIC | Age: 80
End: 2025-07-01
Payer: MEDICARE

## 2025-07-01 VITALS
OXYGEN SATURATION: 96 % | WEIGHT: 140.56 LBS | SYSTOLIC BLOOD PRESSURE: 110 MMHG | HEART RATE: 75 BPM | BODY MASS INDEX: 22.68 KG/M2 | DIASTOLIC BLOOD PRESSURE: 70 MMHG

## 2025-07-01 DIAGNOSIS — K21.9 GASTROESOPHAGEAL REFLUX DISEASE, UNSPECIFIED WHETHER ESOPHAGITIS PRESENT: ICD-10-CM

## 2025-07-01 DIAGNOSIS — R06.02 SOB (SHORTNESS OF BREATH): ICD-10-CM

## 2025-07-01 DIAGNOSIS — K58.9 IRRITABLE BOWEL SYNDROME, UNSPECIFIED TYPE: ICD-10-CM

## 2025-07-01 DIAGNOSIS — R09.89 CAROTID BRUIT, UNSPECIFIED LATERALITY: ICD-10-CM

## 2025-07-01 DIAGNOSIS — R00.0 TACHYCARDIA: ICD-10-CM

## 2025-07-01 DIAGNOSIS — R41.3 MEMORY LOSS: ICD-10-CM

## 2025-07-01 DIAGNOSIS — I25.118 CORONARY ARTERY DISEASE OF NATIVE ARTERY OF NATIVE HEART WITH STABLE ANGINA PECTORIS: ICD-10-CM

## 2025-07-01 DIAGNOSIS — E78.49 OTHER HYPERLIPIDEMIA: ICD-10-CM

## 2025-07-01 DIAGNOSIS — R06.09 OTHER FORM OF DYSPNEA: ICD-10-CM

## 2025-07-01 DIAGNOSIS — I65.23 BILATERAL CAROTID ARTERY STENOSIS: ICD-10-CM

## 2025-07-01 DIAGNOSIS — R07.9 CHEST PAIN, UNSPECIFIED TYPE: Primary | ICD-10-CM

## 2025-07-01 DIAGNOSIS — F41.9 ANXIETY: ICD-10-CM

## 2025-07-01 DIAGNOSIS — R42 VERTIGO: ICD-10-CM

## 2025-07-01 PROCEDURE — G2211 COMPLEX E/M VISIT ADD ON: HCPCS | Mod: S$GLB,,, | Performed by: INTERNAL MEDICINE

## 2025-07-01 PROCEDURE — 1160F RVW MEDS BY RX/DR IN RCRD: CPT | Mod: CPTII,S$GLB,, | Performed by: INTERNAL MEDICINE

## 2025-07-01 PROCEDURE — 99214 OFFICE O/P EST MOD 30 MIN: CPT | Mod: S$GLB,,, | Performed by: INTERNAL MEDICINE

## 2025-07-01 PROCEDURE — 3078F DIAST BP <80 MM HG: CPT | Mod: CPTII,S$GLB,, | Performed by: INTERNAL MEDICINE

## 2025-07-01 PROCEDURE — 3288F FALL RISK ASSESSMENT DOCD: CPT | Mod: CPTII,S$GLB,, | Performed by: INTERNAL MEDICINE

## 2025-07-01 PROCEDURE — 1159F MED LIST DOCD IN RCRD: CPT | Mod: CPTII,S$GLB,, | Performed by: INTERNAL MEDICINE

## 2025-07-01 PROCEDURE — 1101F PT FALLS ASSESS-DOCD LE1/YR: CPT | Mod: CPTII,S$GLB,, | Performed by: INTERNAL MEDICINE

## 2025-07-01 PROCEDURE — 3074F SYST BP LT 130 MM HG: CPT | Mod: CPTII,S$GLB,, | Performed by: INTERNAL MEDICINE

## 2025-07-01 PROCEDURE — 99999 PR PBB SHADOW E&M-EST. PATIENT-LVL V: CPT | Mod: PBBFAC,,, | Performed by: INTERNAL MEDICINE

## 2025-07-01 RX ORDER — PROPRANOLOL HYDROCHLORIDE 10 MG/1
10 TABLET ORAL 3 TIMES DAILY PRN
Qty: 90 TABLET | Refills: 3 | Status: SHIPPED | OUTPATIENT
Start: 2025-07-01 | End: 2026-07-01

## 2025-07-01 NOTE — PROGRESS NOTES
Subjective:   Patient ID:  Jannet Hayes is a 79 y.o. female who presents for cardiac consult of No chief complaint on file.      Referral by: No referring provider defined for this encounter.     Reason for consult:       HPI  The patient came in today for cardiac consult of No chief complaint on file.      Jannet Hayes is a 79 y.o. female  non obs CAD, h/o vertigo, tremors, irritable bowel syndrome here for CV follow up.       1/16/25  Pt had recent ER eval for elevated HR, dizziness,presyncope.   Pt seen by Dr. Mcdermott last year here for follow up.     ECHO 6/2022 with normal bi V function, mild TR.   ECG stress test neg for ischemia 6/2022  Vital monitor in 2022 was neg overall.   BP and HR stable today. BMI 25  Her pulse ox at home revealed  bpm. She had mild SOB with talking.       3/20/25  ECHO 2/2025 with normal bi V function and valves, PASP 28 mmHg.   ECG stress test 2/2025 neg for ischemia but less than 3 min exercise times with HTN response noted, artifact/SVT noted.   Vital monitor 2/2025 with AVG HR 78 bpm, rare rhythms noted; pt events correlate with sinus tachy V rate max 137 bpm.   Carotids  2/2025 with mod b/l stenosis 40-49%.   She had elevated LFTs, normal liver u/s.  Pt thought Propronolol would cause insomnia, discussed     7/1/25  Nuc stress neg for ischemia 4/2025.   CTA coronaries with non obs CAD of prox RCA.   She had ER eval last month for dizziness, CP - neg CV workup.     BP low normal - 110/70. HR 70s. BMI 22 - 140 lbs     FH - mother - stroke, father - MI      -CTA coronaries - 4/2024  1. Nonobstructive disease within the proximal RCA. This was only visualized on the noncontrast calcium score.     2. Coronary calcium score 98 (53rd percen    Results for orders placed during the hospital encounter of 04/30/25    Nuclear Stress - Cardiology Interpreted    Interpretation Summary    Normal myocardial perfusion scan. There is no evidence of myocardial ischemia or  infarction.    The gated perfusion images showed an ejection fraction of 82% at rest. The gated perfusion images showed an ejection fraction of 81% post stress. Normal ejection fraction is greater than 59%.    The ECG portion of the study is negative for ischemia.    There were no arrhythmias during stress.      Results for orders placed during the hospital encounter of 02/06/25    Echo    Interpretation Summary    Left Ventricle: The left ventricle is normal in size. Ventricular mass is normal. Normal wall thickness. There is concentric remodeling. There is normal systolic function with a visually estimated ejection fraction of 55 - 60%. There is normal diastolic function.    Right Ventricle: Normal right ventricular cavity size. Wall thickness is normal. Systolic function is normal.    Pulmonary Artery: The estimated pulmonary artery systolic pressure is 28 mmHg.    IVC/SVC: Normal venous pressure at 3 mmHg.      Interpretation Summary  Show Result Comparison     There is 40-49% right Internal Carotid Stenosis.    There is 40-49% left Internal Carotid Stenosis.    Results for orders placed during the hospital encounter of 02/06/25    Exercise Stress - EKG    Interpretation Summary    The ECG portion of the study is negative for ischemia.    The patient reported no chest pain during the stress test.    The exercise capacity was moderately impaired.    The patient exercised for 2 minutes 49 seconds on a Fazal protocol, achieving a peak heart rate of 142 bpm, which is 103% of the age predicted maximum heart rate. Their exercise capacity was moderately impaired.    Lots of artifact svt    Stress ECG: There is hypertensive blood pressure response with stress.      Sinus tachycardia   Cannot rule out Anterior infarct ,age undetermined   Abnormal ECG   When compared with ECG of 20-Mar-2024 10:20,   No significant change was found   Confirmed by Jimena Hong ([id number]) on 1/13/2025 3:35:36 PM         Cardiac Monitor -  3-15 Day Adult (Cupid Only)  Result Date: 2/20/2025    The predominant rhythm is sinus.    The patient was monitored for a total of 6d 5h, underlying rhythm is   Sinus.  The minimum heart rate was 55 bpm; the maximum 137 bpm; the average 78   bpm.  0 % of Atrial fibrillation/Atrial flutter with longest episode of 0 ms.  The total burden of AV Block present was 0 % [Complete Heart Block: 0 %;   Advanced (High Grade):  0 %; 2nd Degree, Mobitz II: 0 %; 2nd Degree, Mobitz I: 0 %].  There were 0 pauses, the longest pause was 0 ms at --.  Total count of Ventricular Tachycardia (VT): 1 episode(s). Longest VT: 3   beats on Day 5 / 04:27:42  pm. Fastest Ventricular Run: 101 bpm on Day 5 / 04:27:42 pm. Total Count   of Ventricular Episodes  <100bpm: 0 episode(s). Longest Ventricular Event <100bpm: 0 s on --  8 supraventricular episodes were found. Longest SVT Episode 13 beats,   Fastest  bpm  There were a total of 9 PVCs with 1 morphologies and 0 couplets. Overall   PVC [place] at < 0.01 %  There were a total of 0 Other Beats. There were 0 total number of paced   beats.  There were a total of 218 PSVCs with 7 couplets. Overall PSVC [place] at  0.03 %  There is a total of 18 patient events.  These correlated with NSR to sinus tachycardia from  bpm               Past Medical History:   Diagnosis Date    Amnesia, global, transient 2019    Chronic constipation     Concussion 2020    History of IBS 2016    Skin cancer     Tremors of nervous system 2020    Vertigo 2020       Past Surgical History:   Procedure Laterality Date    ADENOIDECTOMY      APPENDECTOMY      AUGMENTATION OF BREAST  2008    silicone    COSMETIC SURGERY      EYE SURGERY      LUMBAR LAMINECTOMY WITH SURGICAL REMOVAL OF LESION OF SPINAL CORD BY POSTERIOR APPROACH  1999       Social History     Tobacco Use    Smoking status: Never    Smokeless tobacco: Never   Substance Use Topics    Alcohol use: Yes     Alcohol/week: 1.0 standard drink of alcohol      Types: 1 Glasses of wine per week     Comment: occas    Drug use: Never       Family History   Problem Relation Name Age of Onset    Diabetes Mother Roz     Stroke Mother Roz     Lymphoma Mother Roz     Arthritis Mother Roz     Cancer Mother Roz     Vision loss Mother Roz     Heart disease Father Darron        Patient's Medications   New Prescriptions    PROPRANOLOL (INDERAL) 10 MG TABLET    Take 1 tablet (10 mg total) by mouth 3 (three) times daily as needed (for palpitations with elevated pulse over 90).   Previous Medications    ACETAMINOPHEN (TYLENOL) 500 MG TABLET    Take 500 mg by mouth as needed.    ACETYLCYSTEINE (NAC ORAL)    Take 1,000 mg by mouth Daily.    ANTIARTHRITIC COMBINATION NO.2 900 MG TAB    Take 2 tablets by mouth once.    ASHWAGANDHA ROOT EXTRACT 300 MG CAP    Take by mouth.    ASPIRIN (ECOTRIN) 81 MG EC TABLET    Take 1 tablet (81 mg total) by mouth once daily.    ASTAXANTHIN 12 MG CAP    Take 12 mg by mouth Daily.    BIMATOPROST (LATISSE) 0.03 % OPHTHALMIC SOLUTION    Place one drop on applicator and apply evenly along the skin of the upper eyelid at base of eyelashes once daily at bedtime; repeat procedure for second eye (use a clean applicator).    BLUEBERRY FLAVOR MISC    by Misc.(Non-Drug; Combo Route) route.    CHOLECALCIFEROL, VITAMIN D3, (VITAMIN D3) 25 MCG (1,000 UNIT) CAPSULE    Take by mouth.    CO-ENZYME Q-10 50 MG CAPSULE    Take 100 mg by mouth once daily.    COCONUT OIL ORAL    Take by mouth.    DHA-PHOSPHATIDYLSERINE ORAL    Take 300 capsules by mouth.    ESTRADIOL 0.05 MG/24 HR TD PTSW (VIVELLE-DOT) 0.05 MG/24 HR    Place one patch twice weekly    GARLIC ORAL    Take by mouth.    GLYCINE ORAL    Take 1,000 mg by mouth Daily.    IVABRADINE (CORLANOR) 7.5 MG TAB    Take 2 tablets (15 mg total) by mouth On call Procedure (take 2-hours prior to your cardiac CTA).    MAGNESIUM HYDROXIDE 1,200 MG CHEW    Take by mouth.    MAGNESIUM HYDROXIDE 400 MG/5 ML (MILK OF  MAGNESIA) 400 MG/5 ML SUSP    Take 30 mLs by mouth once daily.    MECLIZINE (ANTIVERT) 25 MG TABLET    Take 1 tablet (25 mg total) by mouth 3 (three) times daily as needed for Dizziness or Nausea.    MULTIVIT WITH MINERALS/LUTEIN (MULTIVITAMIN 50 PLUS ORAL)    Take 1 tablet by mouth.    MULTIVIT-MIN/FOLIC AC/COLLAGEN (WOMEN'S MULTIVITAMIN COLLAGEN ORAL)    Take by mouth.    NITROGLYCERIN (NITROSTAT) 0.4 MG SL TABLET    Place 1 tablet (0.4 mg total) under the tongue every 5 (five) minutes as needed for Chest pain. After 2nd dose recommend ER eval - call EMS    OLIVE OIL EXTERNAL OIL    Apply topically as needed.    OMEGA 3-DHA-EPA-FISH -150-750 MG CAP    Take 1 capsule by mouth.    PASSION FLOWER ORAL    Take by mouth.    PHOSPHATIDYL SERINE, BULK, MISC    300 mg by Misc.(Non-Drug; Combo Route) route 2 (two) times a day.    PRAVASTATIN (PRAVACHOL) 20 MG TABLET    Take 1 tablet (20 mg total) by mouth every evening.    PROGESTERONE (PROMETRIUM) 200 MG CAPSULE    Take 1 capsule (200 mg total) by mouth nightly.    SOYBEAN, FERMENTED (NATTOKINASE ORAL)    Take 200 mg by mouth 2 (two) times a day.    TAURINE 1,000 MG CAP    Take 1,000 mg by mouth 2 (two) times a day.    TURMERIC 400 MG CAP    Take by mouth.    UNABLE TO FIND    Take 1,000 mg by mouth Daily. Lion's pooja    UNABLE TO FIND    Take 1,250 mg by mouth Daily. Black Seed Oil    UNABLE TO FIND    Sulforaphane    UNABLE TO FIND    Advanced Memory Formula    UNABLE TO FIND    Take 200 mg by mouth 2 (two) times a day. L-theanine    VIT B COMPLEX 100 NO.2/HERBS (VITAMIN B COMPLEX 100  2-HERBS ORAL)    Take 1 tablet by mouth.    VITAMIN D (VITAMIN D3) 1000 UNITS TAB    Take by mouth.    VITAMIN K2 ORAL    Take by mouth.   Modified Medications    No medications on file   Discontinued Medications    METOPROLOL TARTRATE (LOPRESSOR) 50 MG TABLET    Take 1 tablet (50 mg total) by mouth as needed (for heart rate over 60).    PROPRANOLOL (INDERAL LA) 60 MG 24 HR CAPSULE     Take 1 capsule (60 mg total) by mouth every evening.       Review of Systems   Constitutional:  Positive for malaise/fatigue.   HENT: Negative.     Eyes: Negative.    Respiratory:  Positive for shortness of breath.    Cardiovascular:  Positive for palpitations.   Gastrointestinal: Negative.    Genitourinary: Negative.    Musculoskeletal: Negative.    Skin: Negative.    Neurological:  Positive for dizziness, focal weakness and weakness.   Endo/Heme/Allergies: Negative.    Psychiatric/Behavioral: Negative.     All 12 systems otherwise negative.      Wt Readings from Last 3 Encounters:   07/01/25 63.7 kg (140 lb 8.7 oz)   06/10/25 63.7 kg (140 lb 6.9 oz)   06/05/25 59.8 kg (131 lb 13.4 oz)     Temp Readings from Last 3 Encounters:   06/10/25 98.1 °F (36.7 °C) (Tympanic)   06/05/25 98.7 °F (37.1 °C) (Oral)   03/05/25 97.8 °F (36.6 °C) (Tympanic)     BP Readings from Last 3 Encounters:   07/01/25 110/70   06/10/25 112/78   06/06/25 (!) 116/59     Pulse Readings from Last 3 Encounters:   07/01/25 75   06/10/25 93   06/06/25 86       /70 (BP Location: Right arm, Patient Position: Sitting)   Pulse 75   Wt 63.7 kg (140 lb 8.7 oz)   SpO2 96%   BMI 22.68 kg/m²     Objective:   Physical Exam  Vitals and nursing note reviewed.   Constitutional:       General: She is not in acute distress.     Appearance: She is well-developed. She is not diaphoretic.   HENT:      Head: Normocephalic and atraumatic.      Nose: Nose normal.   Eyes:      General: No scleral icterus.     Conjunctiva/sclera: Conjunctivae normal.   Neck:      Thyroid: No thyromegaly.      Vascular: No JVD.   Cardiovascular:      Rate and Rhythm: Normal rate and regular rhythm.      Heart sounds: S1 normal and S2 normal. No murmur heard.     No friction rub. No gallop. No S3 or S4 sounds.   Pulmonary:      Effort: Pulmonary effort is normal. No respiratory distress.      Breath sounds: Normal breath sounds. No stridor. No wheezing or rales.   Chest:       Chest wall: No tenderness.   Abdominal:      General: Bowel sounds are normal. There is no distension.      Palpations: Abdomen is soft. There is no mass.      Tenderness: There is no abdominal tenderness. There is no rebound.   Genitourinary:     Comments: Deferred  Musculoskeletal:         General: No tenderness or deformity. Normal range of motion.      Cervical back: Normal range of motion and neck supple.   Lymphadenopathy:      Cervical: No cervical adenopathy.   Skin:     General: Skin is warm and dry.      Coloration: Skin is not pale.      Findings: No erythema or rash.   Neurological:      Mental Status: She is alert and oriented to person, place, and time.      Motor: No abnormal muscle tone.      Coordination: Coordination normal.   Psychiatric:         Behavior: Behavior normal.         Thought Content: Thought content normal.         Judgment: Judgment normal.         Lab Results   Component Value Date     06/10/2025     02/25/2025    K 4.1 06/10/2025    K 4.3 02/25/2025     06/10/2025     02/25/2025    CO2 27 06/10/2025    CO2 25 02/25/2025    BUN 16 06/10/2025    CREATININE 0.8 06/10/2025    GLU 96 06/10/2025    GLU 65 (L) 02/25/2025    HGBA1C 5.1 02/25/2025    AST 18 06/10/2025    AST 54 (H) 03/07/2025    ALT 14 06/10/2025    ALT 15 03/07/2025    ALBUMIN 3.9 06/10/2025    ALBUMIN 3.8 03/07/2025    PROT 7.2 06/10/2025    PROT 7.1 03/07/2025    BILITOT 0.3 06/10/2025    BILITOT 0.2 03/07/2025    WBC 8.28 06/10/2025    HGB 13.9 06/10/2025    HGB 15.3 02/25/2025    HCT 43.6 06/10/2025    HCT 46.9 02/25/2025    MCV 94 06/10/2025    MCV 94 02/25/2025     06/10/2025     02/25/2025    INR 0.9 06/05/2025    TSH 0.228 (L) 06/05/2025    TSH 2.610 01/12/2025    CHOL 221 (H) 06/05/2025    CHOL 202 (H) 02/25/2025    HDL 73 06/05/2025    LDLCALC 130.2 06/05/2025    TRIG 89 06/05/2025    TRIG 87 02/25/2025    BNP <10 06/05/2025         BNP (pg/mL)   Date Value   06/05/2025 <10    01/12/2025 <10   03/21/2022 <10     INR (no units)   Date Value   06/05/2025 0.9          Assessment:      1. Chest pain, unspecified type    2. Tachycardia    3. Vertigo    4. SOB (shortness of breath)    5. Irritable bowel syndrome, unspecified type    6. Other form of dyspnea    7. Memory loss    8. Carotid bruit, unspecified laterality    9. Anxiety    10. Bilateral carotid artery stenosis    11. Gastroesophageal reflux disease, unspecified whether esophagitis present    12. Other hyperlipidemia    13. Coronary artery disease of native artery of native heart with stable angina pectoris          Plan:     Dizziness, presyncope, vertigo, tachycardia, with CP/SOB  - prior CV testing neg - echo, stress, vital 2022  - cont propranolol PRN 10mg  --> change to long acting Propranolol 60mg QHS --> back on 10mg   -ECHO 2/2025 with normal bi V function and valves, PASP 28 mmHg.   -ECG stress test 2/2025 neg for ischemia but less than 3 min exercise times with HTN response noted, artifact/SVT noted.   -Vital monitor 2/2025 with AVG HR 78 bpm, rare rhythms noted; pt events correlate with sinus tachy V rate max 137 bpm.   - referred to ENT  -Nuc stress neg for ischemia 4/2025.   -CTA coronaries with non obs CAD of prox RCA.     -CTA coronaries - 4/2024  1. Nonobstructive disease within the proximal RCA. This was only visualized on the noncontrast calcium score.  2. Coronary calcium score 98 (53rd percentile).   - rec compression stockings     2. IBS  - cont tx per PCP/GI    3. Vit D def, memory loss  - cont supplements    4. Anxiety  - was on meds in past    5. Carotid artery disease with HLD  -Carotids  2/2025 with mod b/l stenosis 40-49%.   - start -  asa ; has not started Pravastatin 20mg ; will repeat labs in 6 months   - cont to monitor      Visit today included increased complexity associated with the care of the episodic problem tachycardia addressed and managing the longitudinal care of the patient due to the  serious and/or complex managed problem(s) .        Thank you for allowing me to participate in this patient's care. Please do not hesitate to contact me with any questions or concerns. Consult note has been forwarded to the referral physician.

## 2025-09-02 DIAGNOSIS — H02.729 EYELASH SCANTINESS: ICD-10-CM

## 2025-09-02 RX ORDER — BIMATOPROST 3 UG/ML
SOLUTION TOPICAL
Qty: 5 ML | Refills: 11 | OUTPATIENT
Start: 2025-09-02

## 2025-09-03 RX ORDER — BIMATOPROST 3 UG/ML
SOLUTION TOPICAL
Qty: 5 ML | Refills: 11 | Status: CANCELLED | OUTPATIENT
Start: 2025-09-03

## 2025-09-04 ENCOUNTER — HOSPITAL ENCOUNTER (OUTPATIENT)
Dept: RADIOLOGY | Facility: HOSPITAL | Age: 80
Discharge: HOME | End: 2025-09-04
Attending: PHYSICIAN ASSISTANT
Payer: MEDICARE

## 2025-09-04 ENCOUNTER — OFFICE VISIT (OUTPATIENT)
Dept: PULMONOLOGY | Facility: CLINIC | Age: 80
End: 2025-09-04
Payer: MEDICARE

## 2025-09-04 VITALS
BODY MASS INDEX: 23.69 KG/M2 | DIASTOLIC BLOOD PRESSURE: 70 MMHG | HEART RATE: 85 BPM | OXYGEN SATURATION: 94 % | HEIGHT: 64 IN | WEIGHT: 138.75 LBS | SYSTOLIC BLOOD PRESSURE: 100 MMHG | RESPIRATION RATE: 13 BRPM

## 2025-09-04 DIAGNOSIS — R06.02 SOB (SHORTNESS OF BREATH): Primary | ICD-10-CM

## 2025-09-04 DIAGNOSIS — R06.02 SOB (SHORTNESS OF BREATH): ICD-10-CM

## 2025-09-04 DIAGNOSIS — R91.8 LUNG NODULES: ICD-10-CM

## 2025-09-04 DIAGNOSIS — R53.83 OTHER FATIGUE: ICD-10-CM

## 2025-09-04 PROCEDURE — 71250 CT THORAX DX C-: CPT | Mod: TC

## 2025-09-04 PROCEDURE — 71250 CT THORAX DX C-: CPT | Mod: 26,,, | Performed by: RADIOLOGY

## 2025-09-04 PROCEDURE — 99999 PR PBB SHADOW E&M-EST. PATIENT-LVL V: CPT | Mod: PBBFAC,,, | Performed by: PHYSICIAN ASSISTANT

## 2025-09-05 ENCOUNTER — TELEPHONE (OUTPATIENT)
Dept: INTERNAL MEDICINE | Facility: CLINIC | Age: 80
End: 2025-09-05
Payer: MEDICARE